# Patient Record
Sex: FEMALE | Race: WHITE | HISPANIC OR LATINO | Employment: FULL TIME | URBAN - METROPOLITAN AREA
[De-identification: names, ages, dates, MRNs, and addresses within clinical notes are randomized per-mention and may not be internally consistent; named-entity substitution may affect disease eponyms.]

---

## 2017-01-03 ENCOUNTER — TRANSFERRED RECORDS (OUTPATIENT)
Dept: HEALTH INFORMATION MANAGEMENT | Facility: CLINIC | Age: 22
End: 2017-01-03

## 2017-01-09 ENCOUNTER — TELEPHONE (OUTPATIENT)
Dept: PEDIATRICS | Facility: CLINIC | Age: 22
End: 2017-01-09

## 2017-01-09 ENCOUNTER — OFFICE VISIT (OUTPATIENT)
Dept: PEDIATRICS | Facility: CLINIC | Age: 22
End: 2017-01-09
Payer: COMMERCIAL

## 2017-01-09 VITALS
TEMPERATURE: 98.1 F | HEIGHT: 64 IN | WEIGHT: 139.4 LBS | SYSTOLIC BLOOD PRESSURE: 108 MMHG | HEART RATE: 72 BPM | BODY MASS INDEX: 23.8 KG/M2 | DIASTOLIC BLOOD PRESSURE: 68 MMHG

## 2017-01-09 DIAGNOSIS — F90.0 ADHD (ATTENTION DEFICIT HYPERACTIVITY DISORDER), INATTENTIVE TYPE: Primary | ICD-10-CM

## 2017-01-09 DIAGNOSIS — F90.0 ADHD (ATTENTION DEFICIT HYPERACTIVITY DISORDER), INATTENTIVE TYPE: ICD-10-CM

## 2017-01-09 DIAGNOSIS — F32.81 PMDD (PREMENSTRUAL DYSPHORIC DISORDER): Primary | ICD-10-CM

## 2017-01-09 PROCEDURE — 99213 OFFICE O/P EST LOW 20 MIN: CPT | Performed by: INTERNAL MEDICINE

## 2017-01-09 RX ORDER — DEXTROAMPHETAMINE SACCHARATE, AMPHETAMINE ASPARTATE MONOHYDRATE, DEXTROAMPHETAMINE SULFATE AND AMPHETAMINE SULFATE 1.25; 1.25; 1.25; 1.25 MG/1; MG/1; MG/1; MG/1
5-10 CAPSULE, EXTENDED RELEASE ORAL DAILY
Qty: 60 CAPSULE | Refills: 0 | Status: SHIPPED | OUTPATIENT
Start: 2017-01-09 | End: 2017-01-18

## 2017-01-09 RX ORDER — PANTOPRAZOLE SODIUM 40 MG/1
1 TABLET, DELAYED RELEASE ORAL DAILY
Refills: 4 | COMMUNITY
Start: 2016-11-25 | End: 2017-03-22

## 2017-01-09 RX ORDER — DROSPIRENONE AND ETHINYL ESTRADIOL 0.03MG-3MG
1 KIT ORAL DAILY
Qty: 84 TABLET | Refills: 3 | Status: SHIPPED | OUTPATIENT
Start: 2017-01-09 | End: 2017-11-22

## 2017-01-09 RX ORDER — DEXTROAMPHETAMINE SACCHARATE, AMPHETAMINE ASPARTATE MONOHYDRATE, DEXTROAMPHETAMINE SULFATE AND AMPHETAMINE SULFATE 2.5; 2.5; 2.5; 2.5 MG/1; MG/1; MG/1; MG/1
10 CAPSULE, EXTENDED RELEASE ORAL DAILY
Qty: 30 CAPSULE | Refills: 0 | Status: SHIPPED | OUTPATIENT
Start: 2017-01-09 | End: 2017-01-18

## 2017-01-09 RX ORDER — METHYLPHENIDATE HYDROCHLORIDE 18 MG/1
18 TABLET ORAL EVERY MORNING
Refills: 0 | Status: CANCELLED | OUTPATIENT
Start: 2017-01-09

## 2017-01-09 NOTE — TELEPHONE ENCOUNTER
Patient had an office appointment today and is beginning Adderall XR.  Was told to take 5 mg daily for one day, and then increase to 10 mg daily.  Patient's insurance won't cover more than one capsule daily.  Need to dose either 5 mg or 10 mg.  Please call patient with direction.  May need a new RX.  EBONIE Lu RN

## 2017-01-09 NOTE — MR AVS SNAPSHOT
After Visit Summary   1/9/2017    Mariposa Nunez    MRN: [id number]           Patient Information     Date Of Birth          1995        Visit Information        Provider Department      1/9/2017 10:50 AM Nadine Villarreal MD [facility]        Today's Diagnoses     PMDD (premenstrual dysphoric disorder)    -  1     ADHD (attention deficit hyperactivity disorder), inattentive type           Care Instructions    We should try to get a copy of the ADHD testing you had done in 11th grade.  Let's work on finding an ADHD medication that has very few side effects. trythe adderall XR 5 mg the first day, then 10 mg daily.        Follow-ups after your visit        Who to contact     If you have questions or need follow up information about today's clinic visit or your schedule please contact [facility] directly at [phone].  Normal or non-critical lab and imaging results will be communicated to you by MyChart, letter or phone within 4 business days after the clinic has received the results. If you do not hear from us within 7 days, please contact the clinic through [facility] or phone. If you have a critical or abnormal lab result, we will notify you by phone as soon as possible.  Submit refill requests through [employer] or call your pharmacy and they will forward the refill request to us. Please allow 3 business days for your refill to be completed.          Additional Information About Your Visit        MyChart Information     [facility] gives you secure access to your electronic health record. If you see a primary care provider, you can also send messages to your care team and make appointments. If you have questions, please call your primary care clinic.  If you do not have a primary care provider, please call [phone] and they will assist you.        Care EveryWhere ID     This is your Care EveryWhere ID. This could be used by other organizations to access your [place]  "medical records  [id number]        Your Vitals Were     Pulse Temperature Height BMI (Body Mass Index)          72 98.1  F (36.7  C) (Oral) 5' 4\" (1.626 m) 23.92 kg/m2         Blood Pressure from Last 3 Encounters:   01/09/17 108/68   07/26/16 110/76   01/04/16 116/78    Weight from Last 3 Encounters:   01/09/17 139 lb 6.4 oz (63.231 kg) (68.18 %*)   07/26/16 137 lb 1.6 oz (62.188 kg) (66.36 %*)   01/04/16 144 lb 3.2 oz (65.409 kg) (77.07 %*)     * Growth percentiles are based on [place] 2-20 Years data.              Today, you had the following     No orders found for display         Today's Medication Changes          These changes are accurate as of: 1/9/17 11:41 AM.  If you have any questions, ask your nurse or doctor.               Start taking these medicines.        Dose/Directions    amphetamine-dextroamphetamine 5 MG per 24 hr capsule   Commonly known as:  ADDERALL XR   Used for:  ADHD (attention deficit hyperactivity disorder), inattentive type   Started by:  Nadine Villarreal MD        Dose:  5-10 mg   Take 1-2 capsules (5-10 mg) by mouth daily   Quantity:  60 capsule   Refills:  0            Where to get your medicines      These medications were sent to [facility] [id number] [address] AT SEC of [facility] [address]  [address] [facility] [address], [facility] [address]     Phone:  [phone]    - drospirenone-ethinyl estradiol 3-0.03 MG per tablet      Some of these will need a paper prescription and others can be bought over the counter.  Ask your nurse if you have questions.     Bring a paper prescription for each of these medications    - amphetamine-dextroamphetamine 5 MG per 24 hr capsule             Primary Care Provider Office Phone # Fax #    Danielle Al Goodwin [phone]       [facility] [address] [facility] [address]        Thank you!     Thank you for choosing [facility]  for your care. Our goal is always to provide you " with excellent care. Hearing back from our patients is one way we can continue to improve our services. Please take a few minutes to complete the written survey that you may receive in the mail after your visit with us. Thank you!             Your Updated Medication List - Protect others around you: Learn how to safely use, store and throw away your medicines at www.disposemymeds.org.          This list is accurate as of: 1/9/17 11:41 AM.  Always use your most recent med list.                   Brand Name Dispense Instructions for use    amphetamine-dextroamphetamine 5 MG per 24 hr capsule    ADDERALL XR    60 capsule    Take 1-2 capsules (5-10 mg) by mouth daily       drospirenone-ethinyl estradiol 3-0.03 MG per tablet    HAKAN    84 tablet    Take 1 tablet by mouth daily       * EPIPEN IJ          * EPINEPHrine 0.3 MG/0.3ML injection     0.6 mL    Inject 0.3 mLs (0.3 mg) into the muscle as needed for anaphylaxis       METHYLPHENIDATE HCL      2.5 mg daily       pantoprazole 40 MG EC tablet    PROTONIX     Take 1 tablet by mouth daily       * Notice:  This list has 2 medication(s) that are the same as other medications prescribed for you. Read the directions carefully, and ask your doctor or other care provider to review them with you.

## 2017-01-09 NOTE — PATIENT INSTRUCTIONS
We should try to get a copy of the ADHD testing you had done in 11th grade.  Let's work on finding an ADHD medication that has very few side effects. trythe adderall XR 5 mg the first day, then 10 mg daily.

## 2017-01-09 NOTE — NURSING NOTE
"Chief Complaint   Patient presents with     Recheck Medication       Initial /68 mmHg  Pulse 72  Temp(Src) 98.1  F (36.7  C) (Oral)  Ht 5' 4\" (1.626 m)  Wt 139 lb 6.4 oz (63.231 kg)  BMI 23.92 kg/m2 Estimated body mass index is 23.92 kg/(m^2) as calculated from the following:    Height as of this encounter: 5' 4\" (1.626 m).    Weight as of this encounter: 139 lb 6.4 oz (63.231 kg).  BP completed using cuff size: talha Rodríguez LPN    "

## 2017-01-09 NOTE — PROGRESS NOTES
SUBJECTIVE:                                                    Mariposa Nunez is a 21 year old female who presents to clinic today for the following health issues:      Medication Followup    Taking Medication as prescribed: yes    Side Effects:  None    Medication Helping Symptoms:  yes     Doing well with hollis. Would like a refill.    ADHD Follow-Up    Date of last ADHD office visit: previous pediatrician prescribing concerta. She feels it is working. She does note that she feels like her concentration is too intense. She doesn't like to take it if she doesn't have class because it makes her uncomfortable. Feels it is difficult to relax with her friends. Has always felt this was normal on the medication.    Status since last visit: Stable  Taking controlled (daily) medications as prescribed: Yes                                                                           ADHD Medication     Amphetamines Disp Start End    amphetamine-dextroamphetamine (ADDERALL XR) 10 MG per 24 hr capsule 30 capsule 2/9/2017     Sig - Route: Take 1 capsule (10 mg) by mouth daily - Oral    Class: Local [employer]          Sleep: no problems  Home/Family Concerns: None  Peer Concerns: None    Co-Morbid Diagnosis: None    Currently in counseling: No        Medication Benefits:   Controlled symptoms: Distractability and Finishing tasks  Uncontrolled symptoms: None    Medication side effects:  Parent/Patient Concerns with Medications: see above  Denies: insomnia, palpitations, stomach ache, headache and emotional lability      Problem list and histories reviewed & adjusted, as indicated.  Additional history: as documented    Problem list, Medication list, Allergies, and Medical/Social/Surgical histories reviewed in [facility] and updated as appropriate.    ROS:  Constitutional, HEENT, cardiovascular, pulmonary, gi and gu systems are negative, except as otherwise noted.    OBJECTIVE:                                                    /68 mmHg  " Pulse 72  Temp(Src) 98.1  F (36.7  C) (Oral)  Ht 5' 4\" (1.626 m)  Wt 139 lb 6.4 oz (63.231 kg)  BMI 23.92 kg/m2  Body mass index is 23.92 kg/(m^2).  GENERAL: healthy, alert and no distress  NECK: no adenopathy, no asymmetry, masses, or scars and thyroid normal to palpation  RESP: lungs clear to auscultation - no rales, rhonchi or wheezes  CV: regular rate and rhythm, normal S1 S2, no S3 or S4, no murmur, click or rub, no peripheral edema     Diagnostic Test Results:  none      ASSESSMENT/PLAN:                                                      1. PMDD (premenstrual dysphoric disorder)    - drospirenone-ethinyl estradiol (HAKAN) 3-0.03 MG per tablet; Take 1 tablet by mouth daily  Dispense: 84 tablet; Refill: 3    2. ADHD (attention deficit hyperactivity disorder), inattentive type  Plan trial on adderall XR instead. Follow up in 2 weeks before she goes back to college.      Patient Instructions   We should try to get a copy of the ADHD testing you had done in 11th grade.  Let's work on finding an ADHD medication that has very few side effects. trythe adderall XR 5 mg the first day, then 10 mg daily.        Nadine Ayon MD  [facility]  "

## 2017-01-18 ENCOUNTER — OFFICE VISIT (OUTPATIENT)
Dept: PEDIATRICS | Facility: CLINIC | Age: 22
End: 2017-01-18
Payer: COMMERCIAL

## 2017-01-18 VITALS
WEIGHT: 138 LBS | DIASTOLIC BLOOD PRESSURE: 70 MMHG | TEMPERATURE: 98.4 F | BODY MASS INDEX: 23.56 KG/M2 | HEART RATE: 72 BPM | SYSTOLIC BLOOD PRESSURE: 108 MMHG | HEIGHT: 64 IN

## 2017-01-18 DIAGNOSIS — F90.0 ADHD (ATTENTION DEFICIT HYPERACTIVITY DISORDER), INATTENTIVE TYPE: Primary | ICD-10-CM

## 2017-01-18 PROCEDURE — 99213 OFFICE O/P EST LOW 20 MIN: CPT | Performed by: INTERNAL MEDICINE

## 2017-01-18 RX ORDER — DEXTROAMPHETAMINE SACCHARATE, AMPHETAMINE ASPARTATE MONOHYDRATE, DEXTROAMPHETAMINE SULFATE AND AMPHETAMINE SULFATE 2.5; 2.5; 2.5; 2.5 MG/1; MG/1; MG/1; MG/1
10 CAPSULE, EXTENDED RELEASE ORAL DAILY
Qty: 30 CAPSULE | Refills: 0 | Status: SHIPPED | OUTPATIENT
Start: 2017-02-09 | End: 2017-05-15

## 2017-01-18 NOTE — PROGRESS NOTES
"SUBJECTIVE:                                                    Mariposa Nunez is a 21 year old female who presents to clinic today with  because of:    Chief Complaint   Patient presents with     Recheck Medication        HPI:  ADHD Follow-Up    Date of last ADHD office visit: 1/9/17  Status since last visit: Improving  Taking controlled (daily) medications as prescribed: Yes                                                                           ADHD Medication     Amphetamines Disp Start End    amphetamine-dextroamphetamine (ADDERALL XR) 10 MG per 24 hr capsule 30 capsule 1/9/2017     Sig - Route: Take 1 capsule (10 mg) by mouth daily - Oral    Class: Local [employer]          School:  Name of SCHOOL: [employer]  Grade: sophomore in college     Sleep: no problems  Home/Family Concerns: None  Peer Concerns: None    Co-Morbid Diagnosis: None    Currently in counseling: No    Medication Benefits:   Controlled symptoms: Attention span, Distractability and Finishing tasks  Uncontrolled symptoms: None    Medication side effects:  Parent/Patient Concerns with Medications: None  Denies: appetite suppression, weight loss, insomnia, tics, palpitations, stomach ache, headache, emotional lability, rebound irritability, drowsiness, \"zombie\" effect, growth suppression and dry mouth    ROS:  Negative for constitutional, eye, ear, nose, throat, skin, respiratory, cardiac, and gastrointestinal other than those outlined in the HPI.    PROBLEM LIST:  Patient Active Problem List    Diagnosis Date Noted     ADHD (attention deficit hyperactivity disorder), inattentive type 01/09/2017     Priority: Medium     History of bee sting allergy 07/26/2016     Priority: Medium     Anxiety state 09/02/2006     Priority: Medium     Had short term counselling  2 years ago.  Resolved.  Problem list name updated by automated process. Provider to review        MEDICATIONS:  Current Outpatient Prescriptions   Medication Sig Dispense Refill     " "pantoprazole (PROTONIX) 40 MG EC tablet Take 1 tablet by mouth daily  4     drospirenone-ethinyl estradiol (HAKAN) 3-0.03 MG per tablet Take 1 tablet by mouth daily 84 tablet 3     amphetamine-dextroamphetamine (ADDERALL XR) 10 MG per 24 hr capsule Take 1 capsule (10 mg) by mouth daily 30 capsule 0     EPINEPHrine (EPIPEN) 0.3 MG/0.3ML injection Inject 0.3 mLs (0.3 mg) into the muscle as needed for anaphylaxis 0.6 mL 11     EPINEPHrine (EPIPEN IJ)         ALLERGIES:  Allergies   Allergen Reactions     Bees      Severe swelling at sting site     Hpv Vaccine Recombinant (Yeast Derived) [Quadrivalent Hpv, 6,11,16,18]      Sores in mouth and vagina       Problem list and histories reviewed & adjusted, as indicated.    OBJECTIVE:                                                      /70 mmHg  Pulse 72  Temp(Src) 98.4  F (36.9  C)  Ht 5' 4\" (1.626 m)  Wt 138 lb (62.596 kg)  BMI 23.68 kg/m2   Blood pressure percentiles are 49% systolic and 75% diastolic based on 2000 NHANES data. Blood pressure percentile targets: 90: 122/77, 95: 125/80, 99 + 5 mmH/93.    GENERAL: Active, alert, in no acute distress.  NECK: Supple, no masses.  LYMPH NODES: No adenopathy  LUNGS: Clear. No rales, rhonchi, wheezing or retractions  HEART: Regular rhythm. Normal S1/S2. No murmurs.  ABDOMEN: Soft, non-tender, not distended, no masses or hepatosplenomegaly. Bowel sounds normal.   NEURO: Normal strength and tone, sensory exam grossly normal, mentation intact and speech normal.       DIAGNOSTICS: None    ASSESSMENT/PLAN:                                                    1. ADHD (attention deficit hyperactivity disorder), inattentive type  Improved on Adderall XR. No longer feels her mood is too intense. Happy with this new medication. Will continue.  - amphetamine-dextroamphetamine (ADDERALL XR) 10 MG per 24 hr capsule; Take 1 capsule (10 mg) by mouth daily  Dispense: 30 capsule; Refill: 0      FOLLOW UP:   Patient Instructions "   Let's meet when you are home over spring break.  Have a wonderful winter/spring semester.        Nadine Ayon MD

## 2017-01-18 NOTE — NURSING NOTE
"Chief Complaint   Patient presents with     Recheck Medication       Initial /70 mmHg  Pulse 72  Temp(Src) 98.4  F (36.9  C)  Ht 5' 4\" (1.626 m)  Wt 138 lb (62.596 kg)  BMI 23.68 kg/m2 Estimated body mass index is 23.68 kg/(m^2) as calculated from the following:    Height as of this encounter: 5' 4\" (1.626 m).    Weight as of this encounter: 138 lb (62.596 kg).  BP completed using cuff size: talha Rodríguez LPN    "

## 2017-01-18 NOTE — MR AVS SNAPSHOT
"              After Visit Summary   1/18/2017    Mariposa Nunez    MRN: [id number]           Patient Information     Date Of Birth          1995        Visit Information        Provider Department      1/18/2017 10:30 AM Nadine Villarreal MD [facility]        Today's Diagnoses     ADHD (attention deficit hyperactivity disorder), inattentive type    -  1       Care Instructions    Let's meet when you are home over spring break.  Have a wonderful winter/spring semester.        Follow-ups after your visit        Who to contact     If you have questions or need follow up information about today's clinic visit or your schedule please contact [facility] directly at [phone].  Normal or non-critical lab and imaging results will be communicated to you by MyChart, letter or phone within 4 business days after the clinic has received the results. If you do not hear from us within 7 days, please contact the clinic through [employer] or phone. If you have a critical or abnormal lab result, we will notify you by phone as soon as possible.  Submit refill requests through [employer] or call your pharmacy and they will forward the refill request to us. Please allow 3 business days for your refill to be completed.          Additional Information About Your Visit        MyChart Information     [employer] gives you secure access to your electronic health record. If you see a primary care provider, you can also send messages to your care team and make appointments. If you have questions, please call your primary care clinic.  If you do not have a primary care provider, please call [phone] and they will assist you.        Care EveryWhere ID     This is your Care EveryWhere ID. This could be used by other organizations to access your [place] medical records  [id number]        Your Vitals Were     Pulse Temperature Height BMI (Body Mass Index)          72 98.4  F (36.9  C) 5' 4\" (1.626 m) 23.68 kg/m2      "    Blood Pressure from Last 3 Encounters:   01/18/17 108/70   01/09/17 108/68   07/26/16 110/76    Weight from Last 3 Encounters:   01/18/17 138 lb (62.596 kg) (66.18 %*)   01/09/17 139 lb 6.4 oz (63.231 kg) (68.18 %*)   07/26/16 137 lb 1.6 oz (62.188 kg) (66.36 %*)     * Growth percentiles are based on [facility] 2-20 Years data.              Today, you had the following     No orders found for display         Where to get your medicines      Some of these will need a paper prescription and others can be bought over the counter.  Ask your nurse if you have questions.     Bring a paper prescription for each of these medications    - amphetamine-dextroamphetamine 10 MG per 24 hr capsule       Primary Care Provider Office Phone # Fax #    Danielle Goodwin [phone]       [facility] [address] [facility] [address]        Thank you!     Thank you for choosing [facility]  for your care. Our goal is always to provide you with excellent care. Hearing back from our patients is one way we can continue to improve our services. Please take a few minutes to complete the written survey that you may receive in the mail after your visit with us. Thank you!             Your Updated Medication List - Protect others around you: Learn how to safely use, store and throw away your medicines at www.disposemymeds.org.          This list is accurate as of: 1/18/17 11:02 AM.  Always use your most recent med list.                   Brand Name Dispense Instructions for use    amphetamine-dextroamphetamine 10 MG per 24 hr capsule   Start taking on:  2/9/2017    ADDERALL XR    30 capsule    Take 1 capsule (10 mg) by mouth daily       drospirenone-ethinyl estradiol 3-0.03 MG per tablet    HAKAN    84 tablet    Take 1 tablet by mouth daily       * EPIPEN IJ          * EPINEPHrine 0.3 MG/0.3ML injection     0.6 mL    Inject 0.3 mLs (0.3 mg) into the muscle as needed for anaphylaxis       pantoprazole 40 MG  EC tablet    PROTONIX     Take 1 tablet by mouth daily       * Notice:  This list has 2 medication(s) that are the same as other medications prescribed for you. Read the directions carefully, and ask your doctor or other care provider to review them with you.

## 2017-03-22 ENCOUNTER — OFFICE VISIT (OUTPATIENT)
Dept: PEDIATRICS | Facility: CLINIC | Age: 22
End: 2017-03-22
Payer: COMMERCIAL

## 2017-03-22 VITALS
HEART RATE: 92 BPM | HEIGHT: 64 IN | WEIGHT: 139.4 LBS | SYSTOLIC BLOOD PRESSURE: 120 MMHG | DIASTOLIC BLOOD PRESSURE: 62 MMHG | OXYGEN SATURATION: 98 % | TEMPERATURE: 98.1 F | BODY MASS INDEX: 23.8 KG/M2

## 2017-03-22 DIAGNOSIS — F90.0 ADHD (ATTENTION DEFICIT HYPERACTIVITY DISORDER), INATTENTIVE TYPE: ICD-10-CM

## 2017-03-22 DIAGNOSIS — K21.9 GASTROESOPHAGEAL REFLUX DISEASE, ESOPHAGITIS PRESENCE NOT SPECIFIED: Primary | ICD-10-CM

## 2017-03-22 PROCEDURE — 99213 OFFICE O/P EST LOW 20 MIN: CPT | Mod: GE | Performed by: INTERNAL MEDICINE

## 2017-03-22 RX ORDER — PANTOPRAZOLE SODIUM 40 MG/1
40 TABLET, DELAYED RELEASE ORAL DAILY
Qty: 30 TABLET | Refills: 4 | Status: SHIPPED | OUTPATIENT
Start: 2017-03-22 | End: 2018-01-23

## 2017-03-22 RX ORDER — DEXTROAMPHETAMINE SACCHARATE, AMPHETAMINE ASPARTATE MONOHYDRATE, DEXTROAMPHETAMINE SULFATE AND AMPHETAMINE SULFATE 2.5; 2.5; 2.5; 2.5 MG/1; MG/1; MG/1; MG/1
10 CAPSULE, EXTENDED RELEASE ORAL DAILY
Qty: 30 CAPSULE | Refills: 0 | Status: SHIPPED | OUTPATIENT
Start: 2017-04-22 | End: 2017-05-15

## 2017-03-22 RX ORDER — DEXTROAMPHETAMINE SACCHARATE, AMPHETAMINE ASPARTATE MONOHYDRATE, DEXTROAMPHETAMINE SULFATE AND AMPHETAMINE SULFATE 2.5; 2.5; 2.5; 2.5 MG/1; MG/1; MG/1; MG/1
10 CAPSULE, EXTENDED RELEASE ORAL DAILY
Qty: 30 CAPSULE | Refills: 0 | Status: SHIPPED | OUTPATIENT
Start: 2017-03-22 | End: 2017-04-21

## 2017-03-22 RX ORDER — PANTOPRAZOLE SODIUM 40 MG/1
40 TABLET, DELAYED RELEASE ORAL DAILY
Qty: 30 TABLET | Refills: 4 | Status: CANCELLED | OUTPATIENT
Start: 2017-03-22

## 2017-03-22 RX ORDER — DEXTROAMPHETAMINE SACCHARATE, AMPHETAMINE ASPARTATE MONOHYDRATE, DEXTROAMPHETAMINE SULFATE AND AMPHETAMINE SULFATE 2.5; 2.5; 2.5; 2.5 MG/1; MG/1; MG/1; MG/1
10 CAPSULE, EXTENDED RELEASE ORAL DAILY
Qty: 30 CAPSULE | Refills: 0 | Status: SHIPPED | OUTPATIENT
Start: 2017-05-23 | End: 2017-05-15

## 2017-03-22 RX ORDER — DEXTROAMPHETAMINE SACCHARATE, AMPHETAMINE ASPARTATE MONOHYDRATE, DEXTROAMPHETAMINE SULFATE AND AMPHETAMINE SULFATE 2.5; 2.5; 2.5; 2.5 MG/1; MG/1; MG/1; MG/1
10 CAPSULE, EXTENDED RELEASE ORAL DAILY
Qty: 30 CAPSULE | Refills: 0 | Status: CANCELLED | OUTPATIENT
Start: 2017-03-22

## 2017-03-22 NOTE — MR AVS SNAPSHOT
After Visit Summary   3/22/2017    Mariposa Nunez    MRN: [id number]           Patient Information     Date Of Birth          1995        Visit Information        Provider Department      3/22/2017 2:15 PM Markus White MD [facility]        Today's Diagnoses     ADHD (attention deficit hyperactivity disorder), inattentive type          Care Instructions      - your medication was refilled for the next 3 months  - return to clinic in 3 months to discuss your symptoms and to receive a refill  - call the clinic if you have any concerns about your current medicine (effectiveness, side effects)  - call with questions  Attention Deficit/Hyperactivity Disorder (ADHD, ADD) in Adults  You ve always had trouble concentrating. Your mind wanders, and it s hard to finish tasks. As a result, you didn t do well in school. And now, you often struggle with your job. Sometimes this makes you mitchell or depressed. These may be symptoms of attention deficit  hyperactivity disorder (ADHD) or may still be referred to as attention deficit disorder (ADD). To find out more, talk to your healthcare provider. He or she can offer guidance and support.  Symptoms  of ADD in adults  For an adult to be diagnosed with ADHD, the symptoms must have been present since childhood. The symptoms may include:    Trouble thinking things through    Low self-esteem    Depression    Trouble holding a job    Memory problems    Problems with a marriage or relationship    Lack of discipline   What is ADHD?  Attention deficit hyperactivity disorder makes it hard to focus your mind. You may daydream a lot. And you may be restless much of the time. As a result, you may have trouble with detailed or boring work. And it may be hard to stick with one project for very long. You also may forget things. Or, you may miss key points during a lecture or meeting. You may even have trouble sitting through a movie or concert. At times, you may  feel frustrated or angry. This can affect your relationships with others.  Who does it affect?  ADHD begins in childhood. Sometimes, your symptoms may improve as you get older. But they also may persist into your adult years. ADHD is often thought of as a  kid s problem.  That s why it s often missed in adults. In fact, many parents learn they have ADHD when their children are diagnosed.  What causes it?  The exact cause of ADHD isn t known. The disorder does run in families. Having one parent with ADHD makes it more likely you ll have it too. And the part of your brain that controls attention may be involved. Certain brain chemicals that are out of balance may also play a role.  What can be done?  The first step is finding out if you really have ADHD. Your doctor will use special guidelines to diagnose the disorder. Most adults with ADHD are greatly helped by therapy and coaching. In some cases, your doctor may also prescribe medicine to ease your symptoms.  Resources    National Resource Center on AD/HDwww.egeo0zbem.org    Attention Deficit Disorder Associationwww.add.org     4609-0052 [employer]. [address] [place] [address]. All rights reserved. This information is not intended as a substitute for professional medical care. Always follow your healthcare professional's instructions.        Patient Education    Amphetamine Aspartate, Amphetamine Sulfate, Dextroamphetamine Saccharate, Dextroamphetamine Sulfate Oral capsule, extended-release    Amphetamine Aspartate, Amphetamine Sulfate, Dextroamphetamine Saccharate, Dextroamphetamine Sulfate Oral tablet  Amphetamine Aspartate, Amphetamine Sulfate, Dextroamphetamine Saccharate, Dextroamphetamine Sulfate Oral tablet  What is this medicine?  AMPHETAMINE; DEXTROAMPHETAMINE(am FET a meen; dex troe am FET a meen) is used to treat attention-deficit hyperactivity disorder (ADHD). It may also be used for narcolepsy. Federal law prohibits giving  this medicine to any person other than the person for whom it was prescribed. Do not share this medicine with anyone else.  This medicine may be used for other purposes; ask your health care provider or pharmacist if you have questions.  What should I tell my health care provider before I take this medicine?  They need to know if you have any of these conditions:    anxiety or panic attacks    circulation problems in fingers and toes    glaucoma    hardening or blockages of the arteries or heart blood vessels    heart disease or a heart defect    high blood pressure    history of a drug or alcohol abuse problem    history of stroke    kidney disease    liver disease    mental illness    seizures    suicidal thoughts, plans, or attempt; a previous suicide attempt by you or a family member    thyroid disease    Tourette's syndrome    an unusual or allergic reaction to dextroamphetamine, other amphetamines, other medicines, foods, dyes, or preservatives    pregnant or trying to get pregnant    breast-feeding  How should I use this medicine?  Take this medicine by mouth with a glass of water. Follow the directions on the prescription label. Take your doses at regular intervals. Do not take your medicine more often than directed. Do not suddenly stop your medicine. You must gradually reduce the dose or you may feel withdrawal effects. Ask your doctor or health care professional for advice.  Talk to your pediatrician regarding the use of this medicine in children. Special care may be needed. While this drug may be prescribed for children as young as 3 years for selected conditions, precautions do apply.  Overdosage: If you think you have taken too much of this medicine contact a poison control center or emergency room at once.  NOTE: This medicine is only for you. Do not share this medicine with others.  What if I miss a dose?  If you miss a dose, take it as soon as you can. If it is almost time for your next dose, take  only that dose. Do not take double or extra doses.  What may interact with this medicine?  Do not take this medicine with any of the following medications:    MAOIS like Carbex, Eldepryl, Marplan, Nardil, and Parnate    other stimulant medicines for attention disorders, weight loss, or to stay awake  This medicine may also interact with the following medications:    acetazolamide    ammonium chloride    antacids    ascorbic acid    atomoxetine    caffeine    certain medicines for blood pressure    certain medicines for depression, anxiety, or psychotic disturbances    certain medicines for seizures like carbamazepine, phenobarbital, phenytoin    certain medicines for stomach problems like cimetidine, famotidine, omeprazole, lansoprazole    cold or allergy medicines    glutamic acid    lithium    meperidine    methenamine; sodium acid phosphate    narcotic medicines for pain    norepinephrine    phenothiazines like chlorpromazine, mesoridazine, prochlorperazine, thioridazine    sodium acid phosphate    sodium bicarbonate  This list may not describe all possible interactions. Give your health care provider a list of all the medicines, herbs, non-prescription drugs, or dietary supplements you use. Also tell them if you smoke, drink alcohol, or use illegal drugs. Some items may interact with your medicine.  What should I watch for while using this medicine?  Visit your doctor or health care professional for regular checks on your progress. This prescription requires that you follow special procedures with your doctor and pharmacy. You will need to have a new written prescription from your doctor every time you need a refill.  This medicine may affect your concentration, or hide signs of tiredness. Until you know how this medicine affects you, do not drive, ride a bicycle, use machinery, or do anything that needs mental alertness.  Tell your doctor or health care professional if this medicine loses its effects, or if you  feel you need to take more than the prescribed amount. Do not change the dosage without talking to your doctor or health care professional.  Decreased appetite is a common side effect when starting this medicine. Eating small, frequent meals or snacks can help. Talk to your doctor if you continue to have poor eating habits. Height and weight growth of a child taking this medicine will be monitored closely.  Do not take this medicine close to bedtime. It may prevent you from sleeping.  If you are going to need surgery, a MRI, CT scan, or other procedure, tell your doctor that you are taking this medicine. You may need to stop taking this medicine before the procedure.  Tell your doctor or healthcare professional right away if you notice unexplained wounds on your fingers and toes while taking this medicine. You should also tell your healthcare provider if you experience numbness or pain, changes in the skin color, or sensitivity to temperature in your fingers or toes.  What side effects may I notice from receiving this medicine?  Side effects that you should report to your doctor or health care professional as soon as possible:    allergic reactions like skin rash, itching or hives, swelling of the face, lips, or tongue    changes in vision    chest pain or chest tightness    confusion, trouble speaking or understanding    fast, irregular heartbeat    fingers or toes feel numb, cool, painful    hallucination, loss of contact with reality    high blood pressure    males: prolonged or painful erection    seizures    severe headaches    shortness of breath    suicidal thoughts or other mood changes    trouble walking, dizziness, loss of balance or coordination    uncontrollable head, mouth, neck, arm, or leg movements  Side effects that usually do not require medical attention (report to your doctor or health care professional if they continue or are bothersome):    anxious    headache    loss of appetite    nausea,  vomiting    trouble sleeping    weight loss  This list may not describe all possible side effects. Call your doctor for medical advice about side effects. You may report side effects to FDA at 7-072-IRN-1787.  Where should I keep my medicine?  Keep out of the reach of children. This medicine can be abused. Keep your medicine in a safe place to protect it from theft. Do not share this medicine with anyone. Selling or giving away this medicine is dangerous and against the law.  Store at room temperature between 15 and 30 degrees C (59 and 86 degrees F). Keep container tightly closed. Throw away any unused medicine after the expiration date. Dispose of properly. This medicine may cause accidental overdose and death if it is taken by other adults, children, or pets. Mix any unused medicine with a substance like cat litter or coffee grounds. Then throw the medicine away in a sealed container like a sealed bag or a coffee can with a lid. Do not use the medicine after the expiration date.  NOTE: This sheet is a summary. It may not cover all possible information. If you have questions about this medicine, talk to your doctor, pharmacist, or health care provider.  NOTE:This sheet is a summary. It may not cover all possible information. If you have questions about this medicine, talk to your doctor, pharmacist, or health care provider. Copyright  2016 [employer]              Follow-ups after your visit        Who to contact     If you have questions or need follow up information about today's clinic visit or your schedule please contact [facility] directly at [phone].  Normal or non-critical lab and imaging results will be communicated to you by MyChart, letter or phone within 4 business days after the clinic has received the results. If you do not hear from us within 7 days, please contact the clinic through MyChart or phone. If you have a critical or abnormal lab result, we will notify you by phone as  "soon as possible.  Submit refill requests through [employer] or call your pharmacy and they will forward the refill request to us. Please allow 3 business days for your refill to be completed.          Additional Information About Your Visit        [facility] Information     [employer] gives you secure access to your electronic health record. If you see a primary care provider, you can also send messages to your care team and make appointments. If you have questions, please call your primary care clinic.  If you do not have a primary care provider, please call [phone] and they will assist you.        Care EveryWhere ID     This is your Care EveryWhere ID. This could be used by other organizations to access your [place] medical records  [id number]        Your Vitals Were     Pulse Temperature Height Pulse Oximetry BMI (Body Mass Index)       92 98.1  F (36.7  C) (Oral) 5' 4\" (1.626 m) 98% 23.93 kg/m2        Blood Pressure from Last 3 Encounters:   03/22/17 120/62   01/18/17 108/70   01/09/17 108/68    Weight from Last 3 Encounters:   03/22/17 139 lb 6.4 oz (63.2 kg)   01/18/17 138 lb (62.6 kg) (66 %)*   01/09/17 139 lb 6.4 oz (63.2 kg) (68 %)*     * Growth percentiles are based on [facility] 2-20 Years data.              Today, you had the following     No orders found for display         Today's Medication Changes          These changes are accurate as of: 3/22/17  2:50 PM.  If you have any questions, ask your nurse or doctor.               These medicines have changed or have updated prescriptions.        Dose/Directions    * amphetamine-dextroamphetamine 10 MG per 24 hr capsule   Commonly known as:  ADDERALL XR   This may have changed:  Another medication with the same name was added. Make sure you understand how and when to take each.   Used for:  ADHD (attention deficit hyperactivity disorder), inattentive type   Changed by:  Nadine Villarreal MD        Dose:  10 mg   Take 1 capsule (10 mg) by mouth daily   Quantity:  " 30 capsule   Refills:  0       * amphetamine-dextroamphetamine 10 MG per 24 hr capsule   Commonly known as:  ADDERALL XR   This may have changed:  You were already taking a medication with the same name, and this prescription was added. Make sure you understand how and when to take each.   Used for:  ADHD (attention deficit hyperactivity disorder), inattentive type   Changed by:  Markus White MD        Dose:  10 mg   Take 1 capsule (10 mg) by mouth daily   Quantity:  30 capsule   Refills:  0       * amphetamine-dextroamphetamine 10 MG per 24 hr capsule   Commonly known as:  ADDERALL XR   This may have changed:  You were already taking a medication with the same name, and this prescription was added. Make sure you understand how and when to take each.   Used for:  ADHD (attention deficit hyperactivity disorder), inattentive type   Changed by:  Markus White MD        Dose:  10 mg   Start taking on:  4/22/2017   Take 1 capsule (10 mg) by mouth daily   Quantity:  30 capsule   Refills:  0       * amphetamine-dextroamphetamine 10 MG per 24 hr capsule   Commonly known as:  ADDERALL XR   This may have changed:  You were already taking a medication with the same name, and this prescription was added. Make sure you understand how and when to take each.   Used for:  ADHD (attention deficit hyperactivity disorder), inattentive type   Changed by:  Markus White MD        Dose:  10 mg   Start taking on:  5/23/2017   Take 1 capsule (10 mg) by mouth daily   Quantity:  30 capsule   Refills:  0       * Notice:  This list has 4 medication(s) that are the same as other medications prescribed for you. Read the directions carefully, and ask your doctor or other care provider to review them with you.         Where to get your medicines      Some of these will need a paper prescription and others can be bought over the counter.  Ask your nurse if you have questions.     Bring a paper prescription for each of these  medications     amphetamine-dextroamphetamine 10 MG per 24 hr capsule    amphetamine-dextroamphetamine 10 MG per 24 hr capsule    amphetamine-dextroamphetamine 10 MG per 24 hr capsule                Primary Care Provider Office Phone # Fax #    Danielle Goodwin [phone]       [facility] [address] [address]        Thank you!     Thank you for choosing [facility]  for your care. Our goal is always to provide you with excellent care. Hearing back from our patients is one way we can continue to improve our services. Please take a few minutes to complete the written survey that you may receive in the mail after your visit with us. Thank you!             Your Updated Medication List - Protect others around you: Learn how to safely use, store and throw away your medicines at www.disposemymeds.org.          This list is accurate as of: 3/22/17  2:50 PM.  Always use your most recent med list.                   Brand Name Dispense Instructions for use    * amphetamine-dextroamphetamine 10 MG per 24 hr capsule    ADDERALL XR    30 capsule    Take 1 capsule (10 mg) by mouth daily       * amphetamine-dextroamphetamine 10 MG per 24 hr capsule    ADDERALL XR    30 capsule    Take 1 capsule (10 mg) by mouth daily       * amphetamine-dextroamphetamine 10 MG per 24 hr capsule   Start taking on:  4/22/2017    ADDERALL XR    30 capsule    Take 1 capsule (10 mg) by mouth daily       * amphetamine-dextroamphetamine 10 MG per 24 hr capsule   Start taking on:  5/23/2017    ADDERALL XR    30 capsule    Take 1 capsule (10 mg) by mouth daily       drospirenone-ethinyl estradiol 3-0.03 MG per tablet    HAKAN    84 tablet    Take 1 tablet by mouth daily       * EPIPEN IJ          * EPINEPHrine 0.3 MG/0.3ML injection     0.6 mL    Inject 0.3 mLs (0.3 mg) into the muscle as needed for anaphylaxis       pantoprazole 40 MG EC tablet    PROTONIX     Take 1 tablet by mouth daily       *  Notice:  This list has 6 medication(s) that are the same as other medications prescribed for you. Read the directions carefully, and ask your doctor or other care provider to review them with you.

## 2017-03-22 NOTE — NURSING NOTE
"Chief Complaint   Patient presents with     Recheck Medication     Adderall        Initial /62 (BP Location: Right arm, Cuff Size: Adult Regular)  Pulse 92  Temp 98.1  F (36.7  C) (Oral)  Ht 5' 4\" (1.626 m)  Wt 139 lb 6.4 oz (63.2 kg)  SpO2 98%  BMI 23.93 kg/m2 Estimated body mass index is 23.93 kg/(m^2) as calculated from the following:    Height as of this encounter: 5' 4\" (1.626 m).    Weight as of this encounter: 139 lb 6.4 oz (63.2 kg).  Medication Reconciliation: complete   Molly Lucero MA      "

## 2017-03-22 NOTE — PROGRESS NOTES
"  SUBJECTIVE:                                                    Mariposa Nunez is a 22 year old female who presents to clinic today for the following health issues:      Medication Followup of Adderall     Taking Medication as prescribed: yes    Side Effects:  She said when she crashes she feels tired, and grumpy, lack of appetite, only on the first day of taking this medication during the week. She doesn't take it every day mostly for school. Subsequent days are not an issue.    Medication Helping Symptoms:  Yes, she likes it compared to her last ADHD med, as it seems to \"work better\" and \"last longer.\"     Denies cardiac issues, palpitations, weight loss, consistent lack of appetite, GI symptoms. She only has emotional lability and either insomnia vs end of day \"crashing\" with first day of use each week. These symptoms are tolerable to the patient.      Patient Active Problem List   Diagnosis     Anxiety state     History of bee sting allergy     ADHD (attention deficit hyperactivity disorder), inattentive type     History reviewed. No pertinent surgical history.    Social History   Substance Use Topics     Smoking status: Never Smoker     Smokeless tobacco: Not on file     Alcohol use No     Family History   Problem Relation Age of Onset     Neurologic Disorder Other      grandmother, aunts and uncles     Asthma Mother      Hypertension Mother      Asthma Sister      Congenital Anomalies Sister      Hypertension Other      also grandparents and uncle     Lipids Other      grandparents     Family History Negative Father      Lupus Maternal Aunt          Current Outpatient Prescriptions   Medication Sig Dispense Refill     amphetamine-dextroamphetamine (ADDERALL XR) 10 MG per 24 hr capsule Take 1 capsule (10 mg) by mouth daily 30 capsule 0     pantoprazole (PROTONIX) 40 MG EC tablet Take 1 tablet by mouth daily  4     drospirenone-ethinyl estradiol (HAKAN) 3-0.03 MG per tablet Take 1 tablet by mouth daily 84 " "tablet 3     EPINEPHrine (EPIPEN) 0.3 MG/0.3ML injection Inject 0.3 mLs (0.3 mg) into the muscle as needed for anaphylaxis 0.6 mL 11     EPINEPHrine (EPIPEN IJ)        Allergies   Allergen Reactions     Bees      Severe swelling at sting site     Hpv Vaccine Recombinant (Yeast Derived) [Quadrivalent Hpv, 6,11,16,18]      Sores in mouth and vagina     BP Readings from Last 3 Encounters:   03/22/17 120/62   01/18/17 108/70   01/09/17 108/68    Wt Readings from Last 3 Encounters:   03/22/17 139 lb 6.4 oz (63.2 kg)   01/18/17 138 lb (62.6 kg) (66 %)*   01/09/17 139 lb 6.4 oz (63.2 kg) (68 %)*     * Growth percentiles are based on [facility] 2-20 Years data.         Reviewed and updated as needed this visit by clinical staff and provider  Tobacco  Allergies  Med Hx  Surg Hx  Fam Hx  Soc Hx      ROS:  Constitutional, cardiovascular, gu, neuro and psych systems are negative, except as otherwise noted.    OBJECTIVE:                                                    /62 (BP Location: Right arm, Cuff Size: Adult Regular)  Pulse 92  Temp 98.1  F (36.7  C) (Oral)  Ht 5' 4\" (1.626 m)  Wt 139 lb 6.4 oz (63.2 kg)  SpO2 98%  BMI 23.93 kg/m2  Body mass index is 23.93 kg/(m^2).  GENERAL: healthy, alert and no distress  EYES: Eyes grossly normal to inspection, PERRL and conjunctivae and sclerae normal  RESP: lungs clear to auscultation - no rales, rhonchi or wheezes  CV: regular rate and rhythm, normal S1 S2, no S3 or S4, no murmur, click or rub, no peripheral edema and peripheral pulses strong  MS: no gross musculoskeletal defects noted  NEURO: Normal strength and tone, mentation intact and speech normal  PSYCH: mentation appears normal, affect normal/bright     ASSESSMENT/PLAN:                                                      (F90.0) ADHD (attention deficit hyperactivity disorder), inattentive type  Comment: Medication started by PCP in January. Treating symptoms well per patient, with tolerable side effect profile. " Vitals stable. Since this is the second visit for follow up of this medication and dose is stable, will refill x 3 months and have patient follow up for refill during summer break from college.   Plan:   - refill amphetamine-dextroamphetamine (ADDERALL XR) 10 MG per 24 hr capsule  - follow up in 3 months for refill, sooner if medication not effective or pt having significant side effects    (K21.9) Gastroesophageal reflux disease, esophagitis presence not specified  (primary encounter diagnosis)  Comment: History of GERD. Seen previously by GI (see scanned note in EPIC). Needs PPI refill.  Plan:   - protonix refilled     See AVS for additional instructions    Pt discussed with Dr. Mcmahon who agrees with plan    Yefri White MD  [facility]2 [facility]    I have discussed this patient with Dr. White and agree with documentation as noted above.     Jatin Mcmahon MD

## 2017-03-22 NOTE — PATIENT INSTRUCTIONS
- your medication was refilled for the next 3 months  - return to clinic in 3 months to discuss your symptoms and to receive a refill  - call the clinic if you have any concerns about your current medicine (effectiveness, side effects)  - call with questions  Attention Deficit/Hyperactivity Disorder (ADHD, ADD) in Adults  You ve always had trouble concentrating. Your mind wanders, and it s hard to finish tasks. As a result, you didn t do well in school. And now, you often struggle with your job. Sometimes this makes you mitchell or depressed. These may be symptoms of attention deficit  hyperactivity disorder (ADHD) or may still be referred to as attention deficit disorder (ADD). To find out more, talk to your healthcare provider. He or she can offer guidance and support.  Symptoms  of ADD in adults  For an adult to be diagnosed with ADHD, the symptoms must have been present since childhood. The symptoms may include:    Trouble thinking things through    Low self-esteem    Depression    Trouble holding a job    Memory problems    Problems with a marriage or relationship    Lack of discipline   What is ADHD?  Attention deficit hyperactivity disorder makes it hard to focus your mind. You may daydream a lot. And you may be restless much of the time. As a result, you may have trouble with detailed or boring work. And it may be hard to stick with one project for very long. You also may forget things. Or, you may miss key points during a lecture or meeting. You may even have trouble sitting through a movie or concert. At times, you may feel frustrated or angry. This can affect your relationships with others.  Who does it affect?  ADHD begins in childhood. Sometimes, your symptoms may improve as you get older. But they also may persist into your adult years. ADHD is often thought of as a  kid s problem.  That s why it s often missed in adults. In fact, many parents learn they have ADHD when their children are  diagnosed.  What causes it?  The exact cause of ADHD isn t known. The disorder does run in families. Having one parent with ADHD makes it more likely you ll have it too. And the part of your brain that controls attention may be involved. Certain brain chemicals that are out of balance may also play a role.  What can be done?  The first step is finding out if you really have ADHD. Your doctor will use special guidelines to diagnose the disorder. Most adults with ADHD are greatly helped by therapy and coaching. In some cases, your doctor may also prescribe medicine to ease your symptoms.  Resources    National Resource Center on AD/HDwww.elks5mlak.org    Attention Deficit Disorder Associationwww.add.org     8154-6774 [employer]. [address] [place] [address], [place] [address]. All rights reserved. This information is not intended as a substitute for professional medical care. Always follow your healthcare professional's instructions.        Patient Education    Amphetamine Aspartate, Amphetamine Sulfate, Dextroamphetamine Saccharate, Dextroamphetamine Sulfate Oral capsule, extended-release    Amphetamine Aspartate, Amphetamine Sulfate, Dextroamphetamine Saccharate, Dextroamphetamine Sulfate Oral tablet  Amphetamine Aspartate, Amphetamine Sulfate, Dextroamphetamine Saccharate, Dextroamphetamine Sulfate Oral tablet  What is this medicine?  AMPHETAMINE; DEXTROAMPHETAMINE(am FET a meen; dex troe am FET a meen) is used to treat attention-deficit hyperactivity disorder (ADHD). It may also be used for narcolepsy. Federal law prohibits giving this medicine to any person other than the person for whom it was prescribed. Do not share this medicine with anyone else.  This medicine may be used for other purposes; ask your health care provider or pharmacist if you have questions.  What should I tell my health care provider before I take this medicine?  They need to know if you have any of these conditions:    anxiety or  panic attacks    circulation problems in fingers and toes    glaucoma    hardening or blockages of the arteries or heart blood vessels    heart disease or a heart defect    high blood pressure    history of a drug or alcohol abuse problem    history of stroke    kidney disease    liver disease    mental illness    seizures    suicidal thoughts, plans, or attempt; a previous suicide attempt by you or a family member    thyroid disease    Tourette's syndrome    an unusual or allergic reaction to dextroamphetamine, other amphetamines, other medicines, foods, dyes, or preservatives    pregnant or trying to get pregnant    breast-feeding  How should I use this medicine?  Take this medicine by mouth with a glass of water. Follow the directions on the prescription label. Take your doses at regular intervals. Do not take your medicine more often than directed. Do not suddenly stop your medicine. You must gradually reduce the dose or you may feel withdrawal effects. Ask your doctor or health care professional for advice.  Talk to your pediatrician regarding the use of this medicine in children. Special care may be needed. While this drug may be prescribed for children as young as 3 years for selected conditions, precautions do apply.  Overdosage: If you think you have taken too much of this medicine contact a poison control center or emergency room at once.  NOTE: This medicine is only for you. Do not share this medicine with others.  What if I miss a dose?  If you miss a dose, take it as soon as you can. If it is almost time for your next dose, take only that dose. Do not take double or extra doses.  What may interact with this medicine?  Do not take this medicine with any of the following medications:    MAOIS like Carbex, Eldepryl, Marplan, Nardil, and Parnate    other stimulant medicines for attention disorders, weight loss, or to stay awake  This medicine may also interact with the following  medications:    acetazolamide    ammonium chloride    antacids    ascorbic acid    atomoxetine    caffeine    certain medicines for blood pressure    certain medicines for depression, anxiety, or psychotic disturbances    certain medicines for seizures like carbamazepine, phenobarbital, phenytoin    certain medicines for stomach problems like cimetidine, famotidine, omeprazole, lansoprazole    cold or allergy medicines    glutamic acid    lithium    meperidine    methenamine; sodium acid phosphate    narcotic medicines for pain    norepinephrine    phenothiazines like chlorpromazine, mesoridazine, prochlorperazine, thioridazine    sodium acid phosphate    sodium bicarbonate  This list may not describe all possible interactions. Give your health care provider a list of all the medicines, herbs, non-prescription drugs, or dietary supplements you use. Also tell them if you smoke, drink alcohol, or use illegal drugs. Some items may interact with your medicine.  What should I watch for while using this medicine?  Visit your doctor or health care professional for regular checks on your progress. This prescription requires that you follow special procedures with your doctor and pharmacy. You will need to have a new written prescription from your doctor every time you need a refill.  This medicine may affect your concentration, or hide signs of tiredness. Until you know how this medicine affects you, do not drive, ride a bicycle, use machinery, or do anything that needs mental alertness.  Tell your doctor or health care professional if this medicine loses its effects, or if you feel you need to take more than the prescribed amount. Do not change the dosage without talking to your doctor or health care professional.  Decreased appetite is a common side effect when starting this medicine. Eating small, frequent meals or snacks can help. Talk to your doctor if you continue to have poor eating habits. Height and weight growth  of a child taking this medicine will be monitored closely.  Do not take this medicine close to bedtime. It may prevent you from sleeping.  If you are going to need surgery, a MRI, CT scan, or other procedure, tell your doctor that you are taking this medicine. You may need to stop taking this medicine before the procedure.  Tell your doctor or healthcare professional right away if you notice unexplained wounds on your fingers and toes while taking this medicine. You should also tell your healthcare provider if you experience numbness or pain, changes in the skin color, or sensitivity to temperature in your fingers or toes.  What side effects may I notice from receiving this medicine?  Side effects that you should report to your doctor or health care professional as soon as possible:    allergic reactions like skin rash, itching or hives, swelling of the face, lips, or tongue    changes in vision    chest pain or chest tightness    confusion, trouble speaking or understanding    fast, irregular heartbeat    fingers or toes feel numb, cool, painful    hallucination, loss of contact with reality    high blood pressure    males: prolonged or painful erection    seizures    severe headaches    shortness of breath    suicidal thoughts or other mood changes    trouble walking, dizziness, loss of balance or coordination    uncontrollable head, mouth, neck, arm, or leg movements  Side effects that usually do not require medical attention (report to your doctor or health care professional if they continue or are bothersome):    anxious    headache    loss of appetite    nausea, vomiting    trouble sleeping    weight loss  This list may not describe all possible side effects. Call your doctor for medical advice about side effects. You may report side effects to FDA at 8-990-FDA-2690.  Where should I keep my medicine?  Keep out of the reach of children. This medicine can be abused. Keep your medicine in a safe place to protect  it from theft. Do not share this medicine with anyone. Selling or giving away this medicine is dangerous and against the law.  Store at room temperature between 15 and 30 degrees C (59 and 86 degrees F). Keep container tightly closed. Throw away any unused medicine after the expiration date. Dispose of properly. This medicine may cause accidental overdose and death if it is taken by other adults, children, or pets. Mix any unused medicine with a substance like cat litter or coffee grounds. Then throw the medicine away in a sealed container like a sealed bag or a coffee can with a lid. Do not use the medicine after the expiration date.  NOTE: This sheet is a summary. It may not cover all possible information. If you have questions about this medicine, talk to your doctor, pharmacist, or health care provider.  NOTE:This sheet is a summary. It may not cover all possible information. If you have questions about this medicine, talk to your doctor, pharmacist, or health care provider. Copyright  2016 [employer]

## 2017-04-06 ENCOUNTER — MYC REFILL (OUTPATIENT)
Dept: PEDIATRICS | Facility: CLINIC | Age: 22
End: 2017-04-06

## 2017-04-06 DIAGNOSIS — F32.81 PMDD (PREMENSTRUAL DYSPHORIC DISORDER): ICD-10-CM

## 2017-04-06 RX ORDER — DROSPIRENONE AND ETHINYL ESTRADIOL 0.03MG-3MG
1 KIT ORAL DAILY
Qty: 84 TABLET | Refills: 3 | Status: CANCELLED | OUTPATIENT
Start: 2017-04-06

## 2017-04-06 NOTE — TELEPHONE ENCOUNTER
Message from [employer]:  Original authorizing provider: Nadine Ayon MD    Mariposa Nunez would like a refill of the following medications:  drospirenone-ethinyl estradiol (HAKAN) 3-0.03 MG per tablet [Nadine Ayon MD]    Preferred pharmacy: [facility] [address] [place] [address]    Comment:

## 2017-04-07 NOTE — TELEPHONE ENCOUNTER
Pt. Was sent in a years worth to local [facility] pharmacy on 1/9/2017.    Pt. Notified of this via [place]. Pt. Picked up prescription. Will cancel refill request.    Rose Marie Sen RN, BSN, PHN

## 2017-04-25 LAB
ALT SERPL-CCNC: 12 U/L (ref 10–60)
AST SERPL-CCNC: 18 IU/L (ref 14–37)
CREAT SERPL-MCNC: 0.91 MG/DL (ref 0.44–1.03)
GFR SERPL CREATININE-BSD FRML MDRD: >60 ML/MIN/1.73M2
GLUCOSE SERPL-MCNC: 72 MG/DL (ref 70–99)
HBA1C MFR BLD: 5 % (ref 4–5.6)
POTASSIUM SERPL-SCNC: 4 MEQ/L (ref 3.6–5.4)
TSH SERPL-ACNC: 4 MIU/L (ref 0.4–4.2)

## 2017-04-26 ENCOUNTER — TRANSFERRED RECORDS (OUTPATIENT)
Dept: HEALTH INFORMATION MANAGEMENT | Facility: CLINIC | Age: 22
End: 2017-04-26

## 2017-05-15 ENCOUNTER — OFFICE VISIT (OUTPATIENT)
Dept: PEDIATRICS | Facility: CLINIC | Age: 22
End: 2017-05-15
Payer: COMMERCIAL

## 2017-05-15 VITALS
HEART RATE: 88 BPM | SYSTOLIC BLOOD PRESSURE: 110 MMHG | HEIGHT: 64 IN | DIASTOLIC BLOOD PRESSURE: 68 MMHG | WEIGHT: 138.1 LBS | TEMPERATURE: 98.3 F | BODY MASS INDEX: 23.58 KG/M2

## 2017-05-15 DIAGNOSIS — R00.2 PALPITATIONS: ICD-10-CM

## 2017-05-15 DIAGNOSIS — R94.6 ABNORMAL FINDING ON THYROID FUNCTION TEST: ICD-10-CM

## 2017-05-15 DIAGNOSIS — D50.9 MICROCYTIC ANEMIA: ICD-10-CM

## 2017-05-15 DIAGNOSIS — R55 SYNCOPE, UNSPECIFIED SYNCOPE TYPE: Primary | ICD-10-CM

## 2017-05-15 DIAGNOSIS — F90.0 ADHD (ATTENTION DEFICIT HYPERACTIVITY DISORDER), INATTENTIVE TYPE: ICD-10-CM

## 2017-05-15 DIAGNOSIS — R12 HEARTBURN: ICD-10-CM

## 2017-05-15 LAB
ERYTHROCYTE [DISTWIDTH] IN BLOOD BY AUTOMATED COUNT: 13.6 % (ref 10–15)
HCT VFR BLD AUTO: 36 % (ref 35–47)
HGB BLD-MCNC: 11.5 G/DL (ref 11.7–15.7)
MCH RBC QN AUTO: 26.1 PG (ref 26.5–33)
MCHC RBC AUTO-ENTMCNC: 31.9 G/DL (ref 31.5–36.5)
MCV RBC AUTO: 82 FL (ref 78–100)
PLATELET # BLD AUTO: 361 10E9/L (ref 150–450)
RBC # BLD AUTO: 4.41 10E12/L (ref 3.8–5.2)
T3FREE SERPL-MCNC: 3.2 PG/ML (ref 2.3–4.2)
WBC # BLD AUTO: 8.6 10E9/L (ref 4–11)

## 2017-05-15 PROCEDURE — 99215 OFFICE O/P EST HI 40 MIN: CPT | Performed by: INTERNAL MEDICINE

## 2017-05-15 PROCEDURE — 36415 COLL VENOUS BLD VENIPUNCTURE: CPT | Performed by: INTERNAL MEDICINE

## 2017-05-15 PROCEDURE — 84443 ASSAY THYROID STIM HORMONE: CPT | Performed by: INTERNAL MEDICINE

## 2017-05-15 PROCEDURE — 82728 ASSAY OF FERRITIN: CPT | Performed by: INTERNAL MEDICINE

## 2017-05-15 PROCEDURE — 80053 COMPREHEN METABOLIC PANEL: CPT | Performed by: INTERNAL MEDICINE

## 2017-05-15 PROCEDURE — 84481 FREE ASSAY (FT-3): CPT | Performed by: INTERNAL MEDICINE

## 2017-05-15 PROCEDURE — 85027 COMPLETE CBC AUTOMATED: CPT | Performed by: INTERNAL MEDICINE

## 2017-05-15 PROCEDURE — 93000 ELECTROCARDIOGRAM COMPLETE: CPT | Performed by: INTERNAL MEDICINE

## 2017-05-15 PROCEDURE — 83550 IRON BINDING TEST: CPT | Performed by: INTERNAL MEDICINE

## 2017-05-15 PROCEDURE — 83540 ASSAY OF IRON: CPT | Performed by: INTERNAL MEDICINE

## 2017-05-15 PROCEDURE — 84439 ASSAY OF FREE THYROXINE: CPT | Performed by: INTERNAL MEDICINE

## 2017-05-15 NOTE — PATIENT INSTRUCTIONS
I'll let you know how your blood tests look.  Start the 1 iron tablet daily and increase to twice daily after a few days if you tolerate it.   If your recheck iron and hemoglobin are normal, you can stop this.    Take the iron 2-3 times a day for 12 weeks, then we will recheck your levels. If normal, you can stop the iron, but we still need to recheck 12 weeks after that to make sure your levels don't start dropping again.    [facility] will call you to set up your appointment with Dr. Villarreal. If you have not heard from them within 1-2 days, please call them at [phone].

## 2017-05-15 NOTE — PROGRESS NOTES
SUBJECTIVE:                                                    Mariposa Nunez is a 22 year old female who presents to clinic today for the following health issues:    States when she was at school on 4/22, her friend called her saying she felt dizzy, so she ran into the bathroom right after jumping out of bed. Friend didn't seem well, but then started telling her she thought she was going to throw up, which Mariposa is scared of. States she could feel her head was pounding, not vertigo but felt sweaty and nauseated.  Was walking over to her and ran into the shower doors and hit her forehead. Remembers sitting down because she didn't feel good. Friend heard a bang and then went to find her and told her she was lying on the floor. Then woke up laying on her back with her friend there. Drank some water and felt better. Sat with her friend in the bathroom for a couple of hours talking until they both felt better and then went back to bed. The next morning did have a little headache. Called her mom the next day and went to the doctor 2 days later. Had blood tests done a few days later after that.    Since she had been dizzy a few times that week on standing up fast. In addition, 2 summers she woke up in the middle of the night feeling like she was going to throw up. Got up out of bed and was in the kitchen leaning on her elbows over the sink. Her mom saw her, her knees buckled and her mom caught her before she hit the ground. Didn't fully lose consciousness that they are aware of    Has been refused to donate blood in the past because was mildly anemic. No blood in stool. Does have a family history of ulcerative colitis.    On hollis. States the first 2 days of her period, the discharge is dark brown, then turns to normal red. Changing a pad or tampon 3 times a day. Periods last 4-5 days max.    From last Monday until Thursday she had diarrhea. Stomach would cramp after eating. That is better now, but still has sore  "stomach. No blood in stool.     Was taking ADHD medications more consistently in the second half of the semester. Noticed that by the 3rd or 4th day of the week, she was grumpy, felt heart was a little \"racy\". Would like to switch medication to see if a different one she would tolerate better. Notices once in a while she feels like her heart is racy like it is bad acid reflux.     Problem list and histories reviewed & adjusted, as indicated.  Additional history: as documented    Patient Active Problem List   Diagnosis     Anxiety state     History of bee sting allergy     ADHD (attention deficit hyperactivity disorder), inattentive type     History reviewed. No pertinent surgical history.    Social History   Substance Use Topics     Smoking status: Never Smoker     Smokeless tobacco: Not on file     Alcohol use No     Family History   Problem Relation Age of Onset     Neurologic Disorder Other      grandmother, aunts and uncles     Asthma Mother      Hypertension Mother      Asthma Sister      Congenital Anomalies Sister      Hypertension Other      also grandparents and uncle     Lipids Other      grandparents     Family History Negative Father      Lupus Maternal Aunt          Current Outpatient Prescriptions   Medication Sig Dispense Refill     drospirenone-ethinyl estradiol (HAKAN) 3-0.03 MG per tablet Take 1 tablet by mouth daily 84 tablet 3     EPINEPHrine (EPIPEN) 0.3 MG/0.3ML injection Inject 0.3 mLs (0.3 mg) into the muscle as needed for anaphylaxis 0.6 mL 11     pantoprazole (PROTONIX) 40 MG EC tablet Take 1 tablet (40 mg) by mouth daily 30 tablet 4     EPINEPHrine (EPIPEN IJ)          Reviewed and updated as needed this visit by clinical staff  Tobacco  Allergies  Meds  Med Hx  Surg Hx  Fam Hx  Soc Hx      Reviewed and updated as needed this visit by Provider         ROS:  Constitutional, HEENT, cardiovascular, pulmonary, GI, , musculoskeletal, neuro, skin, endocrine and psych systems are " "negative, except as otherwise noted.    OBJECTIVE:                                                    /68  Pulse 88  Temp 98.3  F (36.8  C) (Oral)  Ht 5' 4.17\" (1.63 m)  Wt 138 lb 1.6 oz (62.6 kg)  BMI 23.58 kg/m2  Body mass index is 23.58 kg/(m^2).  GENERAL: healthy, alert and no distress  EYES: Eyes grossly normal to inspection, PERRL and conjunctivae and sclerae normal  HENT: ear canals and TM's normal, nose and mouth without ulcers or lesions  NECK: no adenopathy, no asymmetry, masses, or scars and thyroid normal to palpation  RESP: lungs clear to auscultation - no rales, rhonchi or wheezes  CV: regular rate and rhythm, normal S1 S2, no S3 or S4, no murmur, click or rub, no peripheral edema and peripheral pulses strong  ABDOMEN: soft, nontender, no hepatosplenomegaly, no masses and bowel sounds normal  MS: no gross musculoskeletal defects noted, no edema  NEURO: Normal strength and tone, mentation intact and speech normal  PSYCH: mentation appears normal, affect normal/bright    Diagnostic Test Results:  EKG - negative     ASSESSMENT/PLAN:                                                      1. Syncope, unspecified syncope type  Recent episode. Had vasovagal symptoms prior to event. Previous echo normal in 2011. EKG unremarkable here. Given syncope in her young age, would have her see cardiology.  - CBC with platelets  - Iron and iron binding capacity  - Ferritin  - TSH  - T4 FREE  - T3 Free  - EKG 12-lead complete w/read - Clinics  - Comprehensive metabolic panel  - CARDIOLOGY EVAL ADULT REFERRAL    2. Palpitations  Normal exam. EKG unremarkable. Notices when she is having heartburn or when taking her adderall. May need event monitor. Will defer to cardiology and recheck tsh.  - CBC with platelets  - Iron and iron binding capacity  - Ferritin  - TSH  - T4 FREE  - T3 Free  - EKG 12-lead complete w/read - Clinics  - Comprehensive metabolic panel  - CARDIOLOGY EVAL ADULT REFERRAL  - EKG 12-lead complete " w/read - Clinics    3. Microcytic anemia  Plan start iron and recheck. Previous 9 day periods for an extended time. Now on OCP. Given family history of colitis, would check stool for blood, as well.    4. ADHD (attention deficit hyperactivity disorder), inattentive type  Will hold off on trying a new medication until cardiology evaluation.      5. Heartburn  For now, will continue PPI. May need further evaluation if remains anemic.      6. Abnormal finding on thyroid function test  Recheck labs.      Patient Instructions   I'll let you know how your blood tests look.  Start the 1 iron tablet daily and increase to twice daily after a few days if you tolerate it.   If your recheck iron and hemoglobin are normal, you can stop this.    Take the iron 2-3 times a day for 12 weeks, then we will recheck your levels. If normal, you can stop the iron, but we still need to recheck 12 weeks after that to make sure your levels don't start dropping again.    [facility] will call you to set up your appointment with Dr. Villarreal. If you have not heard from them within 1-2 days, please call them at [phone].          Nadine Ayon MD  [facility]

## 2017-05-15 NOTE — NURSING NOTE
"Chief Complaint   Patient presents with     Anemia     at [facility] had labs done        Initial /68  Pulse 88  Temp 98.3  F (36.8  C) (Oral)  Ht 5' 4.17\" (1.63 m)  Wt 138 lb 1.6 oz (62.6 kg)  BMI 23.58 kg/m2 Estimated body mass index is 23.58 kg/(m^2) as calculated from the following:    Height as of this encounter: 5' 4.17\" (1.63 m).    Weight as of this encounter: 138 lb 1.6 oz (62.6 kg).  Medication Reconciliation: complete  "

## 2017-05-15 NOTE — MR AVS SNAPSHOT
After Visit Summary   5/15/2017    Mariposa Nunez    MRN: [id number]           Patient Information     Date Of Birth          1995        Visit Information        Provider Department      5/15/2017 10:50 AM Nadine Villarreal MD [facility]        Today's Diagnoses     Syncope, unspecified syncope type    -  1    Palpitations        Microcytic anemia        Heartburn        ADHD (attention deficit hyperactivity disorder), inattentive type        Abnormal finding on thyroid function test          Care Instructions    I'll let you know how your blood tests look.  Start the 1 iron tablet daily and increase to twice daily after a few days if you tolerate it.   If your recheck iron and hemoglobin are normal, you can stop this.    Take the iron 2-3 times a day for 12 weeks, then we will recheck your levels. If normal, you can stop the iron, but we still need to recheck 12 weeks after that to make sure your levels don't start dropping again.    [facility] will call you to set up your appointment with Dr. Villarreal. If you have not heard from them within 1-2 days, please call them at [phone].            Follow-ups after your visit        Additional Services     CARDIOLOGY EVAL ADULT REFERRAL       Your provider has referred you to:  Dr. Lexa Villarreal    Please be aware that coverage of these services is subject to the terms and limitations of your health insurance plan.  Call member services at your health plan with any benefit or coverage questions.      Type of Referral:  New Cardiology Consult    Timeframe requested:  Within 1 month    Please bring the following to your appointment:  >>   Any x-rays, CTs or MRIs which have been performed.  Contact the facility where they were done to arrange for  prior to your scheduled appointment.    >>   List of current medications  >>   This referral request   >>   Any documents/labs given to you for this referral    "               Who to contact     If you have questions or need follow up information about today's clinic visit or your schedule please contact [facility] directly at [phone].  Normal or non-critical lab and imaging results will be communicated to you by MyChart, letter or phone within 4 business days after the clinic has received the results. If you do not hear from us within 7 days, please contact the clinic through [employer] or phone. If you have a critical or abnormal lab result, we will notify you by phone as soon as possible.  Submit refill requests through [employer] or call your pharmacy and they will forward the refill request to us. Please allow 3 business days for your refill to be completed.          Additional Information About Your Visit        [employer] Information     [employer] gives you secure access to your electronic health record. If you see a primary care provider, you can also send messages to your care team and make appointments. If you have questions, please call your primary care clinic.  If you do not have a primary care provider, please call [phone] and they will assist you.        Care EveryWhere ID     This is your Care EveryWhere ID. This could be used by other organizations to access your [place] medical records  [id number]        Your Vitals Were     Pulse Temperature Height BMI (Body Mass Index)          88 98.3  F (36.8  C) (Oral) 5' 4.17\" (1.63 m) 23.58 kg/m2         Blood Pressure from Last 3 Encounters:   05/15/17 110/68   03/22/17 120/62   01/18/17 108/70    Weight from Last 3 Encounters:   05/15/17 138 lb 1.6 oz (62.6 kg)   03/22/17 139 lb 6.4 oz (63.2 kg)   01/18/17 138 lb (62.6 kg) (66 %)*     * Growth percentiles are based on CDC 2-20 Years data.              We Performed the Following     CARDIOLOGY EVAL ADULT REFERRAL     CBC with platelets     Comprehensive metabolic panel     EKG 12-lead complete w/read - Clinics     EKG 12-lead complete w/read - Clinics "     Ferritin     Iron and iron binding capacity     T3 Free     T4 FREE     TSH        Primary Care Provider Office Phone # Fax #    Danielle Goodwin [phone]       [facility] [address] [facility] [address]        Thank you!     Thank you for choosing [facility]  for your care. Our goal is always to provide you with excellent care. Hearing back from our patients is one way we can continue to improve our services. Please take a few minutes to complete the written survey that you may receive in the mail after your visit with us. Thank you!             Your Updated Medication List - Protect others around you: Learn how to safely use, store and throw away your medicines at www.disposemymeds.org.          This list is accurate as of: 5/15/17 12:16 PM.  Always use your most recent med list.                   Brand Name Dispense Instructions for use    drospirenone-ethinyl estradiol 3-0.03 MG per tablet    HAKAN    84 tablet    Take 1 tablet by mouth daily       * EPIPEN IJ          * EPINEPHrine 0.3 MG/0.3ML injection     0.6 mL    Inject 0.3 mLs (0.3 mg) into the muscle as needed for anaphylaxis       pantoprazole 40 MG EC tablet    PROTONIX    30 tablet    Take 1 tablet (40 mg) by mouth daily       * Notice:  This list has 2 medication(s) that are the same as other medications prescribed for you. Read the directions carefully, and ask your doctor or other care provider to review them with you.

## 2017-05-16 LAB
ALBUMIN SERPL-MCNC: 3.2 G/DL (ref 3.4–5)
ALP SERPL-CCNC: 79 U/L (ref 40–150)
ALT SERPL W P-5'-P-CCNC: 18 U/L (ref 0–50)
ANION GAP SERPL CALCULATED.3IONS-SCNC: 7 MMOL/L (ref 3–14)
AST SERPL W P-5'-P-CCNC: 16 U/L (ref 0–45)
BILIRUB SERPL-MCNC: 0.3 MG/DL (ref 0.2–1.3)
BUN SERPL-MCNC: 13 MG/DL (ref 7–30)
CALCIUM SERPL-MCNC: 9.1 MG/DL (ref 8.5–10.1)
CHLORIDE SERPL-SCNC: 104 MMOL/L (ref 94–109)
CO2 SERPL-SCNC: 27 MMOL/L (ref 20–32)
CREAT SERPL-MCNC: 0.67 MG/DL (ref 0.52–1.04)
FERRITIN SERPL-MCNC: 6 NG/ML (ref 12–150)
GFR SERPL CREATININE-BSD FRML MDRD: ABNORMAL ML/MIN/1.7M2
GLUCOSE SERPL-MCNC: 69 MG/DL (ref 70–99)
IRON SATN MFR SERPL: 10 % (ref 15–46)
IRON SERPL-MCNC: 49 UG/DL (ref 35–180)
POTASSIUM SERPL-SCNC: 4.4 MMOL/L (ref 3.4–5.3)
PROT SERPL-MCNC: 7.8 G/DL (ref 6.8–8.8)
SODIUM SERPL-SCNC: 138 MMOL/L (ref 133–144)
T4 FREE SERPL-MCNC: 1.13 NG/DL (ref 0.76–1.46)
TIBC SERPL-MCNC: 505 UG/DL (ref 240–430)
TSH SERPL DL<=0.05 MIU/L-ACNC: 1.51 MU/L (ref 0.4–4)

## 2017-05-17 ENCOUNTER — MYC MEDICAL ADVICE (OUTPATIENT)
Dept: PEDIATRICS | Facility: CLINIC | Age: 22
End: 2017-05-17

## 2017-05-17 NOTE — TELEPHONE ENCOUNTER
Please review blood work done on 5/15.  Should pt be taking one iron pill or two?  Ok to wait for cardiology visit on 6/23?  Juli Poon RN  Message handled by Nurse Triage.

## 2017-05-21 PROCEDURE — 82274 ASSAY TEST FOR BLOOD FECAL: CPT | Performed by: INTERNAL MEDICINE

## 2017-05-22 NOTE — TELEPHONE ENCOUNTER
Please call Dr. Villarreal's nurse/cardiology. He did let me know that he can usually add patients on to his ultrasound days in clinic. Would like to see if we can get her in sooner.  Nadine Villarreal M.D.

## 2017-05-23 ENCOUNTER — TELEPHONE (OUTPATIENT)
Dept: CARDIOLOGY | Facility: CLINIC | Age: 22
End: 2017-05-23

## 2017-05-23 DIAGNOSIS — D50.9 MICROCYTIC ANEMIA: ICD-10-CM

## 2017-05-23 LAB — HEMOCCULT STL QL IA: NEGATIVE

## 2017-05-23 NOTE — TELEPHONE ENCOUNTER
The nurse from Dr. Villarreal's calls back-there are no openings until July with Dr. Villarreal, but since patient is new to the clinic, she can schedule appointment with other cardiologists and be seen sooner.    Routing to Randa to call patient and discuss.  Patient/family can call [phone] to schedule with another cardiologist.  Juli Poon RN  Message handled by Nurse Triage.

## 2017-05-23 NOTE — TELEPHONE ENCOUNTER
Called Dr Villarreal's nurse at [phone] and lm on machine for Dr Villarreal's office to call us and discuss as stated below.  Randa Rodríguez LPN

## 2017-05-23 NOTE — TELEPHONE ENCOUNTER
Received a call from Flaquita at  Dr. Villarreal office stating that the pt has a follow up appt with Dr. Villarreal on 6/23/17 and they would like the pt to be seen sooner.     Writer called nurse line back at the [facility]. Writer spoke with ELIEZER Friend and informed her that Dr. Villarreal does not have any openings prior to 6/23/17. Writer informed Ronna that pt could be seen sooner with a different cardiologist if Dr. Villarreal would like. Writer gave Ronna scheduling direct number to call if pt needs to be scheduled with another cardiologist.     Chart reviewed: OV 5/15/17: Dr. Villarreal OV note:    1. Syncope, unspecified syncope type  Recent episode. Had vasovagal symptoms prior to event. Previous echo normal in 2011. EKG unremarkable here. Given syncope in her young age, would have her see cardiology.  - CBC with platelets  - Iron and iron binding capacity  - Ferritin  - TSH  - T4 FREE  - T3 Free  - EKG 12-lead complete w/read - Clinics  - Comprehensive metabolic panel  - CARDIOLOGY EVAL ADULT REFERRAL     2. Palpitations  Normal exam. EKG unremarkable. Notices when she is having heartburn or when taking her adderall. May need event monitor. Will defer to cardiology and recheck tsh.  - CBC with platelets  - Iron and iron binding capacity  - Ferritin  - TSH  - T4 FREE  - T3 Free  - EKG 12-lead complete w/read - Clinics  - Comprehensive metabolic panel  - CARDIOLOGY EVAL ADULT REFERRAL  - EKG 12-lead complete w/read - Clinics

## 2017-05-24 NOTE — TELEPHONE ENCOUNTER
Please let her know she should keep her appointment for June.  I will send a message to Dr. Villarreal.  Nadine Villarreal M.D.

## 2017-05-30 ENCOUNTER — TELEPHONE (OUTPATIENT)
Dept: CARDIOLOGY | Facility: CLINIC | Age: 22
End: 2017-05-30

## 2017-05-30 NOTE — TELEPHONE ENCOUNTER
Called patient per Dr. Villarreal to see if she would like to be sooner due to her palpations.  Patient stated she started a new job and she has already requested the 23 of June off.  She said she can't get off to come sooner.  Explained to patient that if the palpations worsen or she has new symptoms to call the clinic.  Team 4 direct number given. No further questions

## 2017-06-21 ENCOUNTER — PRE VISIT (OUTPATIENT)
Dept: CARDIOLOGY | Facility: CLINIC | Age: 22
End: 2017-06-21

## 2017-06-21 PROBLEM — R55 SYNCOPE, UNSPECIFIED SYNCOPE TYPE: Status: ACTIVE | Noted: 2017-06-21

## 2017-06-23 ENCOUNTER — OFFICE VISIT (OUTPATIENT)
Dept: CARDIOLOGY | Facility: CLINIC | Age: 22
End: 2017-06-23
Attending: INTERNAL MEDICINE
Payer: COMMERCIAL

## 2017-06-23 VITALS
HEIGHT: 64 IN | SYSTOLIC BLOOD PRESSURE: 102 MMHG | DIASTOLIC BLOOD PRESSURE: 70 MMHG | OXYGEN SATURATION: 99 % | HEART RATE: 105 BPM | WEIGHT: 138 LBS | BODY MASS INDEX: 23.56 KG/M2

## 2017-06-23 DIAGNOSIS — R55 NEUROCARDIOGENIC SYNCOPE: Primary | ICD-10-CM

## 2017-06-23 PROCEDURE — 99203 OFFICE O/P NEW LOW 30 MIN: CPT | Performed by: INTERNAL MEDICINE

## 2017-06-23 NOTE — LETTER
6/23/2017    [facility]ad[facility]real [facility]  [facility] [address] [place] [address]    RE: Mariposa Nunze       Dear Colleague,    I had the pleasure of seeing Mariposa Nunez in the [facility].    Cardiology Consultation      Mariposa Nunez MRN# [id number]   YOB: 1995 Age: 22 year old   Date of Visit 06/23/2017     Reason for consult:  syncope            Assessment and Plan:     1. Neurocardiogenic syncope exacerbated by orthostasis and iron deficiency anemia without ongoing symptoms    We discussed the pathophysiology of neurocardiogenic syncope and that she may be sensitive to vasodilators in the future such as alcohol.  We discussed warning signs and symptoms and abortive measures during the prodrome.  Currently no high risk features to suggest malignant etiology.  We did discuss warning signs and symptoms of alternate etiologies and that if she has any abrupt symptoms without positional change, she should notify us for further testing such as Holter/event monitor or repeat echocardiogram.  She is currently on iron supplementation and should keep an eye on this going forward.  Avoiding anemia may help reduce some of her propensity towards neurocardiogenic syncope.    All questions were answered for her and her parents today satisfaction.  It was my pleasure to see this most pleasant patient in consultation today      This note was transcribed using electronic voice recognition software, typographical errors may be present.                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  I have seen her sister in cardiology consultation.  She comes in accompanied by her mother and father today.  She is kindly referred by Dr. Villarreal.    The patient is in college and lives in a dorm.  In April, she got up out  "of bed abruptly in the middle the night to help her friend who was nauseous.  Her friend told her that she might vomit and this made the patient feel very lightheaded.  She felt she needed to sit down and she eventually passed out.  She did not injure herself or have any loss of bladder or bowel continence.   She was not drinking at that time.  She did not have any recent change in her oral contraceptive.  She does have heavy periods and was found to be iron deficient anemic down to 10-11.  She is currently on iron supplementation.    She has not had any recurrent episodes in the past month.  She gets a little lightheaded if she gets up very quickly.  She has not had any syncope without prodrome.  She has not had any presyncope without orthostatic provocation.    I reviewed her electrocardiogram and a past transthoracic echocardiogram from 2011 which was normal.    No exertional chest pain  No PND / orthopnea  No presyncope / syncope  No significant palpitations           Physical Exam:     Vitals: /70 (BP Location: Right arm, Patient Position: Sitting, Cuff Size: Adult Regular)  Pulse 105  Ht 1.626 m (5' 4\")  Wt 62.6 kg (138 lb)  SpO2 99%  BMI 23.69 kg/m2  Constitutional:  cooperative, alert and oriented, well developed, well nourished, in no acute distress        Skin:  warm and dry to the touch, no apparent skin lesions or masses noted        Head:  normocephalic, no masses or lesions        Eyes:  pupils equal and round, conjunctivae and lids unremarkable, sclera white, no xanthalasma, EOMS intact, no nystagmus        ENT:  no pallor or cyanosis, dentition good        Neck:  JVP normal        Chest:  normal breath sounds, clear to auscultation, normal A-P diameter, normal symmetry, normal respiratory excursion, no use of accessory muscles        Cardiac: regular rhythm;no murmurs, gallops or rubs detected             normal respiratory variation for age    Abdomen:      benign    Vascular: pulses full " and equal                                      Extremities and Back:  no deformities, clubbing, cyanosis, erythema observed;no edema        Neurological:  affect appropriate, oriented to time, person and place;no gross motor deficits                    Past Medical History:   I have reviewed this patient's past medical history  History reviewed. No pertinent past medical history.          Past Surgical History:   I have reviewed this patient's past surgical history  History reviewed. No pertinent surgical history.            Social History:   I have reviewed this patient's social history  Social History   Substance Use Topics     Smoking status: Never Smoker     Smokeless tobacco: Not on file     Alcohol use No             Family History:   I have reviewed this patient's family history  Family History   Problem Relation Age of Onset     Neurologic Disorder Other      grandmother, aunts and uncles     Asthma Mother      Hypertension Mother      Asthma Sister      Congenital Anomalies Sister      Hypertension Other      also grandparents and uncle     Lipids Other      grandparents     Family History Negative Father      Lupus Maternal Aunt              Allergies:     Allergies   Allergen Reactions     Bees      Severe swelling at sting site     Hpv Vaccine Recombinant (Yeast Derived) [Quadrivalent Hpv, 6,11,16,18]      Sores in mouth and vagina             Medications:   I have reviewed this patient's current medications  Current Outpatient Prescriptions   Medication Sig Dispense Refill     Ferrous Sulfate (IRON SUPPLEMENT PO)        pantoprazole (PROTONIX) 40 MG EC tablet Take 1 tablet (40 mg) by mouth daily 30 tablet 4     drospirenone-ethinyl estradiol (HAKAN) 3-0.03 MG per tablet Take 1 tablet by mouth daily 84 tablet 3     EPINEPHrine (EPIPEN) 0.3 MG/0.3ML injection Inject 0.3 mLs (0.3 mg) into the muscle as needed for anaphylaxis 0.6 mL 11               Review of Systems:     Review of Systems:  Skin:   Negative     Eyes:  Negative    ENT:  Negative    Respiratory:  Positive for shortness of breath  Cardiovascular:    lightheadedness;dizziness;Positive for;palpitations;chest pain  Gastroenterology: Positive for heartburn;reflux  Genitourinary:  Negative    Musculoskeletal:  Negative    Neurologic:  Positive for numbness or tingling of hands;numbness or tingling of feet  Psychiatric:  Negative    Heme/Lymph/Imm:  Positive for easy bruising  Endocrine:  Negative                       Data:   All laboratory data reviewed  No results found for: CHOL  No results found for: HDL  No results found for: LDL  No results found for: TRIG  No results found for: CHOLHDLRATIO  TSH   Date Value Ref Range Status   05/15/2017 1.51 0.40 - 4.00 mU/L Final     Last Basic Metabolic Panel:  Lab Results   Component Value Date     05/15/2017      Lab Results   Component Value Date    POTASSIUM 4.4 05/15/2017     Lab Results   Component Value Date    CHLORIDE 104 05/15/2017     Lab Results   Component Value Date    FAYE 9.1 05/15/2017     Lab Results   Component Value Date    CO2 27 05/15/2017     Lab Results   Component Value Date    BUN 13 05/15/2017     Lab Results   Component Value Date    CR 0.67 05/15/2017     Lab Results   Component Value Date    GLC 69 05/15/2017     Lab Results   Component Value Date    WBC 8.6 05/15/2017     Lab Results   Component Value Date    RBC 4.41 05/15/2017     Lab Results   Component Value Date    HGB 11.5 05/15/2017     Lab Results   Component Value Date    HCT 36.0 05/15/2017     Lab Results   Component Value Date    MCV 82 05/15/2017     Lab Results   Component Value Date    MCH 26.1 05/15/2017     Lab Results   Component Value Date    MCHC 31.9 05/15/2017     Lab Results   Component Value Date    RDW 13.6 05/15/2017     Lab Results   Component Value Date     05/15/2017     Thank you for allowing me to participate in the care of your patient.    Sincerely,     Lexa Villarreal MD      [facility]

## 2017-06-23 NOTE — PROGRESS NOTES
Cardiology Consultation      Mariposa Nunez MRN# [id number]   YOB: 1995 Age: 22 year old   Date of Visit 06/23/2017     Reason for consult:  syncope            Assessment and Plan:     1. Neurocardiogenic syncope exacerbated by orthostasis and iron deficiency anemia without ongoing symptoms    We discussed the pathophysiology of neurocardiogenic syncope and that she may be sensitive to vasodilators in the future such as alcohol.  We discussed warning signs and symptoms and abortive measures during the prodrome.  Currently no high risk features to suggest malignant etiology.  We did discuss warning signs and symptoms of alternate etiologies and that if she has any abrupt symptoms without positional change, she should notify us for further testing such as Holter/event monitor or repeat echocardiogram.  She is currently on iron supplementation and should keep an eye on this going forward.  Avoiding anemia may help reduce some of her propensity towards neurocardiogenic syncope.    All questions were answered for her and her parents today satisfaction.  It was my pleasure to see this most pleasant patient in consultation today      This note was transcribed using electronic voice recognition software, typographical errors may be present.                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  I have seen her sister in cardiology consultation.  She comes in accompanied by her mother and father today.  She is kindly referred by Dr. Villarreal.    The patient is in college and lives in a dorm.  In April, she got up out of bed abruptly in the middle the night to help her friend who was nauseous.  Her friend told her that she might vomit and this made the patient feel very lightheaded.  She felt she needed to sit down and she eventually passed out.  She did not injure  "herself or have any loss of bladder or bowel continence.   She was not drinking at that time.  She did not have any recent change in her oral contraceptive.  She does have heavy periods and was found to be iron deficient anemic down to 10-11.  She is currently on iron supplementation.    She has not had any recurrent episodes in the past month.  She gets a little lightheaded if she gets up very quickly.  She has not had any syncope without prodrome.  She has not had any presyncope without orthostatic provocation.    I reviewed her electrocardiogram and a past transthoracic echocardiogram from 2011 which was normal.    No exertional chest pain  No PND / orthopnea  No presyncope / syncope  No significant palpitations           Physical Exam:     Vitals: /70 (BP Location: Right arm, Patient Position: Sitting, Cuff Size: Adult Regular)  Pulse 105  Ht 1.626 m (5' 4\")  Wt 62.6 kg (138 lb)  SpO2 99%  BMI 23.69 kg/m2  Constitutional:  cooperative, alert and oriented, well developed, well nourished, in no acute distress        Skin:  warm and dry to the touch, no apparent skin lesions or masses noted        Head:  normocephalic, no masses or lesions        Eyes:  pupils equal and round, conjunctivae and lids unremarkable, sclera white, no xanthalasma, EOMS intact, no nystagmus        ENT:  no pallor or cyanosis, dentition good        Neck:  JVP normal        Chest:  normal breath sounds, clear to auscultation, normal A-P diameter, normal symmetry, normal respiratory excursion, no use of accessory muscles        Cardiac: regular rhythm;no murmurs, gallops or rubs detected             normal respiratory variation for age    Abdomen:      benign    Vascular: pulses full and equal                                      Extremities and Back:  no deformities, clubbing, cyanosis, erythema observed;no edema        Neurological:  affect appropriate, oriented to time, person and place;no gross motor deficits                   "  Past Medical History:   I have reviewed this patient's past medical history  History reviewed. No pertinent past medical history.          Past Surgical History:   I have reviewed this patient's past surgical history  History reviewed. No pertinent surgical history.            Social History:   I have reviewed this patient's social history  Social History   Substance Use Topics     Smoking status: Never Smoker     Smokeless tobacco: Not on file     Alcohol use No             Family History:   I have reviewed this patient's family history  Family History   Problem Relation Age of Onset     Neurologic Disorder Other      grandmother, aunts and uncles     Asthma Mother      Hypertension Mother      Asthma Sister      Congenital Anomalies Sister      Hypertension Other      also grandparents and uncle     Lipids Other      grandparents     Family History Negative Father      Lupus Maternal Aunt              Allergies:     Allergies   Allergen Reactions     Bees      Severe swelling at sting site     Hpv Vaccine Recombinant (Yeast Derived) [Quadrivalent Hpv, 6,11,16,18]      Sores in mouth and vagina             Medications:   I have reviewed this patient's current medications  Current Outpatient Prescriptions   Medication Sig Dispense Refill     Ferrous Sulfate (IRON SUPPLEMENT PO)        pantoprazole (PROTONIX) 40 MG EC tablet Take 1 tablet (40 mg) by mouth daily 30 tablet 4     drospirenone-ethinyl estradiol (HAKAN) 3-0.03 MG per tablet Take 1 tablet by mouth daily 84 tablet 3     EPINEPHrine (EPIPEN) 0.3 MG/0.3ML injection Inject 0.3 mLs (0.3 mg) into the muscle as needed for anaphylaxis 0.6 mL 11               Review of Systems:     Review of Systems:  Skin:  Negative     Eyes:  Negative    ENT:  Negative    Respiratory:  Positive for shortness of breath  Cardiovascular:    lightheadedness;dizziness;Positive for;palpitations;chest pain  Gastroenterology: Positive for heartburn;reflux  Genitourinary:  Negative     Musculoskeletal:  Negative    Neurologic:  Positive for numbness or tingling of hands;numbness or tingling of feet  Psychiatric:  Negative    Heme/Lymph/Imm:  Positive for easy bruising  Endocrine:  Negative                       Data:   All laboratory data reviewed  No results found for: CHOL  No results found for: HDL  No results found for: LDL  No results found for: TRIG  No results found for: CHOLHDLRATIO  TSH   Date Value Ref Range Status   05/15/2017 1.51 0.40 - 4.00 mU/L Final     Last Basic Metabolic Panel:  Lab Results   Component Value Date     05/15/2017      Lab Results   Component Value Date    POTASSIUM 4.4 05/15/2017     Lab Results   Component Value Date    CHLORIDE 104 05/15/2017     Lab Results   Component Value Date    FAYE 9.1 05/15/2017     Lab Results   Component Value Date    CO2 27 05/15/2017     Lab Results   Component Value Date    BUN 13 05/15/2017     Lab Results   Component Value Date    CR 0.67 05/15/2017     Lab Results   Component Value Date    GLC 69 05/15/2017     Lab Results   Component Value Date    WBC 8.6 05/15/2017     Lab Results   Component Value Date    RBC 4.41 05/15/2017     Lab Results   Component Value Date    HGB 11.5 05/15/2017     Lab Results   Component Value Date    HCT 36.0 05/15/2017     Lab Results   Component Value Date    MCV 82 05/15/2017     Lab Results   Component Value Date    MCH 26.1 05/15/2017     Lab Results   Component Value Date    MCHC 31.9 05/15/2017     Lab Results   Component Value Date    RDW 13.6 05/15/2017     Lab Results   Component Value Date     05/15/2017

## 2017-06-23 NOTE — MR AVS SNAPSHOT
"              After Visit Summary   6/23/2017    Mariposa Nunez    MRN: [id number]           Patient Information     Date Of Birth          1995        Visit Information        Provider Department      6/23/2017 9:15 AM Lexa Villarreal MD [facility] [place]        Today's Diagnoses     Neurocardiogenic syncope    -  1       Follow-ups after your visit        Who to contact     If you have questions or need follow up information about today's clinic visit or your schedule please contact [facility] directly at [phone].  Normal or non-critical lab and imaging results will be communicated to you by The Global Instructor Networkhart, letter or phone within 4 business days after the clinic has received the results. If you do not hear from us within 7 days, please contact the clinic through Gozentt or phone. If you have a critical or abnormal lab result, we will notify you by phone as soon as possible.  Submit refill requests through Singspiel or call your pharmacy and they will forward the refill request to us. Please allow 3 business days for your refill to be completed.          Additional Information About Your Visit        MyChart Information     Singspiel gives you secure access to your electronic health record. If you see a primary care provider, you can also send messages to your care team and make appointments. If you have questions, please call your primary care clinic.  If you do not have a primary care provider, please call [phone] and they will assist you.        Care EveryWhere ID     This is your Care EveryWhere ID. This could be used by other organizations to access your [place] medical records  [id number]        Your Vitals Were     Pulse Height Pulse Oximetry BMI (Body Mass Index)          105 1.626 m (5' 4\") 99% 23.69 kg/m2         Blood Pressure from Last 3 Encounters:   06/23/17 102/70   05/15/17 110/68   03/22/17 120/62    Weight " from Last 3 Encounters:   06/23/17 62.6 kg (138 lb)   05/15/17 62.6 kg (138 lb 1.6 oz)   03/22/17 63.2 kg (139 lb 6.4 oz)              Today, you had the following     No orders found for display       Primary Care Provider Office Phone # Fax #    Nadine Villarreal [phone]       [facility] [address] [place] [address] [address] [place] [address]        Equal Access to Services     REJI MCPHERSON : Hadii aad ku hadasho Soomaali, waaxda luqadaha, qaybta kaalmada adeegyada, waxay idiin hayaan adeeg khararosa lakyra . So [facility] [place] [phone].    ATENCIÓN: Si habla español, tiene a acosta disposición servicios gratuitos de asistencia lingüística. [facility] [phone].    We comply with applicable federal civil rights laws and [place] laws. We do not discriminate on the basis of race, color, national origin, age, disability sex, sexual orientation or gender identity.            Thank you!     Thank you for choosing [facility] [place]  for your care. Our goal is always to provide you with excellent care. Hearing back from our patients is one way we can continue to improve our services. Please take a few minutes to complete the written survey that you may receive in the mail after your visit with us. Thank you!             Your Updated Medication List - Protect others around you: Learn how to safely use, store and throw away your medicines at www.disposemymeds.org.          This list is accurate as of: 6/23/17  9:37 AM.  Always use your most recent med list.                   Brand Name Dispense Instructions for use Diagnosis    drospirenone-ethinyl estradiol 3-0.03 MG per tablet    HAKAN    84 tablet    Take 1 tablet by mouth daily    PMDD (premenstrual dysphoric disorder)       EPINEPHrine 0.3 MG/0.3ML injection     0.6 mL    Inject 0.3 mLs (0.3 mg) into the muscle as needed for anaphylaxis    History of bee sting allergy       IRON SUPPLEMENT PO           pantoprazole 40  MG EC tablet    PROTONIX    30 tablet    Take 1 tablet (40 mg) by mouth daily    ADHD (attention deficit hyperactivity disorder), inattentive type

## 2017-07-29 ENCOUNTER — HEALTH MAINTENANCE LETTER (OUTPATIENT)
Age: 22
End: 2017-07-29

## 2017-08-21 ENCOUNTER — OFFICE VISIT (OUTPATIENT)
Dept: PEDIATRICS | Facility: CLINIC | Age: 22
End: 2017-08-21
Payer: COMMERCIAL

## 2017-08-21 VITALS
TEMPERATURE: 98.2 F | HEIGHT: 64 IN | BODY MASS INDEX: 23.61 KG/M2 | HEART RATE: 86 BPM | DIASTOLIC BLOOD PRESSURE: 62 MMHG | SYSTOLIC BLOOD PRESSURE: 106 MMHG | WEIGHT: 138.3 LBS

## 2017-08-21 DIAGNOSIS — F90.0 ADHD (ATTENTION DEFICIT HYPERACTIVITY DISORDER), INATTENTIVE TYPE: ICD-10-CM

## 2017-08-21 DIAGNOSIS — D50.9 IRON DEFICIENCY ANEMIA, UNSPECIFIED IRON DEFICIENCY ANEMIA TYPE: Primary | ICD-10-CM

## 2017-08-21 LAB
ERYTHROCYTE [DISTWIDTH] IN BLOOD BY AUTOMATED COUNT: 13.5 % (ref 10–15)
HCT VFR BLD AUTO: 37.8 % (ref 35–47)
HGB BLD-MCNC: 12.3 G/DL (ref 11.7–15.7)
MCH RBC QN AUTO: 26.8 PG (ref 26.5–33)
MCHC RBC AUTO-ENTMCNC: 32.5 G/DL (ref 31.5–36.5)
MCV RBC AUTO: 82 FL (ref 78–100)
PLATELET # BLD AUTO: 386 10E9/L (ref 150–450)
RBC # BLD AUTO: 4.59 10E12/L (ref 3.8–5.2)
WBC # BLD AUTO: 8.3 10E9/L (ref 4–11)

## 2017-08-21 PROCEDURE — 99213 OFFICE O/P EST LOW 20 MIN: CPT | Performed by: INTERNAL MEDICINE

## 2017-08-21 PROCEDURE — 83550 IRON BINDING TEST: CPT | Performed by: INTERNAL MEDICINE

## 2017-08-21 PROCEDURE — 85027 COMPLETE CBC AUTOMATED: CPT | Performed by: INTERNAL MEDICINE

## 2017-08-21 PROCEDURE — 36415 COLL VENOUS BLD VENIPUNCTURE: CPT | Performed by: INTERNAL MEDICINE

## 2017-08-21 PROCEDURE — 83540 ASSAY OF IRON: CPT | Performed by: INTERNAL MEDICINE

## 2017-08-21 RX ORDER — METHYLPHENIDATE HYDROCHLORIDE 5 MG/1
5 TABLET ORAL 2 TIMES DAILY
Qty: 60 TABLET | Refills: 0 | Status: SHIPPED | OUTPATIENT
Start: 2017-08-21 | End: 2018-08-20 | Stop reason: DRUGHIGH

## 2017-08-21 NOTE — NURSING NOTE
"Chief Complaint   Patient presents with     Attention Deficit Disorder     discuss       Initial /62 (Cuff Size: Adult Regular)  Pulse 86  Temp 98.2  F (36.8  C) (Oral)  Ht 5' 3.78\" (1.62 m)  Wt 138 lb 4.8 oz (62.7 kg)  BMI 23.9 kg/m2 Estimated body mass index is 23.9 kg/(m^2) as calculated from the following:    Height as of this encounter: 5' 3.78\" (1.62 m).    Weight as of this encounter: 138 lb 4.8 oz (62.7 kg).  Medication Reconciliation: complete   Randa Rodríguez LPN    "

## 2017-08-21 NOTE — MR AVS SNAPSHOT
After Visit Summary   8/21/2017    Mariposa Nunez    MRN: [id number]           Patient Information     Date Of Birth          1995        Visit Information        Provider Department      8/21/2017 2:10 PM [facility]        Today's Diagnoses     Iron deficiency anemia, unspecified iron deficiency anemia type    -  1    ADHD (attention deficit hyperactivity disorder), inattentive type          Care Instructions    Come in for a nurse visit when you are home over fall break to check blood pressure and pulse. Then we can meet when you are home at [facility].  We can do a phone visit ir e-visit if we need to change medication while you are at school or if you are having any issues.    I'll let you know how your iron levels look. If things look good, we can recheck your blood tests when you are home in the fall.          Follow-ups after your visit        Who to contact     If you have questions or need follow up information about today's clinic visit or your schedule please contact [facility] directly at [phone].  Normal or non-critical lab and imaging results will be communicated to you by Platialhart, letter or phone within 4 business days after the clinic has received the results. If you do not hear from us within 7 days, please contact the clinic through Ternt or phone. If you have a critical or abnormal lab result, we will notify you by phone as soon as possible.  Submit refill requests through Six Month Smiles or call your pharmacy and they will forward the refill request to us. Please allow 3 business days for your refill to be completed.          Additional Information About Your Visit        PlatialharNevis Networks Information     Six Month Smiles gives you secure access to your electronic health record. If you see a primary care provider, you can also send messages to your care team and make appointments. If you have questions, please call your primary care clinic.  If you  "do not have a primary care provider, please call [phone] and they will assist you.        Care EveryWhere ID     This is your Care EveryWhere ID. This could be used by other organizations to access your [facility] medical records  [id number]        Your Vitals Were     Pulse Temperature Height BMI (Body Mass Index)          86 98.2  F (36.8  C) (Oral) 5' 3.78\" (1.62 m) 23.9 kg/m2         Blood Pressure from Last 3 Encounters:   08/21/17 106/62   06/23/17 102/70   05/15/17 110/68    Weight from Last 3 Encounters:   08/21/17 138 lb 4.8 oz (62.7 kg)   06/23/17 138 lb (62.6 kg)   05/15/17 138 lb 1.6 oz (62.6 kg)              We Performed the Following     CBC with platelets     Iron and iron binding capacity          Today's Medication Changes          These changes are accurate as of: 8/21/17  3:04 PM.  If you have any questions, ask your nurse or doctor.               Start taking these medicines.        Dose/Directions    methylphenidate 5 MG tablet   Commonly known as:  RITALIN   Used for:  ADHD (attention deficit hyperactivity disorder), inattentive type   Started by:  Nadine Villarreal MD        Dose:  5 mg   Take 1 tablet (5 mg) by mouth 2 times daily   Quantity:  60 tablet   Refills:  0            Where to get your medicines      Some of these will need a paper prescription and others can be bought over the counter.  Ask your nurse if you have questions.     Bring a paper prescription for each of these medications     methylphenidate 5 MG tablet                Primary Care Provider Office Phone # Fax #    Nadine Villarreal [phone] [address] [place] [address]        Equal Access to Services     [facility] AH: Hadii jeanna Carter, wahumada luqadaha, qaybta kaalmada abi bryant. So [facility] [phone].    ATENCIÓN: Si habla español, tiene a acosta disposición servicios gratuitos de asistencia lingüística. Llame al " [phone].    We comply with applicable federal civil rights laws and [place] laws. We do not discriminate on the basis of race, color, national origin, age, disability sex, sexual orientation or gender identity.            Thank you!     Thank you for choosing [facility]  for your care. Our goal is always to provide you with excellent care. Hearing back from our patients is one way we can continue to improve our services. Please take a few minutes to complete the written survey that you may receive in the mail after your visit with us. Thank you!             Your Updated Medication List - Protect others around you: Learn how to safely use, store and throw away your medicines at www.disposemymeds.org.          This list is accurate as of: 8/21/17  3:04 PM.  Always use your most recent med list.                   Brand Name Dispense Instructions for use Diagnosis    drospirenone-ethinyl estradiol 3-0.03 MG per tablet    HAKAN    84 tablet    Take 1 tablet by mouth daily    PMDD (premenstrual dysphoric disorder)       EPINEPHrine 0.3 MG/0.3ML injection 2-pack    EPIPEN/ADRENACLICK/or ANY BX GENERIC EQUIV    0.6 mL    Inject 0.3 mLs (0.3 mg) into the muscle as needed for anaphylaxis    History of bee sting allergy       methylphenidate 5 MG tablet    RITALIN    60 tablet    Take 1 tablet (5 mg) by mouth 2 times daily    ADHD (attention deficit hyperactivity disorder), inattentive type       pantoprazole 40 MG EC tablet    PROTONIX    30 tablet    Take 1 tablet (40 mg) by mouth daily    ADHD (attention deficit hyperactivity disorder), inattentive type

## 2017-08-21 NOTE — PATIENT INSTRUCTIONS
Come in for a nurse visit when you are home over fall break to check blood pressure and pulse. Then we can meet when you are home at [facility].  We can do a phone visit ir e-visit if we need to change medication while you are at school or if you are having any issues.    I'll let you know how your iron levels look. If things look good, we can recheck your blood tests when you are home in the fall.

## 2017-08-22 LAB
IRON SATN MFR SERPL: 7 % (ref 15–46)
IRON SERPL-MCNC: 32 UG/DL (ref 35–180)
TIBC SERPL-MCNC: 472 UG/DL (ref 240–430)

## 2017-11-22 ENCOUNTER — OFFICE VISIT (OUTPATIENT)
Dept: PEDIATRICS | Facility: CLINIC | Age: 22
End: 2017-11-22
Payer: COMMERCIAL

## 2017-11-22 VITALS
TEMPERATURE: 98.1 F | HEART RATE: 82 BPM | SYSTOLIC BLOOD PRESSURE: 106 MMHG | OXYGEN SATURATION: 98 % | DIASTOLIC BLOOD PRESSURE: 62 MMHG

## 2017-11-22 DIAGNOSIS — F32.81 PMDD (PREMENSTRUAL DYSPHORIC DISORDER): ICD-10-CM

## 2017-11-22 DIAGNOSIS — F90.0 ADHD (ATTENTION DEFICIT HYPERACTIVITY DISORDER), INATTENTIVE TYPE: Primary | ICD-10-CM

## 2017-11-22 DIAGNOSIS — Z11.3 SCREENING EXAMINATION FOR VENEREAL DISEASE: ICD-10-CM

## 2017-11-22 DIAGNOSIS — E61.1 IRON DEFICIENCY: ICD-10-CM

## 2017-11-22 DIAGNOSIS — R55 SYNCOPE, UNSPECIFIED SYNCOPE TYPE: ICD-10-CM

## 2017-11-22 DIAGNOSIS — Z91.030 HISTORY OF BEE STING ALLERGY: ICD-10-CM

## 2017-11-22 LAB
ALBUMIN UR-MCNC: NEGATIVE MG/DL
APPEARANCE UR: CLEAR
BILIRUB UR QL STRIP: NEGATIVE
C TRACH DNA SPEC QL NAA+PROBE: ABNORMAL
COLOR UR AUTO: YELLOW
GLUCOSE UR STRIP-MCNC: NEGATIVE MG/DL
HGB BLD-MCNC: 11 G/DL (ref 11.7–15.7)
HGB UR QL STRIP: NEGATIVE
KETONES UR STRIP-MCNC: NEGATIVE MG/DL
LEUKOCYTE ESTERASE UR QL STRIP: NEGATIVE
N GONORRHOEA DNA SPEC QL NAA+PROBE: ABNORMAL
NITRATE UR QL: NEGATIVE
PH UR STRIP: 7 PH (ref 5–7)
SOURCE: NORMAL
SP GR UR STRIP: 1.02 (ref 1–1.03)
SPECIMEN SOURCE: ABNORMAL
SPECIMEN SOURCE: ABNORMAL
UROBILINOGEN UR STRIP-ACNC: 0.2 EU/DL (ref 0.2–1)

## 2017-11-22 PROCEDURE — 99214 OFFICE O/P EST MOD 30 MIN: CPT | Performed by: INTERNAL MEDICINE

## 2017-11-22 PROCEDURE — 36415 COLL VENOUS BLD VENIPUNCTURE: CPT | Performed by: INTERNAL MEDICINE

## 2017-11-22 PROCEDURE — 83540 ASSAY OF IRON: CPT | Performed by: INTERNAL MEDICINE

## 2017-11-22 PROCEDURE — 87491 CHLMYD TRACH DNA AMP PROBE: CPT | Performed by: INTERNAL MEDICINE

## 2017-11-22 PROCEDURE — 85018 HEMOGLOBIN: CPT | Performed by: INTERNAL MEDICINE

## 2017-11-22 PROCEDURE — 83550 IRON BINDING TEST: CPT | Performed by: INTERNAL MEDICINE

## 2017-11-22 PROCEDURE — 87591 N.GONORRHOEAE DNA AMP PROB: CPT | Performed by: INTERNAL MEDICINE

## 2017-11-22 PROCEDURE — 81003 URINALYSIS AUTO W/O SCOPE: CPT | Performed by: INTERNAL MEDICINE

## 2017-11-22 RX ORDER — DROSPIRENONE AND ETHINYL ESTRADIOL 0.03MG-3MG
1 KIT ORAL DAILY
Qty: 84 TABLET | Refills: 3 | Status: SHIPPED | OUTPATIENT
Start: 2017-11-22 | End: 2018-10-20

## 2017-11-22 RX ORDER — EPINEPHRINE 0.3 MG/.3ML
0.3 INJECTION SUBCUTANEOUS PRN
Qty: 0.6 ML | Refills: 11 | Status: SHIPPED | OUTPATIENT
Start: 2017-11-22 | End: 2018-08-20

## 2017-11-22 NOTE — MR AVS SNAPSHOT
After Visit Summary   11/22/2017    Mariposa Nunez    MRN: [id number]           Patient Information     Date Of Birth          1995        Visit Information        Provider Department      11/22/2017 9:30 AM [facility]        Today's Diagnoses     Screening examination for venereal disease    -  1    PMDD (premenstrual dysphoric disorder)        History of bee sting allergy        Iron deficiency          Care Instructions    Let's keep the ritalin prescription the same for now.  If you need a second dose, try waiting at least 6 hours between doses to minimize side effects, jitteriness, or consider taking just 1/2 a tablet at 4 hours.  We could consider trying a different ritalin form.  Another option is doing GeneSight testing to see which medications are more likely to work for you.     Let's meet in the summer for a physical.    I'll let you know how your blood tests look. If iron levels OK, we can stop checking.          Follow-ups after your visit        Who to contact     If you have questions or need follow up information about today's clinic visit or your schedule please contact [facility] directly at [phone].  Normal or non-critical lab and imaging results will be communicated to you by iPolicy Networkshart, letter or phone within 4 business days after the clinic has received the results. If you do not hear from us within 7 days, please contact the clinic through EZ-Appst or phone. If you have a critical or abnormal lab result, we will notify you by phone as soon as possible.  Submit refill requests through Son[employer] or call your pharmacy and they will forward the refill request to us. Please allow 3 business days for your refill to be completed.          Additional Information About Your Visit        MyChart Information     [employer] gives you secure access to your electronic health record. If you see a primary care provider, you can also send messages  to your care team and make appointments. If you have questions, please call your primary care clinic.  If you do not have a primary care provider, please call [phone] and they will assist you.        Care EveryWhere ID     This is your Care EveryWhere ID. This could be used by other organizations to access your [facility] medical records  [id number]        Your Vitals Were     Pulse Temperature Pulse Oximetry             82 98.1  F (36.7  C) (Oral) 98%          Blood Pressure from Last 3 Encounters:   11/22/17 106/62   08/21/17 106/62   06/23/17 102/70    Weight from Last 3 Encounters:   08/21/17 138 lb 4.8 oz (62.7 kg)   06/23/17 138 lb (62.6 kg)   05/15/17 138 lb 1.6 oz (62.6 kg)              We Performed the Following     CHLAMYDIA TRACHOMATIS PCR     Hemoglobin     Iron and iron binding capacity     NEISSERIA GONORRHOEA PCR     UA reflex to Microscopic and Culture          Where to get your medicines      These medications were sent to [email or web address] [address] [address] AT [address]  [address] [facility] [address] [address]     Phone:  [phone]     drospirenone-ethinyl estradiol 3-0.03 MG per tablet         Some of these will need a paper prescription and others can be bought over the counter.  Ask your nurse if you have questions.     Bring a paper prescription for each of these medications     EPINEPHrine 0.3 MG/0.3ML injection 2-pack          Primary Care Provider Office Phone # Fax #    Nadine Villarreal [phone]       [facility] [address] [place] [address]        Equal Access to Services     [facility] AH: Hadii jeanna Carter, wahumada luqadaha, qaybta kaalmaabi santana. So [facility] [phone].    ATENCIÓN: Si habla español, tiene a acosta disposición servicios gratuitos de asistencia lingüística. Kelsey urrutia [phone].    We comply with applicable federal civil rights laws and  [place] laws. We do not discriminate on the basis of race, color, national origin, age, disability, sex, sexual orientation, or gender identity.            Thank you!     Thank you for choosing [facility]  for your care. Our goal is always to provide you with excellent care. Hearing back from our patients is one way we can continue to improve our services. Please take a few minutes to complete the written survey that you may receive in the mail after your visit with us. Thank you!             Your Updated Medication List - Protect others around you: Learn how to safely use, store and throw away your medicines at www.disposemymeds.org.          This list is accurate as of: 11/22/17 10:41 AM.  Always use your most recent med list.                   Brand Name Dispense Instructions for use Diagnosis    drospirenone-ethinyl estradiol 3-0.03 MG per tablet    HAKAN    84 tablet    Take 1 tablet by mouth daily    PMDD (premenstrual dysphoric disorder)       EPINEPHrine 0.3 MG/0.3ML injection 2-pack    EPIPEN/ADRENACLICK/or ANY BX GENERIC EQUIV    0.6 mL    Inject 0.3 mLs (0.3 mg) into the muscle as needed for anaphylaxis    History of bee sting allergy       methylphenidate 5 MG tablet    RITALIN    60 tablet    Take 1 tablet (5 mg) by mouth 2 times daily    ADHD (attention deficit hyperactivity disorder), inattentive type       pantoprazole 40 MG EC tablet    PROTONIX    30 tablet    Take 1 tablet (40 mg) by mouth daily    ADHD (attention deficit hyperactivity disorder), inattentive type

## 2017-11-22 NOTE — PROGRESS NOTES
SUBJECTIVE:   Mariposa Nunez is a 22 year old female who presents to clinic today for the following health issues:      Medication Followup     Taking Medication as prescribed: yes    Side Effects:  None    Medication Helping Symptoms:  Only takes when she needs it     Only takes ritalin as needed. One day when she took it twice, the second dose felt more strong. Reports she took her second dose on an empty stomach, and it was only 4 hours after her first dose. Reports she felt the jittery symptoms before she ate 30 minutes later. Didn't do well on adderall.    Would like to review her iron deficiency. Not taking iron replacement at this time.    No further episodes of syncope. Reviewed her visit notes with cardiology. Dr. Villarreal recommended staying hydrated and working on her iron deficiency anemia.    Problem list and histories reviewed & adjusted, as indicated.  Additional history: as documented    Patient Active Problem List   Diagnosis     Anxiety state     History of bee sting allergy     ADHD (attention deficit hyperactivity disorder), inattentive type     Syncope, unspecified syncope type     History reviewed. No pertinent surgical history.    Social History   Substance Use Topics     Smoking status: Never Smoker     Smokeless tobacco: Never Used     Alcohol use No     Family History   Problem Relation Age of Onset     Neurologic Disorder Other      grandmother, aunts and uncles     Asthma Mother      Hypertension Mother      Asthma Sister      Congenital Anomalies Sister      Hypertension Other      also grandparents and uncle     Lipids Other      grandparents     Family History Negative Father      Lupus Maternal Aunt          Current Outpatient Prescriptions   Medication Sig Dispense Refill     drospirenone-ethinyl estradiol (HAKAN) 3-0.03 MG per tablet Take 1 tablet by mouth daily 84 tablet 3     EPINEPHrine (EPIPEN/ADRENACLICK/OR ANY BX GENERIC EQUIV) 0.3 MG/0.3ML injection 2-pack Inject 0.3 mLs (0.3  mg) into the muscle as needed for anaphylaxis 0.6 mL 11     methylphenidate (RITALIN) 5 MG tablet Take 1 tablet (5 mg) by mouth 2 times daily 60 tablet 0     pantoprazole (PROTONIX) 40 MG EC tablet Take 1 tablet (40 mg) by mouth daily 30 tablet 4         Reviewed and updated as needed this visit by clinical staff     Reviewed and updated as needed this visit by Provider         ROS:  Constitutional, HEENT, cardiovascular, pulmonary, GI, , musculoskeletal, neuro, skin, endocrine and psych systems are negative, except as otherwise noted.      OBJECTIVE:   /62 (Cuff Size: Adult Regular)  Pulse 82  Temp 98.1  F (36.7  C) (Oral)  SpO2 98%  There is no height or weight on file to calculate BMI.  GENERAL: healthy, alert and no distress  NECK: no adenopathy, no asymmetry, masses, or scars and thyroid normal to palpation  RESP: lungs clear to auscultation - no rales, rhonchi or wheezes  CV: regular rate and rhythm, normal S1 S2, no S3 or S4, no murmur, click or rub, no peripheral edema and peripheral pulses strong  ABDOMEN: soft, nontender, no hepatosplenomegaly, no masses and bowel sounds normal  MS: no gross musculoskeletal defects noted, no edema    Diagnostic Test Results:  Results for orders placed or performed in visit on 11/22/17   UA reflex to Microscopic and Culture   Result Value Ref Range    Color Urine Yellow     Appearance Urine Clear     Glucose Urine Negative NEG^Negative mg/dL    Bilirubin Urine Negative NEG^Negative    Ketones Urine Negative NEG^Negative mg/dL    Specific Gravity Urine 1.020 1.003 - 1.035    Blood Urine Negative NEG^Negative    pH Urine 7.0 5.0 - 7.0 pH    Protein Albumin Urine Negative NEG^Negative mg/dL    Urobilinogen Urine 0.2 0.2 - 1.0 EU/dL    Nitrite Urine Negative NEG^Negative    Leukocyte Esterase Urine Negative NEG^Negative    Source Midstream Urine    Hemoglobin   Result Value Ref Range    Hemoglobin 11.0 (L) 11.7 - 15.7 g/dL   Iron and iron binding capacity   Result  Value Ref Range    Iron 27 (L) 35 - 180 ug/dL    Iron Binding Cap 444 (H) 240 - 430 ug/dL    Iron Saturation Index 6 (L) 15 - 46 %   NEISSERIA GONORRHOEA PCR   Result Value Ref Range    Specimen Descrip Canceled, Test credited     N Gonorrhea PCR Canceled, Test credited (A) NEG^Negative   CHLAMYDIA TRACHOMATIS PCR   Result Value Ref Range    Specimen Description Canceled, Test credited     Chlamydia Trachomatis PCR Canceled, Test credited (A) NEG^Negative       ASSESSMENT/PLAN:     1. ADHD (attention deficit hyperactivity disorder), inattentive type  Stable on ritalin. Discussed option to try longer acting medication, but she reports she wants to be able to take the medication only when she needs it. Discussed option to wait longer between doses. Also discussed option to do GeneSight testing since she didn't do well on adderall.    2. Iron deficiency  Repeat labs today.   - UA reflex to Microscopic and Culture  - Hemoglobin  - Iron and iron binding capacity    3. Syncope, unspecified syncope type  No further symptoms. No need for further evaluation at this time.    4. History of bee sting allergy  refilled  - EPINEPHrine (EPIPEN/ADRENACLICK/OR ANY BX GENERIC EQUIV) 0.3 MG/0.3ML injection 2-pack; Inject 0.3 mLs (0.3 mg) into the muscle as needed for anaphylaxis  Dispense: 0.6 mL; Refill: 11    5. PMDD (premenstrual dysphoric disorder)  refilled  - drospirenone-ethinyl estradiol (HAKAN) 3-0.03 MG per tablet; Take 1 tablet by mouth daily  Dispense: 84 tablet; Refill: 3    6. Screening examination for venereal disease    - NEISSERIA GONORRHOEA PCR  - CHLAMYDIA TRACHOMATIS PCR    Patient Instructions   Let's keep the ritalin prescription the same for now.  If you need a second dose, try waiting at least 6 hours between doses to minimize side effects, jitteriness, or consider taking just 1/2 a tablet at 4 hours.  We could consider trying a different ritalin form.  Another option is doing GeneSight testing to see which  medications are more likely to work for you.     Let's meet in the summer for a physical.    I'll let you know how your blood tests look. If iron levels OK, we can stop checking.      Nadine Ayon MD  [facility]

## 2017-11-22 NOTE — PATIENT INSTRUCTIONS
Let's keep the ritalin prescription the same for now.  If you need a second dose, try waiting at least 6 hours between doses to minimize side effects, jitteriness, or consider taking just 1/2 a tablet at 4 hours.  We could consider trying a different ritalin form.  Another option is doing GeneSight testing to see which medications are more likely to work for you.     Let's meet in the summer for a physical.    I'll let you know how your blood tests look. If iron levels OK, we can stop checking.

## 2017-11-22 NOTE — NURSING NOTE
"Chief Complaint   Patient presents with     Recheck Medication       Initial /62 (Cuff Size: Adult Regular)  Pulse 82  Temp 98.1  F (36.7  C) (Oral)  SpO2 98% Estimated body mass index is 23.9 kg/(m^2) as calculated from the following:    Height as of 8/21/17: 5' 3.78\" (1.62 m).    Weight as of 8/21/17: 138 lb 4.8 oz (62.7 kg).  Medication Reconciliation: complete   Randa Rodríguez LPN    "

## 2017-11-23 LAB
IRON SATN MFR SERPL: 6 % (ref 15–46)
IRON SERPL-MCNC: 27 UG/DL (ref 35–180)
TIBC SERPL-MCNC: 444 UG/DL (ref 240–430)

## 2017-12-20 ENCOUNTER — OFFICE VISIT (OUTPATIENT)
Dept: PEDIATRICS | Facility: CLINIC | Age: 22
End: 2017-12-20
Payer: COMMERCIAL

## 2017-12-20 VITALS
HEIGHT: 64 IN | OXYGEN SATURATION: 99 % | HEART RATE: 94 BPM | TEMPERATURE: 98.4 F | WEIGHT: 139.1 LBS | BODY MASS INDEX: 23.75 KG/M2 | DIASTOLIC BLOOD PRESSURE: 70 MMHG | SYSTOLIC BLOOD PRESSURE: 118 MMHG

## 2017-12-20 DIAGNOSIS — N89.8 VAGINAL ITCHING: Primary | ICD-10-CM

## 2017-12-20 PROCEDURE — 99213 OFFICE O/P EST LOW 20 MIN: CPT | Mod: GE | Performed by: INTERNAL MEDICINE

## 2017-12-20 RX ORDER — FLUCONAZOLE 150 MG/1
150 TABLET ORAL ONCE
Qty: 1 TABLET | Refills: 0 | Status: SHIPPED | OUTPATIENT
Start: 2017-12-20 | End: 2017-12-20

## 2017-12-20 NOTE — MR AVS SNAPSHOT
After Visit Summary   12/20/2017    Mariposa Nunez    MRN: [id number]           Patient Information     Date Of Birth          1995        Visit Information        Provider Department      12/20/2017 11:00 AM Magaly Torrez MD [facility]        Today's Diagnoses     Vaginal itching    -  1      Care Instructions    The swab was negative for yeast but we could try treating for yeast just in case. In the meantime, please avoid soaps/lotions in the area. Only wash with water.     It was a pleasure to see you today!    Dr. Magaly Torrez            Follow-ups after your visit        Who to contact     If you have questions or need follow up information about today's clinic visit or your schedule please contact [facility] directly at [phone].  Normal or non-critical lab and imaging results will be communicated to you by MyChart, letter or phone within 4 business days after the clinic has received the results. If you do not hear from us within 7 days, please contact the clinic through MyChart or phone. If you have a critical or abnormal lab result, we will notify you by phone as soon as possible.  Submit refill requests through [employer] or call your pharmacy and they will forward the refill request to us. Please allow 3 business days for your refill to be completed.          Additional Information About Your Visit        MyChart Information     [employer] gives you secure access to your electronic health record. If you see a primary care provider, you can also send messages to your care team and make appointments. If you have questions, please call your primary care clinic.  If you do not have a primary care provider, please call [phone] and they will assist you.        Care EveryWhere ID     This is your Care EveryWhere ID. This could be used by other organizations to access your [place] medical records  [id number]        Your Vitals Were     Pulse Temperature  "Height Pulse Oximetry BMI (Body Mass Index)       94 98.4  F (36.9  C) (Tympanic) 5' 3.75\" (1.619 m) 99% 24.06 kg/m2        Blood Pressure from Last 3 Encounters:   12/20/17 118/70   11/22/17 106/62   08/21/17 106/62    Weight from Last 3 Encounters:   12/20/17 139 lb 1.6 oz (63.1 kg)   08/21/17 138 lb 4.8 oz (62.7 kg)   06/23/17 138 lb (62.6 kg)              We Performed the Following     Wet prep        Primary Care Provider Office Phone # Fax #    Nadine SRINATH MD [place] [phone]       [facility] [place] [address]        Equal Access to Services     [facility] [place] : Lilibeth sweeney Soshruti, waaxda luqadaha, qaybta kaalmada adeegyada, abi brantley . So [facility] [phone].    ATENCIÓN: Si habla español, tiene a acosta disposición servicios gratuitos de asistencia lingüística. [facility] [phone].    We comply with applicable federal civil rights laws and [place] laws. We do not discriminate on the basis of race, color, national origin, age, disability, sex, sexual orientation, or gender identity.            Thank you!     Thank you for choosing [facility]  for your care. Our goal is always to provide you with excellent care. Hearing back from our patients is one way we can continue to improve our services. Please take a few minutes to complete the written survey that you may receive in the mail after your visit with us. Thank you!             Your Updated Medication List - Protect others around you: Learn how to safely use, store and throw away your medicines at www.disposemymeds.org.          This list is accurate as of: 12/20/17 12:27 PM.  Always use your most recent med list.                   Brand Name Dispense Instructions for use Diagnosis    drospirenone-ethinyl estradiol 3-0.03 MG per tablet    HAKAN    84 tablet    Take 1 tablet by mouth daily    PMDD (premenstrual dysphoric disorder)       EPINEPHrine 0.3 MG/0.3ML injection 2-pack "    EPIPEN/ADRENACLICK/or ANY BX GENERIC EQUIV    0.6 mL    Inject 0.3 mLs (0.3 mg) into the muscle as needed for anaphylaxis    History of bee sting allergy       methylphenidate 5 MG tablet    RITALIN    60 tablet    Take 1 tablet (5 mg) by mouth 2 times daily    ADHD (attention deficit hyperactivity disorder), inattentive type       pantoprazole 40 MG EC tablet    PROTONIX    30 tablet    Take 1 tablet (40 mg) by mouth daily    ADHD (attention deficit hyperactivity disorder), inattentive type

## 2017-12-20 NOTE — PROGRESS NOTES
SUBJECTIVE:   Mariposa Nunez is a 22 year old female who presents to clinic today for the following health issues:    Vaginal Symptoms      Duration: 4 weeks    Description  States she thinks she noticed a bump on the left labial area of the vagina with a lot of itching and burning. Sometimes burning with urination    Intensity:  moderate    Accompanying signs and symptoms (fever/dysuria/abdominal or back pain): No fevers/abdominal pain    History  Sexually active: Never intercourse but has had oral sex before. Last was about 1 month ago.   Possibility of pregnancy: on birth control  Recent antibiotic use: no     Precipitating or alleviating factors: None- not tried any creams or lotions. Tried cotton underwear, tried not shaving the pubic hair for 1 month    Therapies tried and outcome: none   Outcome: NA    No vaginal discharge. LMP- 3 weeks ago.   States she only washes the area with water- no fragrant soaps or lotions or lubricants.   No prior similar problem.     Did state that 2 years ago had lots of vaginal sores- thought it was a reaction to HPV vaccine. [facility] thought this was herpes but they swabbed for it and it was negative so they thought it was a reaction to the HPV vaccine.     She does get cold sores in her mouth sometimes. No sores on her hands.    Problem list and histories reviewed & adjusted, as indicated.    Patient Active Problem List   Diagnosis     Anxiety state     History of bee sting allergy     ADHD (attention deficit hyperactivity disorder), inattentive type     Syncope, unspecified syncope type     History reviewed. No pertinent surgical history.    Social History   Substance Use Topics     Smoking status: Never Smoker     Smokeless tobacco: Never Used     Alcohol use No     Family History   Problem Relation Age of Onset     Neurologic Disorder Other      grandmother, aunts and uncles     Asthma Mother      Hypertension Mother      Asthma  "Sister      Congenital Anomalies Sister      Hypertension Other      also grandparents and uncle     Lipids Other      grandparents     Family History Negative Father      Lupus Maternal Aunt          Current Outpatient Prescriptions   Medication Sig Dispense Refill     drospirenone-ethinyl estradiol (HAKAN) 3-0.03 MG per tablet Take 1 tablet by mouth daily 84 tablet 3     EPINEPHrine (EPIPEN/ADRENACLICK/OR ANY BX GENERIC EQUIV) 0.3 MG/0.3ML injection 2-pack Inject 0.3 mLs (0.3 mg) into the muscle as needed for anaphylaxis 0.6 mL 11     pantoprazole (PROTONIX) 40 MG EC tablet Take 1 tablet (40 mg) by mouth daily 30 tablet 4     methylphenidate (RITALIN) 5 MG tablet Take 1 tablet (5 mg) by mouth 2 times daily (Patient not taking: Reported on 12/20/2017) 60 tablet 0     Allergies   Allergen Reactions     Bees      Severe swelling at sting site     Hpv Vaccine Recombinant (Yeast Derived) [Quadrivalent Hpv, 6,11,16,18]      Sores in mouth and vagina     No Clinical Screening - See Comments Nausea and Itching     Bell peppers     BP Readings from Last 3 Encounters:   12/20/17 118/70   11/22/17 106/62   08/21/17 106/62    Wt Readings from Last 3 Encounters:   12/20/17 139 lb 1.6 oz (63.1 kg)   08/21/17 138 lb 4.8 oz (62.7 kg)   06/23/17 138 lb (62.6 kg)         Reviewed and updated as needed this visit by clinical staff  Tobacco  Allergies  Meds  Problems  Med Hx  Surg Hx  Fam Hx  Soc Hx        Reviewed and updated as needed this visit by Provider  Allergies  Meds  Problems         ROS:  Constitutional, HEENT, cardiovascular, pulmonary, gi and gu systems are negative, except as otherwise noted.    OBJECTIVE:   /70 (BP Location: Right arm, Patient Position: Chair, Cuff Size: Adult Regular)  Pulse 94  Temp 98.4  F (36.9  C) (Tympanic)  Ht 5' 3.75\" (1.619 m)  Wt 139 lb 1.6 oz (63.1 kg)  SpO2 99%  BMI 24.06 kg/m2  Body mass index is 24.06 kg/(m^2).  GENERAL: healthy, alert and no distress  EYES: Eyes " grossly normal to inspection, conjunctivae and sclerae normal  HENT: Normocephalic, atraumatic, normal external ears. Nose and mouth normal with moist mucous membranes.  RESP: lungs clear to auscultation - no rales, rhonchi or wheezes  CV: regular rate and rhythm, normal S1 S2, no S3 or S4, no murmur, click or rub  ABDOMEN: soft, nontender, no hepatosplenomegaly, no masses and bowel sounds normal   (female): normal female external genitalia. Normal labia without erythema or lesions. Normal vaginal mucosa.  MS: no gross musculoskeletal defects noted, no edema    ASSESSMENT/PLAN:   (L29.8) Vaginal itching  (primary encounter diagnosis)  Comment: Differential included yeast, sexually transmitted infection (gonorrhea, chlamydia, HSV) or contact/irritant dermatitis. Her exam was quite normal without lesions or areas of concern for HSV. Gonorrhea and chlamydia was unlikely due to lack of sexual intercourse but usually doesn't cause pruritis. Given the pruritis, swabbed for yeast with wet prep but unfortunately the swab was mislabeled and needed to be canceled.   Given the suspicion for yeast, recommended treating empirically rather than re-swabbing. Discussed that she should return if worsening/not improving.   Recommended that she avoid soaps/lotions/shampoos that are scented so avoid irritant dermatitis.   Plan:  -Given fluconazole (DIFLUCAN) 150 MG tablet once    Follow-up if worsening/not improving or for annual physical (could time this after she turns 23 so that she could get Pap and STDs checked then)    Magaly Torrez MD  [facility] [place]      I discussed this case in depth with Dr. Torerz and agree with the key components of the history, assessment and plan.      Clara Mayo MD  Internal Medicine/Pediatrics

## 2017-12-20 NOTE — PATIENT INSTRUCTIONS
The swab was negative for yeast but we could try treating for yeast just in case. In the meantime, please avoid soaps/lotions in the area. Only wash with water.     It was a pleasure to see you today!    Dr. Magaly Torrez

## 2017-12-20 NOTE — NURSING NOTE
"Chief Complaint   Patient presents with     Vaginal Problem       Initial /70 (BP Location: Right arm, Patient Position: Chair, Cuff Size: Adult Regular)  Pulse 94  Temp 98.4  F (36.9  C) (Tympanic)  Ht 5' 3.75\" (1.619 m)  Wt 139 lb 1.6 oz (63.1 kg)  SpO2 99%  BMI 24.06 kg/m2 Estimated body mass index is 24.06 kg/(m^2) as calculated from the following:    Height as of this encounter: 5' 3.75\" (1.619 m).    Weight as of this encounter: 139 lb 1.6 oz (63.1 kg).  Medication Reconciliation: complete   Filomena Sapp LPN      "

## 2017-12-22 ENCOUNTER — OFFICE VISIT (OUTPATIENT)
Dept: OBGYN | Facility: CLINIC | Age: 22
End: 2017-12-22
Payer: COMMERCIAL

## 2017-12-22 ENCOUNTER — MYC MEDICAL ADVICE (OUTPATIENT)
Dept: OBGYN | Facility: CLINIC | Age: 22
End: 2017-12-22

## 2017-12-22 VITALS
SYSTOLIC BLOOD PRESSURE: 112 MMHG | DIASTOLIC BLOOD PRESSURE: 76 MMHG | BODY MASS INDEX: 23.85 KG/M2 | HEIGHT: 64 IN | HEART RATE: 86 BPM | WEIGHT: 139.7 LBS

## 2017-12-22 DIAGNOSIS — N89.8 VAGINAL ITCHING: Primary | ICD-10-CM

## 2017-12-22 LAB
SPECIMEN SOURCE: NORMAL
WET PREP SPEC: NORMAL

## 2017-12-22 PROCEDURE — 99213 OFFICE O/P EST LOW 20 MIN: CPT | Performed by: ADVANCED PRACTICE MIDWIFE

## 2017-12-22 PROCEDURE — 87210 SMEAR WET MOUNT SALINE/INK: CPT | Performed by: ADVANCED PRACTICE MIDWIFE

## 2017-12-22 NOTE — MR AVS SNAPSHOT
"              After Visit Summary   12/22/2017    Mariposa Nunez    MRN: [id number]           Patient Information     Date Of Birth          1995        Visit Information        Provider Department      12/22/2017 1:30 PM Caro Aranda APRN CNM [facility]        Today's Diagnoses     Vaginal itching    -  1       Follow-ups after your visit        Follow-up notes from your care team     Return if symptoms worsen or fail to improve.      Who to contact     If you have questions or need follow up information about today's clinic visit or your schedule please contact [facility] directly at [phone].  Normal or non-critical lab and imaging results will be communicated to you by [employer], letter or phone within 4 business days after the clinic has received the results. If you do not hear from us within 7 days, please contact the clinic through [employer] or phone. If you have a critical or abnormal lab result, we will notify you by phone as soon as possible.  Submit refill requests through [employer] or call your pharmacy and they will forward the refill request to us. Please allow 3 business days for your refill to be completed.          Additional Information About Your Visit        MyChart Information     [employer] gives you secure access to your electronic health record. If you see a primary care provider, you can also send messages to your care team and make appointments. If you have questions, please call your primary care clinic.  If you do not have a primary care provider, please call [phone] and they will assist you.        Care EveryWhere ID     This is your Care EveryWhere ID. This could be used by other organizations to access your [place] medical records  [id number]        Your Vitals Were     Pulse Height Last Period Breastfeeding? BMI (Body Mass Index)       86 5' 3.75\" (1.619 m) 11/27/2017 (Approximate) No 24.17 kg/m2        Blood Pressure from Last " 3 Encounters:   12/22/17 112/76   12/20/17 118/70   11/22/17 106/62    Weight from Last 3 Encounters:   12/22/17 139 lb 11.2 oz (63.4 kg)   12/20/17 139 lb 1.6 oz (63.1 kg)   08/21/17 138 lb 4.8 oz (62.7 kg)              We Performed the Following     Wet prep        Primary Care Provider Office Phone # Fax #    Nadine Villarreal [phone] [address] [place] [address]        Equal Access to Services     [facility]: Hadii aad ku hadasho Soomaali, waaxda luqadaha, qaybta kaalmada adeegyada, abi brantley . So [facility] [phone].    ATENCIÓN: Si habla español, tiene a acosta disposición servicios gratuitos de asistencia lingüística. [facility] [phone].    We comply with applicable federal civil rights laws and [place] laws. We do not discriminate on the basis of race, color, national origin, age, disability, sex, sexual orientation, or gender identity.            Thank you!     Thank you for choosing [facility]  for your care. Our goal is always to provide you with excellent care. Hearing back from our patients is one way we can continue to improve our services. Please take a few minutes to complete the written survey that you may receive in the mail after your visit with us. Thank you!             Your Updated Medication List - Protect others around you: Learn how to safely use, store and throw away your medicines at www.disposemymeds.org.          This list is accurate as of: 12/22/17  1:50 PM.  Always use your most recent med list.                   Brand Name Dispense Instructions for use Diagnosis    drospirenone-ethinyl estradiol 3-0.03 MG per tablet    HAKAN    84 tablet    Take 1 tablet by mouth daily    PMDD (premenstrual dysphoric disorder)       EPINEPHrine 0.3 MG/0.3ML injection 2-pack    EPIPEN/ADRENACLICK/or ANY BX GENERIC EQUIV    0.6 mL    Inject 0.3 mLs (0.3 mg) into the muscle as needed for anaphylaxis    History  of bee sting allergy       methylphenidate 5 MG tablet    RITALIN    60 tablet    Take 1 tablet (5 mg) by mouth 2 times daily    ADHD (attention deficit hyperactivity disorder), inattentive type       pantoprazole 40 MG EC tablet    PROTONIX    30 tablet    Take 1 tablet (40 mg) by mouth daily    ADHD (attention deficit hyperactivity disorder), inattentive type

## 2017-12-22 NOTE — TELEPHONE ENCOUNTER
"Per routing comment: \"She can if she would like. I recommend only the vagisil pH balance only. Its in a purple bottle. Thanks, Caro  (Routing comment)\" Relay to pt in [employer] message.  "

## 2017-12-22 NOTE — PROGRESS NOTES
SUBJECTIVE:                                                   Mariposa Nunez is a 22 year old who presents to clinic today for the following health issue(s):  Patient presents with:  Vaginal Problem: itching, burning      HPI:  Mariposa presents reporting vaginal itching and burning for the last month. She saw internal med two days ago and was treated for yeast with Diflucan. Patient reports no improvement of symptoms. She has never been sexually active. No risk of STI. She is using OCP's. She reports using Always brand pads and tampons during her cycle.     Patient's last menstrual period was 11/27/2017 (approximate).  Menstrual History: frequency: every 28 days  Patient is not sexually active  No obstetric history on file..  Using oral contraceptives for contraception.   Health maintenance updated:  yes  STI infx testing offered:  Declined    Last PHQ-9 score on record = No flowsheet data found.  Last GAD7 score on record = No flowsheet data found.  Alcohol Score = none    Problem list and histories reviewed & adjusted, as indicated.  Additional history: as documented.    Patient Active Problem List   Diagnosis     Anxiety state     History of bee sting allergy     ADHD (attention deficit hyperactivity disorder), inattentive type     Syncope, unspecified syncope type     No past surgical history on file.   Social History   Substance Use Topics     Smoking status: Never Smoker     Smokeless tobacco: Never Used     Alcohol use No      Problem (# of Occurrences) Relation (Name,Age of Onset)    Asthma (2) Mother, Sister    Congenital Anomalies (1) Sister    Family History Negative (1) Father    Hypertension (2) Mother, Other: also grandparents and uncle    Lipids (1) Other: grandparents    Lupus (1) Maternal Aunt    Neurologic Disorder (1) Other: grandmother, aunts and uncles            Current Outpatient Prescriptions   Medication Sig     drospirenone-ethinyl estradiol (HAKAN) 3-0.03 MG per tablet Take 1 tablet by  "mouth daily     EPINEPHrine (EPIPEN/ADRENACLICK/OR ANY BX GENERIC EQUIV) 0.3 MG/0.3ML injection 2-pack Inject 0.3 mLs (0.3 mg) into the muscle as needed for anaphylaxis     pantoprazole (PROTONIX) 40 MG EC tablet Take 1 tablet (40 mg) by mouth daily     methylphenidate (RITALIN) 5 MG tablet Take 1 tablet (5 mg) by mouth 2 times daily (Patient not taking: Reported on 12/20/2017)     No current facility-administered medications for this visit.      Allergies   Allergen Reactions     Bees      Severe swelling at sting site     Hpv Vaccine Recombinant (Yeast Derived) [Quadrivalent Hpv, 6,11,16,18]      Sores in mouth and vagina     No Clinical Screening - See Comments Nausea and Itching     Bell peppers       ROS:  C: NEGATIVE for fever, chills, change in weight  I: NEGATIVE for worrisome rashes, moles or lesions  E: NEGATIVE for vision changes or irritation  ENT: NEGATIVE for ear, mouth and throat problems  R: NEGATIVE for significant cough or SOB  B: NEGATIVE for masses, tenderness or discharge  CV: NEGATIVE for chest pain, palpitations or peripheral edema  GI: NEGATIVE for nausea, abdominal pain, heartburn, or change in bowel habits  : Positive for vaginal itching and burning.   M: NEGATIVE for significant arthralgias or myalgia  N: NEGATIVE for weakness, dizziness or paresthesias  P: NEGATIVE for changes in mood or affect    OBJECTIVE:     /76  Pulse 86  Ht 5' 3.75\" (1.619 m)  Wt 139 lb 11.2 oz (63.4 kg)  LMP 11/27/2017 (Approximate)  Breastfeeding? No  BMI 24.17 kg/m2  Body mass index is 24.17 kg/(m^2).    PHYSICAL EXAM:  Constitutional:  Appearance: Well nourished, well developed alert, in no acute distress  Chest:  Respiratory Effort:  Breathing unlabored  Gastrointestinal:  Abdominal Examination:  Abdomen nontender to palpation, tone normal without rigidity or guarding, no masses present, umbilicus without lesions; Liver/Spleen:  No hepatomegaly present, liver nontender to palpation; Hernias:  No " hernias present  Skin:General Inspection:  No rashes present, no lesions present, no areas of discoloration; Genitalia and Groin:  No rashes present, no lesions present, no areas of discoloration, no masses present.  Neurologic/Psychiatric:  Mental Status:  Oriented X3   Pelvic Exam:  External Genitalia:     Normal appearance for age, no discharge present, no tenderness present, no inflammatory lesions present, color normal  Vagina:     Moderate white discharge. Patient reports burning pain with insertion of speculum.   Bladder:     Nontender to palpation  Urethra:   Urethral Body:  Urethra palpation normal, urethra structural support normal   Urethral Meatus:  No erythema or lesions present  Cervix:     Appearance healthy, no lesions present, nontender to palpation, no bleeding present  Uterus:     Uterus: firm, normal sized and nontender, retroverted in position.   Adnexa:     No adnexal tenderness present, no adnexal masses present  Perineum:     Perineum within normal limits, no evidence of trauma, no rashes or skin lesions present  Anus:     Anus within normal limits, no hemorrhoids present  Inguinal Lymph Nodes:     No lymphadenopathy present  Pubic Hair:     Normal pubic hair distribution for age  Genitalia and Groin:     No rashes present, no lesions present, no areas of discoloration, no masses present       In-Clinic Test Results:  No results found for this or any previous visit (from the past 24 hour(s)).    ASSESSMENT/PLAN:                                                        ICD-10-CM    1. Vaginal itching L29.8 Wet prep       PLAN:    Wet prep collected. Advised patient to change brands of pads and tampons. Discussed proper vaginal hygiene including proper pubic hair removal. Will call patient with wet prep results to form a plan of care. Patient verbalizes understanding and agrees with plan.      Caro Aranda CNM

## 2017-12-22 NOTE — NURSING NOTE
"Chief Complaint   Patient presents with     Vaginal Problem     itching, burning       Initial /76  Pulse 86  Ht 5' 3.75\" (1.619 m)  Wt 139 lb 11.2 oz (63.4 kg)  LMP 11/27/2017 (Approximate)  Breastfeeding? No  BMI 24.17 kg/m2 Estimated body mass index is 24.17 kg/(m^2) as calculated from the following:    Height as of this encounter: 5' 3.75\" (1.619 m).    Weight as of this encounter: 139 lb 11.2 oz (63.4 kg).  Medication Reconciliation: complete     Danielle Naylor, [facility]      "

## 2017-12-31 ENCOUNTER — TRANSFERRED RECORDS (OUTPATIENT)
Dept: HEALTH INFORMATION MANAGEMENT | Facility: CLINIC | Age: 22
End: 2017-12-31

## 2018-01-01 ENCOUNTER — HOSPITAL ENCOUNTER (OUTPATIENT)
Facility: CLINIC | Age: 23
Setting detail: OBSERVATION
Discharge: HOME | End: 2018-01-02
Attending: PEDIATRICS | Admitting: SURGERY
Payer: COMMERCIAL

## 2018-01-01 ENCOUNTER — APPOINTMENT (OUTPATIENT)
Dept: ULTRASOUND IMAGING | Facility: CLINIC | Age: 23
End: 2018-01-01
Attending: PEDIATRICS
Payer: COMMERCIAL

## 2018-01-01 DIAGNOSIS — K37 APPENDICITIS, UNSPECIFIED APPENDICITIS TYPE: ICD-10-CM

## 2018-01-01 DIAGNOSIS — Z90.49 S/P LAPAROSCOPIC APPENDECTOMY: ICD-10-CM

## 2018-01-01 DIAGNOSIS — K37 APPENDICITIS: ICD-10-CM

## 2018-01-01 DIAGNOSIS — G89.18 ACUTE POST-OPERATIVE PAIN: Primary | ICD-10-CM

## 2018-01-01 LAB
ALBUMIN SERPL-MCNC: 3.5 G/DL (ref 3.4–5)
ALP SERPL-CCNC: 67 U/L (ref 40–150)
ALT SERPL W P-5'-P-CCNC: 19 U/L (ref 0–50)
ANION GAP SERPL CALCULATED.3IONS-SCNC: 6 MMOL/L (ref 3–14)
AST SERPL W P-5'-P-CCNC: 16 U/L (ref 0–45)
BASOPHILS # BLD AUTO: 0 10E9/L (ref 0–0.2)
BASOPHILS NFR BLD AUTO: 0.5 %
BILIRUB SERPL-MCNC: 0.3 MG/DL (ref 0.2–1.3)
BUN SERPL-MCNC: 11 MG/DL (ref 7–30)
CALCIUM SERPL-MCNC: 9 MG/DL (ref 8.5–10.1)
CHLORIDE SERPL-SCNC: 104 MMOL/L (ref 94–109)
CO2 SERPL-SCNC: 26 MMOL/L (ref 20–32)
CREAT SERPL-MCNC: 0.73 MG/DL (ref 0.52–1.04)
CRP SERPL-MCNC: 8 MG/L (ref 0–8)
DIFFERENTIAL METHOD BLD: NORMAL
EOSINOPHIL # BLD AUTO: 0 10E9/L (ref 0–0.7)
EOSINOPHIL NFR BLD AUTO: 0.2 %
ERYTHROCYTE [DISTWIDTH] IN BLOOD BY AUTOMATED COUNT: 13.6 % (ref 10–15)
GFR SERPL CREATININE-BSD FRML MDRD: >90 ML/MIN/1.7M2
GLUCOSE SERPL-MCNC: 87 MG/DL (ref 70–99)
HCG UR QL: NEGATIVE
HCT VFR BLD AUTO: 39.1 % (ref 35–47)
HGB BLD-MCNC: 12.7 G/DL (ref 11.7–15.7)
IMM GRANULOCYTES # BLD: 0 10E9/L (ref 0–0.4)
IMM GRANULOCYTES NFR BLD: 0.1 %
INTERNAL QC OK POCT: YES
LYMPHOCYTES # BLD AUTO: 2.1 10E9/L (ref 0.8–5.3)
LYMPHOCYTES NFR BLD AUTO: 23.5 %
MCH RBC QN AUTO: 26.5 PG (ref 26.5–33)
MCHC RBC AUTO-ENTMCNC: 32.5 G/DL (ref 31.5–36.5)
MCV RBC AUTO: 82 FL (ref 78–100)
MONOCYTES # BLD AUTO: 0.7 10E9/L (ref 0–1.3)
MONOCYTES NFR BLD AUTO: 8.3 %
NEUTROPHILS # BLD AUTO: 5.9 10E9/L (ref 1.6–8.3)
NEUTROPHILS NFR BLD AUTO: 67.4 %
NRBC # BLD AUTO: 0 10*3/UL
NRBC BLD AUTO-RTO: 0 /100
PLATELET # BLD AUTO: 380 10E9/L (ref 150–450)
POTASSIUM SERPL-SCNC: 3.7 MMOL/L (ref 3.4–5.3)
PROT SERPL-MCNC: 8.6 G/DL (ref 6.8–8.8)
RBC # BLD AUTO: 4.79 10E12/L (ref 3.8–5.2)
SODIUM SERPL-SCNC: 136 MMOL/L (ref 133–144)
WBC # BLD AUTO: 8.7 10E9/L (ref 4–11)

## 2018-01-01 PROCEDURE — 12000014 ZZH R&B PEDS UMMC

## 2018-01-01 PROCEDURE — 76705 ECHO EXAM OF ABDOMEN: CPT

## 2018-01-01 PROCEDURE — 99285 EMERGENCY DEPT VISIT HI MDM: CPT | Mod: 25 | Performed by: PEDIATRICS

## 2018-01-01 PROCEDURE — 87491 CHLMYD TRACH DNA AMP PROBE: CPT | Performed by: PEDIATRICS

## 2018-01-01 PROCEDURE — 81025 URINE PREGNANCY TEST: CPT | Performed by: PEDIATRICS

## 2018-01-01 PROCEDURE — 80053 COMPREHEN METABOLIC PANEL: CPT | Performed by: PEDIATRICS

## 2018-01-01 PROCEDURE — 96360 HYDRATION IV INFUSION INIT: CPT | Performed by: PEDIATRICS

## 2018-01-01 PROCEDURE — 96361 HYDRATE IV INFUSION ADD-ON: CPT | Performed by: PEDIATRICS

## 2018-01-01 PROCEDURE — 25800025 ZZH RX 258: Performed by: STUDENT IN AN ORGANIZED HEALTH CARE EDUCATION/TRAINING PROGRAM

## 2018-01-01 PROCEDURE — 25000128 H RX IP 250 OP 636: Performed by: STUDENT IN AN ORGANIZED HEALTH CARE EDUCATION/TRAINING PROGRAM

## 2018-01-01 PROCEDURE — 87591 N.GONORRHOEAE DNA AMP PROB: CPT | Performed by: PEDIATRICS

## 2018-01-01 PROCEDURE — 86140 C-REACTIVE PROTEIN: CPT | Performed by: PEDIATRICS

## 2018-01-01 PROCEDURE — 85025 COMPLETE CBC W/AUTO DIFF WBC: CPT | Performed by: PEDIATRICS

## 2018-01-01 PROCEDURE — 25000128 H RX IP 250 OP 636

## 2018-01-01 PROCEDURE — 99285 EMERGENCY DEPT VISIT HI MDM: CPT | Mod: Z6 | Performed by: PEDIATRICS

## 2018-01-01 RX ORDER — MORPHINE SULFATE 2 MG/ML
1-2 INJECTION, SOLUTION INTRAMUSCULAR; INTRAVENOUS EVERY 4 HOURS PRN
Status: DISCONTINUED | OUTPATIENT
Start: 2018-01-01 | End: 2018-01-02

## 2018-01-01 RX ORDER — PANTOPRAZOLE SODIUM 40 MG/1
40 TABLET, DELAYED RELEASE ORAL DAILY
Status: DISCONTINUED | OUTPATIENT
Start: 2018-01-02 | End: 2018-01-02 | Stop reason: HOSPADM

## 2018-01-01 RX ORDER — ACETAMINOPHEN 325 MG/1
325-650 TABLET ORAL EVERY 6 HOURS PRN
Status: DISCONTINUED | OUTPATIENT
Start: 2018-01-01 | End: 2018-01-02

## 2018-01-01 RX ORDER — SODIUM CHLORIDE 9 MG/ML
INJECTION, SOLUTION INTRAVENOUS
Status: COMPLETED
Start: 2018-01-01 | End: 2018-01-01

## 2018-01-01 RX ORDER — NALOXONE HYDROCHLORIDE 0.4 MG/ML
.1-.4 INJECTION, SOLUTION INTRAMUSCULAR; INTRAVENOUS; SUBCUTANEOUS
Status: DISCONTINUED | OUTPATIENT
Start: 2018-01-01 | End: 2018-01-02 | Stop reason: HOSPADM

## 2018-01-01 RX ORDER — DEXTROSE MONOHYDRATE, SODIUM CHLORIDE, AND POTASSIUM CHLORIDE 50; 1.49; 4.5 G/1000ML; G/1000ML; G/1000ML
INJECTION, SOLUTION INTRAVENOUS CONTINUOUS
Status: DISCONTINUED | OUTPATIENT
Start: 2018-01-01 | End: 2018-01-02 | Stop reason: HOSPADM

## 2018-01-01 RX ORDER — ACETAMINOPHEN 325 MG/1
10.8 TABLET ORAL EVERY 6 HOURS
Status: CANCELLED | OUTPATIENT
Start: 2018-01-01

## 2018-01-01 RX ORDER — IBUPROFEN 200 MG
10 TABLET ORAL EVERY 6 HOURS PRN
Status: DISCONTINUED | OUTPATIENT
Start: 2018-01-01 | End: 2018-01-02 | Stop reason: HOSPADM

## 2018-01-01 RX ORDER — OXYCODONE HYDROCHLORIDE 5 MG/1
5 TABLET ORAL EVERY 4 HOURS PRN
Status: DISCONTINUED | OUTPATIENT
Start: 2018-01-01 | End: 2018-01-02

## 2018-01-01 RX ORDER — ONDANSETRON 2 MG/ML
0.1 INJECTION INTRAMUSCULAR; INTRAVENOUS EVERY 4 HOURS PRN
Status: DISCONTINUED | OUTPATIENT
Start: 2018-01-01 | End: 2018-01-02 | Stop reason: HOSPADM

## 2018-01-01 RX ORDER — MORPHINE SULFATE 2 MG/ML
1-2 INJECTION, SOLUTION INTRAMUSCULAR; INTRAVENOUS
Status: DISCONTINUED | OUTPATIENT
Start: 2018-01-01 | End: 2018-01-01

## 2018-01-01 RX ADMIN — POTASSIUM CHLORIDE, DEXTROSE MONOHYDRATE AND SODIUM CHLORIDE: 150; 5; 450 INJECTION, SOLUTION INTRAVENOUS at 23:34

## 2018-01-01 RX ADMIN — SODIUM CHLORIDE 1000 ML: 0.9 INJECTION, SOLUTION INTRAVENOUS at 20:13

## 2018-01-01 RX ADMIN — Medication 1000 ML: at 20:13

## 2018-01-01 RX ADMIN — PIPERACILLIN SODIUM AND TAZOBACTAM SODIUM 3.38 G: 36; 4.5 INJECTION, POWDER, FOR SOLUTION INTRAVENOUS at 23:35

## 2018-01-01 NOTE — IP AVS SNAPSHOT
[facility]    [address]    [facility] [place] [address]    Phone:  [phone]                                       After Visit Summary   1/1/2018    Mariposa Nunez    MRN: [id number]           After Visit Summary Signature Page     I have received my discharge instructions, and my questions have been answered. I have discussed any challenges I see with this plan with the nurse or doctor.    ..........................................................................................................................................  Patient/Patient Representative Signature      ..........................................................................................................................................  Patient Representative Print Name and Relationship to Patient    ..................................................               ................................................  Date                                            Time    ..........................................................................................................................................  Reviewed by Signature/Title    ...................................................              ..............................................  Date                                                            Time

## 2018-01-01 NOTE — LETTER
Transition Communication Hand-off for Care Transitions to Next Level of Care Provider    Name: Mariposa Nunez  MRN #: [id number]  Primary Care Provider: Nadine Villarreal     Primary Clinic: [address] [place] [address] [address] SERGIO LOUIE [address]     Reason for Hospitalization:  Appendicitis [K37]  Admit Date/Time: 1/1/2018  7:00 PM  Discharge Date: 01/02/18    Payor Source: Payor: MEDICA / Plan: MEDICA CHOICE / Product Type: Indemnity /     Readmission Assessment Measure (MICHELET) Risk Score/category: Average        Reason for Communication Hand-off Referral: Fragility    Discharge Plan:  Home     Follow-up plan:  Future Appointments  Date Time Provider Department Center   1/15/2018 2:10 PM Nadine Villarreal MD [facility]   1/18/2018 2:45 PM Nura Hyman MD [facility]         Jinny Benavides    AVS/Discharge Summary is the source of truth; this is a helpful guide for improved communication of patient story

## 2018-01-01 NOTE — IP AVS SNAPSHOT
MRN:[id number]                      After Visit Summary   1/1/2018    Mariposa Nunez    MRN: [id number]           Thank you!     Thank you for choosing [facility] for your care. Our goal is always to provide you with excellent care. Hearing back from our patients is one way we can continue to improve our services. Please take a few minutes to complete the written survey that you may receive in the mail after you visit with us. Thank you!        Patient Information     Date Of Birth          1995        Designated Caregiver       Most Recent Value    Caregiver    Will someone help with your care after discharge? no      About your hospital stay     You were admitted on:  January 1, 2018 You last received care in the:  [facility]    You were discharged on:  January 2, 2018        Reason for your hospital stay       Patient was hospitalized for acute appendicitis                  Who to Call     For medical emergencies, please call 911.  For non-urgent questions about your medical care, please call your primary care provider or clinic, [phone]  For questions related to your surgery, please call your surgery clinic        Attending Provider     Provider Specialty    Jonas Arce MD Pediatrics - Pediatric Emergency Medicine    Wylie, Nura Demarco MD Pediatric Surgery       Primary Care Provider Office Phone # Fax #    Nadine Villarreal [phone]      After Care Instructions     Activity       Your activity upon discharge: activity as tolerated            Activity       Your activity upon discharge: return to activity gradually, no contact sports, heavy lifting, or strenuous exercise for 2-4 weeks. Mariposa may be excused from gym class and organized sports for 4 weeks or as directed at clinic follow up.            Activity       Your activity upon discharge: activity as tolerated. No lifting  restrictions.            Diet       Follow this diet upon discharge: Regular            Wound care and dressings       Instructions to care for your wound at home: as directed  1. May start to shower on the 2nd day after surgery.  2. No scrubbing at incisions until completely healed. No soaking in the incision for prolonged period of time for 1 week.   3. Steri-strips will fall off on their own in 1-2 weeks.                  Follow-up Appointments     Follow Up and recommended labs and tests       Follow up with Dr. Hyman , at [facility], within 2-3 weeks  to evaluate after surgery. No follow up labs or test are needed.                  Your next 10 appointments already scheduled     Frank 15, 2018  2:10 PM CST   [facility]    [address]  [address]  [facility] [id number] [phone]            Jan 18, 2018  2:45 PM CST   Return Visit with Nura Hyman MD   Peds Surgery ([facility])    [facility]  [address]  [address] [place] [id number] [phone]              Pending Results     Date and Time Order Name Status Description    1/2/2018 1039 Surgical pathology exam In process     1/1/2018 1924 POC US Abdomen Limited In process             Statement of Approval     Ordered          01/02/18 1427  I have reviewed and agree with all the recommendations and orders detailed in this document.  EFFECTIVE NOW     Approved and electronically signed by:  Geovanna Mason MD             Admission Information     Date & Time Provider Department Dept. Phone    1/1/2018 Nura Hyman MD [facility] [phone]      Your Vitals Were     Blood Pressure Pulse Temperature Respirations Weight Last Period    116/73 115 98.1  F (36.7  C) (Oral) 16 60.2 kg (132 lb 11.5 oz) 11/27/2017 (Approximate)    Pulse Oximetry BMI (Body Mass  "Index)                99% 22.96 kg/m2          [facility] [employer] Information     [employer] lets you send messages to your doctor, view your test results, renew your prescriptions, schedule appointments and more. To sign up, go to [email or web address] . Click on \"Log in\" on the left side of the screen, which will take you to the Welcome page. Then click on \"Sign up Now\" on the right side of the page.     You will be asked to enter the access code listed below, as well as some personal information. Please follow the directions to create your username and password.     Your access code is: [email or web address]  Expires: 2018  6:57 PM     Your access code will  in 90 days. If you need help or a new code, please call your [facility] clinic or [phone].        Care EveryWhere ID     This is your Care EveryWhere ID. This could be used by other organizations to access your [facility] medical records  [id number]        Equal Access to Services     REJI MCPHERSON : Hadii jeanna hillo Soshruti, waaxda luqadaha, qaybta kaalmada adeshilo, abi brantley . So [facility] [phone].    ATENCIÓN: Si habla español, tiene a acosta disposición servicios gratuitos de asistencia lingüística. Llame al [phone].    We comply with applicable federal civil rights laws and [place] laws. We do not discriminate on the basis of race, color, national origin, age, disability, sex, sexual orientation, or gender identity.               Review of your medicines      START taking        Dose / Directions    acetaminophen 325 MG tablet   Commonly known as:  TYLENOL   Used for:  Acute post-operative pain        Dose:  650 mg   Take 2 tablets (650 mg) by mouth every 4 hours as needed for mild pain or fever   Quantity:  1 Bottle   Refills:  1       ibuprofen 200 MG tablet   Commonly known as:  ADVIL/MOTRIN   Used for:  Acute post-operative pain        Dose:  400-600 mg   Take 2-3 tablets (400-600 mg) by mouth every 6 hours as needed " for fever ((temp greater than 38.0C, 100.4F) or mild pain)   Quantity:  100 tablet   Refills:  1       oxyCODONE IR 5 MG tablet   Commonly known as:  ROXICODONE   Used for:  Acute post-operative pain, S/P laparoscopic appendectomy        Dose:  5 mg   Take 1 tablet (5 mg) by mouth every 4 hours as needed for moderate to severe pain   Quantity:  12 tablet   Refills:  0       polyethylene glycol Packet   Commonly known as:  MIRALAX/GLYCOLAX   Used for:  S/P laparoscopic appendectomy        Dose:  17 g   Take 17 g by mouth daily Use while taking oxycodone to avoid constipation   Quantity:  7 packet   Refills:  1       senna 8.6 MG tablet   Commonly known as:  SENOKOT        Dose:  1 tablet   Take 1 tablet by mouth daily as needed for constipation   Quantity:  30 tablet   Refills:  0         CONTINUE these medicines which have NOT CHANGED        Dose / Directions    drospirenone-ethinyl estradiol 3-0.03 MG per tablet   Commonly known as:  HAKAN   Used for:  PMDD (premenstrual dysphoric disorder)        Dose:  1 tablet   Take 1 tablet by mouth daily   Quantity:  84 tablet   Refills:  3       EPINEPHrine 0.3 MG/0.3ML injection 2-pack   Commonly known as:  EPIPEN/ADRENACLICK/or ANY BX GENERIC EQUIV   Used for:  History of bee sting allergy        Dose:  0.3 mg   Inject 0.3 mLs (0.3 mg) into the muscle as needed for anaphylaxis   Quantity:  0.6 mL   Refills:  11       methylphenidate 5 MG tablet   Commonly known as:  RITALIN   Used for:  ADHD (attention deficit hyperactivity disorder), inattentive type        Dose:  5 mg   Take 1 tablet (5 mg) by mouth 2 times daily   Quantity:  60 tablet   Refills:  0       pantoprazole 40 MG EC tablet   Commonly known as:  PROTONIX   Used for:  ADHD (attention deficit hyperactivity disorder), inattentive type        Dose:  40 mg   Take 1 tablet (40 mg) by mouth daily   Quantity:  30 tablet   Refills:  4            Where to get your medicines      These medications were sent to [facility] [place], [place] - [address]  [address] [facility] [address], [facility] [address]     Phone:  [phone]     acetaminophen 325 MG tablet    ibuprofen 200 MG tablet    polyethylene glycol Packet    senna 8.6 MG tablet         Some of these will need a paper prescription and others can be bought over the counter. Ask your nurse if you have questions.     Bring a paper prescription for each of these medications     oxyCODONE IR 5 MG tablet                Protect others around you: Learn how to safely use, store and throw away your medicines at www.disposemymeds.org.             Medication List: This is a list of all your medications and when to take them. Check marks below indicate your daily home schedule. Keep this list as a reference.      Medications           Morning Afternoon Evening Bedtime As Needed    acetaminophen 325 MG tablet   Commonly known as:  TYLENOL   Take 2 tablets (650 mg) by mouth every 4 hours as needed for mild pain or fever   Last time this was given:  650 mg on 1/2/2018  3:22 PM                                   drospirenone-ethinyl estradiol 3-0.03 MG per tablet   Commonly known as:  HAAKN   Take 1 tablet by mouth daily                                   EPINEPHrine 0.3 MG/0.3ML injection 2-pack   Commonly known as:  EPIPEN/ADRENACLICK/or ANY BX GENERIC EQUIV   Inject 0.3 mLs (0.3 mg) into the muscle as needed for anaphylaxis                                   ibuprofen 200 MG tablet   Commonly known as:  ADVIL/MOTRIN   Take 2-3 tablets (400-600 mg) by mouth every 6 hours as needed for fever ((temp greater than 38.0C, 100.4F) or mild pain)   Last time this was given:  600 mg on 1/2/2018 12:10 AM                                   methylphenidate 5 MG tablet   Commonly known as:  RITALIN   Take 1 tablet (5 mg) by mouth 2 times daily                                      oxyCODONE IR 5 MG tablet   Commonly known as:  ROXICODONE   Take 1 tablet (5 mg) by mouth every 4 hours as  needed for moderate to severe pain   Last time this was given:  5 mg on 1/2/2018  1:12 PM                                   pantoprazole 40 MG EC tablet   Commonly known as:  PROTONIX   Take 1 tablet (40 mg) by mouth daily   Last time this was given:  40 mg on 1/2/2018  8:08 AM                                   polyethylene glycol Packet   Commonly known as:  MIRALAX/GLYCOLAX   Take 17 g by mouth daily Use while taking oxycodone to avoid constipation                                   senna 8.6 MG tablet   Commonly known as:  SENOKOT   Take 1 tablet by mouth daily as needed for constipation

## 2018-01-02 ENCOUNTER — ANESTHESIA (OUTPATIENT)
Dept: SURGERY | Facility: CLINIC | Age: 23
End: 2018-01-02
Payer: COMMERCIAL

## 2018-01-02 ENCOUNTER — SURGERY (OUTPATIENT)
Age: 23
End: 2018-01-02
Payer: COMMERCIAL

## 2018-01-02 ENCOUNTER — ANESTHESIA EVENT (OUTPATIENT)
Dept: SURGERY | Facility: CLINIC | Age: 23
End: 2018-01-02
Payer: COMMERCIAL

## 2018-01-02 VITALS
DIASTOLIC BLOOD PRESSURE: 72 MMHG | SYSTOLIC BLOOD PRESSURE: 116 MMHG | RESPIRATION RATE: 17 BRPM | WEIGHT: 132.72 LBS | BODY MASS INDEX: 22.96 KG/M2 | TEMPERATURE: 98.1 F | HEART RATE: 115 BPM | OXYGEN SATURATION: 97 %

## 2018-01-02 LAB
ALBUMIN UR-MCNC: NEGATIVE MG/DL
APPEARANCE UR: CLEAR
BACTERIA #/AREA URNS HPF: ABNORMAL /HPF
BILIRUB UR QL STRIP: NEGATIVE
C TRACH DNA SPEC QL NAA+PROBE: NEGATIVE
COLOR UR AUTO: ABNORMAL
ERYTHROCYTE [DISTWIDTH] IN BLOOD BY AUTOMATED COUNT: 13.6 % (ref 10–15)
GLUCOSE UR STRIP-MCNC: NEGATIVE MG/DL
HCG UR QL: NEGATIVE
HCT VFR BLD AUTO: 33.5 % (ref 35–47)
HGB BLD-MCNC: 11 G/DL (ref 11.7–15.7)
HGB UR QL STRIP: NEGATIVE
KETONES UR STRIP-MCNC: 10 MG/DL
LEUKOCYTE ESTERASE UR QL STRIP: NEGATIVE
MCH RBC QN AUTO: 26.4 PG (ref 26.5–33)
MCHC RBC AUTO-ENTMCNC: 32.8 G/DL (ref 31.5–36.5)
MCV RBC AUTO: 80 FL (ref 78–100)
N GONORRHOEA DNA SPEC QL NAA+PROBE: NEGATIVE
NITRATE UR QL: NEGATIVE
PH UR STRIP: 5.5 PH (ref 5–7)
PLATELET # BLD AUTO: 297 10E9/L (ref 150–450)
RBC # BLD AUTO: 4.17 10E12/L (ref 3.8–5.2)
RBC #/AREA URNS AUTO: 0 /HPF (ref 0–2)
SOURCE: ABNORMAL
SP GR UR STRIP: 1 (ref 1–1.03)
SPECIMEN SOURCE: NORMAL
SPECIMEN SOURCE: NORMAL
SQUAMOUS #/AREA URNS AUTO: <1 /HPF (ref 0–1)
UROBILINOGEN UR STRIP-MCNC: NORMAL MG/DL (ref 0–2)
WBC # BLD AUTO: 8 10E9/L (ref 4–11)
WBC #/AREA URNS AUTO: <1 /HPF (ref 0–2)

## 2018-01-02 PROCEDURE — 25000128 H RX IP 250 OP 636: Performed by: STUDENT IN AN ORGANIZED HEALTH CARE EDUCATION/TRAINING PROGRAM

## 2018-01-02 PROCEDURE — 71000015 ZZH RECOVERY PHASE 1 LEVEL 2 EA ADDTL HR: Performed by: SURGERY

## 2018-01-02 PROCEDURE — 36000059 ZZH SURGERY LEVEL 3 EA 15 ADDTL MIN UMMC: Performed by: SURGERY

## 2018-01-02 PROCEDURE — 44970 LAPAROSCOPY APPENDECTOMY: CPT | Mod: GC | Performed by: SURGERY

## 2018-01-02 PROCEDURE — 25000125 ZZHC RX 250: Performed by: NURSE ANESTHETIST, CERTIFIED REGISTERED

## 2018-01-02 PROCEDURE — 25800025 ZZH RX 258: Performed by: SURGERY

## 2018-01-02 PROCEDURE — 25000125 ZZHC RX 250: Performed by: SURGERY

## 2018-01-02 PROCEDURE — 36415 COLL VENOUS BLD VENIPUNCTURE: CPT | Performed by: STUDENT IN AN ORGANIZED HEALTH CARE EDUCATION/TRAINING PROGRAM

## 2018-01-02 PROCEDURE — 85027 COMPLETE CBC AUTOMATED: CPT | Performed by: STUDENT IN AN ORGANIZED HEALTH CARE EDUCATION/TRAINING PROGRAM

## 2018-01-02 PROCEDURE — G0378 HOSPITAL OBSERVATION PER HR: HCPCS

## 2018-01-02 PROCEDURE — 37000008 ZZH ANESTHESIA TECHNICAL FEE, 1ST 30 MIN: Performed by: SURGERY

## 2018-01-02 PROCEDURE — 81025 URINE PREGNANCY TEST: CPT | Performed by: STUDENT IN AN ORGANIZED HEALTH CARE EDUCATION/TRAINING PROGRAM

## 2018-01-02 PROCEDURE — 36000057 ZZH SURGERY LEVEL 3 1ST 30 MIN - UMMC: Performed by: SURGERY

## 2018-01-02 PROCEDURE — 25000132 ZZH RX MED GY IP 250 OP 250 PS 637: Performed by: STUDENT IN AN ORGANIZED HEALTH CARE EDUCATION/TRAINING PROGRAM

## 2018-01-02 PROCEDURE — 25800025 ZZH RX 258: Performed by: STUDENT IN AN ORGANIZED HEALTH CARE EDUCATION/TRAINING PROGRAM

## 2018-01-02 PROCEDURE — 88304 TISSUE EXAM BY PATHOLOGIST: CPT | Performed by: SURGERY

## 2018-01-02 PROCEDURE — 25000566 ZZH SEVOFLURANE, EA 15 MIN: Performed by: SURGERY

## 2018-01-02 PROCEDURE — 71000014 ZZH RECOVERY PHASE 1 LEVEL 2 FIRST HR: Performed by: SURGERY

## 2018-01-02 PROCEDURE — 88304 TISSUE EXAM BY PATHOLOGIST: CPT | Mod: 26 | Performed by: SURGERY

## 2018-01-02 PROCEDURE — 25000132 ZZH RX MED GY IP 250 OP 250 PS 637: Performed by: NURSE PRACTITIONER

## 2018-01-02 PROCEDURE — 40000170 ZZH STATISTIC PRE-PROCEDURE ASSESSMENT II: Performed by: SURGERY

## 2018-01-02 PROCEDURE — 25000128 H RX IP 250 OP 636: Performed by: ANESTHESIOLOGY

## 2018-01-02 PROCEDURE — 37000009 ZZH ANESTHESIA TECHNICAL FEE, EACH ADDTL 15 MIN: Performed by: SURGERY

## 2018-01-02 PROCEDURE — 81001 URINALYSIS AUTO W/SCOPE: CPT | Performed by: PEDIATRICS

## 2018-01-02 PROCEDURE — 27210794 ZZH OR GENERAL SUPPLY STERILE: Performed by: SURGERY

## 2018-01-02 PROCEDURE — 25000128 H RX IP 250 OP 636: Performed by: NURSE ANESTHETIST, CERTIFIED REGISTERED

## 2018-01-02 RX ORDER — BUPIVACAINE HYDROCHLORIDE 2.5 MG/ML
INJECTION, SOLUTION INFILTRATION; PERINEURAL PRN
Status: DISCONTINUED | OUTPATIENT
Start: 2018-01-02 | End: 2018-01-02 | Stop reason: HOSPADM

## 2018-01-02 RX ORDER — IBUPROFEN 200 MG
400-600 TABLET ORAL EVERY 6 HOURS PRN
Qty: 100 TABLET | Refills: 1 | Status: SHIPPED | OUTPATIENT
Start: 2018-01-02 | End: 2018-08-20

## 2018-01-02 RX ORDER — ACETAMINOPHEN 325 MG/1
650 TABLET ORAL EVERY 4 HOURS PRN
Qty: 1 BOTTLE | Refills: 1 | Status: SHIPPED | OUTPATIENT
Start: 2018-01-02 | End: 2018-08-20

## 2018-01-02 RX ORDER — ONDANSETRON 2 MG/ML
4 INJECTION INTRAMUSCULAR; INTRAVENOUS EVERY 30 MIN PRN
Status: DISCONTINUED | OUTPATIENT
Start: 2018-01-02 | End: 2018-01-02 | Stop reason: HOSPADM

## 2018-01-02 RX ORDER — SENNOSIDES A AND B 8.6 MG/1
1 TABLET, FILM COATED ORAL DAILY PRN
Qty: 30 TABLET | Refills: 0 | Status: SHIPPED | OUTPATIENT
Start: 2018-01-02 | End: 2018-01-15

## 2018-01-02 RX ORDER — KETOROLAC TROMETHAMINE 30 MG/ML
INJECTION, SOLUTION INTRAMUSCULAR; INTRAVENOUS PRN
Status: DISCONTINUED | OUTPATIENT
Start: 2018-01-02 | End: 2018-01-02

## 2018-01-02 RX ORDER — ACETAMINOPHEN 325 MG/1
325-650 TABLET ORAL EVERY 6 HOURS PRN
Qty: 100 TABLET | Refills: 0 | Status: SHIPPED | OUTPATIENT
Start: 2018-01-02 | End: 2018-01-02

## 2018-01-02 RX ORDER — OXYCODONE HYDROCHLORIDE 5 MG/1
5 TABLET ORAL EVERY 4 HOURS PRN
Status: DISCONTINUED | OUTPATIENT
Start: 2018-01-02 | End: 2018-01-02 | Stop reason: HOSPADM

## 2018-01-02 RX ORDER — PROPOFOL 10 MG/ML
INJECTION, EMULSION INTRAVENOUS PRN
Status: DISCONTINUED | OUTPATIENT
Start: 2018-01-02 | End: 2018-01-02

## 2018-01-02 RX ORDER — ONDANSETRON 2 MG/ML
INJECTION INTRAMUSCULAR; INTRAVENOUS PRN
Status: DISCONTINUED | OUTPATIENT
Start: 2018-01-02 | End: 2018-01-02

## 2018-01-02 RX ORDER — DIMENHYDRINATE 50 MG/ML
6.25 INJECTION, SOLUTION INTRAMUSCULAR; INTRAVENOUS
Status: DISCONTINUED | OUTPATIENT
Start: 2018-01-02 | End: 2018-01-02 | Stop reason: HOSPADM

## 2018-01-02 RX ORDER — LIDOCAINE 40 MG/G
CREAM TOPICAL
Status: DISCONTINUED | OUTPATIENT
Start: 2018-01-02 | End: 2018-01-02 | Stop reason: HOSPADM

## 2018-01-02 RX ORDER — MORPHINE SULFATE 2 MG/ML
1-2 INJECTION, SOLUTION INTRAMUSCULAR; INTRAVENOUS
Status: DISCONTINUED | OUTPATIENT
Start: 2018-01-02 | End: 2018-01-02 | Stop reason: HOSPADM

## 2018-01-02 RX ORDER — FENTANYL CITRATE 50 UG/ML
INJECTION, SOLUTION INTRAMUSCULAR; INTRAVENOUS PRN
Status: DISCONTINUED | OUTPATIENT
Start: 2018-01-02 | End: 2018-01-02

## 2018-01-02 RX ORDER — SODIUM CHLORIDE, SODIUM LACTATE, POTASSIUM CHLORIDE, CALCIUM CHLORIDE 600; 310; 30; 20 MG/100ML; MG/100ML; MG/100ML; MG/100ML
INJECTION, SOLUTION INTRAVENOUS CONTINUOUS PRN
Status: DISCONTINUED | OUTPATIENT
Start: 2018-01-02 | End: 2018-01-02

## 2018-01-02 RX ORDER — DEXAMETHASONE SODIUM PHOSPHATE 4 MG/ML
INJECTION, SOLUTION INTRA-ARTICULAR; INTRALESIONAL; INTRAMUSCULAR; INTRAVENOUS; SOFT TISSUE PRN
Status: DISCONTINUED | OUTPATIENT
Start: 2018-01-02 | End: 2018-01-02

## 2018-01-02 RX ORDER — NALOXONE HYDROCHLORIDE 0.4 MG/ML
.1-.4 INJECTION, SOLUTION INTRAMUSCULAR; INTRAVENOUS; SUBCUTANEOUS
Status: DISCONTINUED | OUTPATIENT
Start: 2018-01-02 | End: 2018-01-02 | Stop reason: HOSPADM

## 2018-01-02 RX ORDER — SODIUM CHLORIDE, SODIUM LACTATE, POTASSIUM CHLORIDE, CALCIUM CHLORIDE 600; 310; 30; 20 MG/100ML; MG/100ML; MG/100ML; MG/100ML
INJECTION, SOLUTION INTRAVENOUS CONTINUOUS
Status: DISCONTINUED | OUTPATIENT
Start: 2018-01-02 | End: 2018-01-02 | Stop reason: HOSPADM

## 2018-01-02 RX ORDER — ACETAMINOPHEN 325 MG/1
10.8 TABLET ORAL EVERY 6 HOURS
Status: DISCONTINUED | OUTPATIENT
Start: 2018-01-02 | End: 2018-01-02 | Stop reason: HOSPADM

## 2018-01-02 RX ORDER — OXYCODONE HYDROCHLORIDE 5 MG/1
5 TABLET ORAL EVERY 4 HOURS PRN
Qty: 12 TABLET | Refills: 0 | Status: SHIPPED | OUTPATIENT
Start: 2018-01-02 | End: 2018-01-15

## 2018-01-02 RX ORDER — IBUPROFEN 200 MG
200 TABLET ORAL EVERY 6 HOURS PRN
Qty: 100 TABLET | Refills: 0 | Status: SHIPPED | OUTPATIENT
Start: 2018-01-02 | End: 2018-01-02

## 2018-01-02 RX ORDER — POLYETHYLENE GLYCOL 3350 17 G/17G
17 POWDER, FOR SOLUTION ORAL DAILY
Status: DISCONTINUED | OUTPATIENT
Start: 2018-01-02 | End: 2018-01-02 | Stop reason: HOSPADM

## 2018-01-02 RX ORDER — ACETAMINOPHEN 325 MG/1
10.8 TABLET ORAL EVERY 8 HOURS
Status: DISCONTINUED | OUTPATIENT
Start: 2018-01-02 | End: 2018-01-02

## 2018-01-02 RX ORDER — POLYETHYLENE GLYCOL 3350 17 G/17G
17 POWDER, FOR SOLUTION ORAL DAILY
Qty: 7 PACKET | Refills: 1 | Status: SHIPPED | OUTPATIENT
Start: 2018-01-02 | End: 2018-01-15

## 2018-01-02 RX ORDER — ONDANSETRON 4 MG/1
4 TABLET, ORALLY DISINTEGRATING ORAL EVERY 30 MIN PRN
Status: DISCONTINUED | OUTPATIENT
Start: 2018-01-02 | End: 2018-01-02 | Stop reason: HOSPADM

## 2018-01-02 RX ORDER — LIDOCAINE HYDROCHLORIDE 20 MG/ML
INJECTION, SOLUTION INFILTRATION; PERINEURAL PRN
Status: DISCONTINUED | OUTPATIENT
Start: 2018-01-02 | End: 2018-01-02

## 2018-01-02 RX ORDER — FENTANYL CITRATE 50 UG/ML
25-50 INJECTION, SOLUTION INTRAMUSCULAR; INTRAVENOUS
Status: DISCONTINUED | OUTPATIENT
Start: 2018-01-02 | End: 2018-01-02 | Stop reason: HOSPADM

## 2018-01-02 RX ADMIN — KETOROLAC TROMETHAMINE 30 MG: 30 INJECTION, SOLUTION INTRAMUSCULAR at 10:45

## 2018-01-02 RX ADMIN — PROPOFOL 200 MG: 10 INJECTION, EMULSION INTRAVENOUS at 10:06

## 2018-01-02 RX ADMIN — POTASSIUM CHLORIDE, DEXTROSE MONOHYDRATE AND SODIUM CHLORIDE: 150; 5; 450 INJECTION, SOLUTION INTRAVENOUS at 13:13

## 2018-01-02 RX ADMIN — ONDANSETRON 4 MG: 2 INJECTION INTRAMUSCULAR; INTRAVENOUS at 10:06

## 2018-01-02 RX ADMIN — SODIUM CHLORIDE, POTASSIUM CHLORIDE, SODIUM LACTATE AND CALCIUM CHLORIDE: 600; 310; 30; 20 INJECTION, SOLUTION INTRAVENOUS at 10:00

## 2018-01-02 RX ADMIN — FENTANYL CITRATE 25 MCG: 50 INJECTION, SOLUTION INTRAMUSCULAR; INTRAVENOUS at 11:34

## 2018-01-02 RX ADMIN — LIDOCAINE HYDROCHLORIDE 60 MG: 20 INJECTION, SOLUTION INFILTRATION; PERINEURAL at 10:06

## 2018-01-02 RX ADMIN — OXYCODONE HYDROCHLORIDE 5 MG: 5 TABLET ORAL at 13:12

## 2018-01-02 RX ADMIN — Medication 5 MG: at 10:06

## 2018-01-02 RX ADMIN — POTASSIUM CHLORIDE, DEXTROSE MONOHYDRATE AND SODIUM CHLORIDE: 150; 5; 450 INJECTION, SOLUTION INTRAVENOUS at 09:28

## 2018-01-02 RX ADMIN — Medication 100 MG: at 10:06

## 2018-01-02 RX ADMIN — SODIUM CHLORIDE 400 ML: 900 IRRIGANT IRRIGATION at 10:41

## 2018-01-02 RX ADMIN — MIDAZOLAM 2 MG: 1 INJECTION INTRAMUSCULAR; INTRAVENOUS at 10:00

## 2018-01-02 RX ADMIN — HYDROMORPHONE HYDROCHLORIDE 0.5 MG: 1 INJECTION, SOLUTION INTRAMUSCULAR; INTRAVENOUS; SUBCUTANEOUS at 11:10

## 2018-01-02 RX ADMIN — FENTANYL CITRATE 100 MCG: 50 INJECTION, SOLUTION INTRAMUSCULAR; INTRAVENOUS at 10:06

## 2018-01-02 RX ADMIN — PIPERACILLIN SODIUM AND TAZOBACTAM SODIUM 3.38 G: 36; 4.5 INJECTION, POWDER, FOR SOLUTION INTRAVENOUS at 10:20

## 2018-01-02 RX ADMIN — FENTANYL CITRATE 25 MCG: 50 INJECTION, SOLUTION INTRAMUSCULAR; INTRAVENOUS at 11:52

## 2018-01-02 RX ADMIN — DEXAMETHASONE SODIUM PHOSPHATE 8 MG: 4 INJECTION, SOLUTION INTRAMUSCULAR; INTRAVENOUS at 10:06

## 2018-01-02 RX ADMIN — PANTOPRAZOLE SODIUM 40 MG: 40 TABLET, DELAYED RELEASE ORAL at 08:08

## 2018-01-02 RX ADMIN — ACETAMINOPHEN 650 MG: 325 TABLET, FILM COATED ORAL at 15:22

## 2018-01-02 RX ADMIN — IBUPROFEN 600 MG: 200 TABLET, FILM COATED ORAL at 00:10

## 2018-01-02 RX ADMIN — HYDROMORPHONE HYDROCHLORIDE 0.5 MG: 1 INJECTION, SOLUTION INTRAMUSCULAR; INTRAVENOUS; SUBCUTANEOUS at 10:26

## 2018-01-02 RX ADMIN — PIPERACILLIN SODIUM AND TAZOBACTAM SODIUM 3.38 G: 36; 4.5 INJECTION, POWDER, FOR SOLUTION INTRAVENOUS at 05:16

## 2018-01-02 RX ADMIN — BUPIVACAINE HYDROCHLORIDE 27 ML: 2.5 INJECTION, SOLUTION INFILTRATION; PERINEURAL at 10:47

## 2018-01-02 ASSESSMENT — ACTIVITIES OF DAILY LIVING (ADL)
SWALLOWING: 0-->SWALLOWS FOODS/LIQUIDS WITHOUT DIFFICULTY
TOILETING: 0-->INDEPENDENT
COGNITION: 0 - NO COGNITION ISSUES REPORTED
RETIRED_COMMUNICATION: 0-->UNDERSTANDS/COMMUNICATES WITHOUT DIFFICULTY
FALL_HISTORY_WITHIN_LAST_SIX_MONTHS: NO
BATHING: 0-->INDEPENDENT
TRANSFERRING: 0-->INDEPENDENT
RETIRED_EATING: 0-->INDEPENDENT
DRESS: 0-->INDEPENDENT
AMBULATION: 0-->INDEPENDENT

## 2018-01-02 ASSESSMENT — ENCOUNTER SYMPTOMS: ROS GI COMMENTS: ACUTE APPENDICITIS

## 2018-01-02 NOTE — BRIEF OP NOTE
[facility], [place]    Brief Operative Note    Pre-operative diagnosis: appendicitis   Post-operative diagnosis same  Procedure: Laparoscopic appendectomy, wound class II  Surgeon: Surgeon(s) and Role:     * Nura Hyman MD - Primary  Anesthesia: General   Estimated blood loss: None  Drains: None  Specimens:   ID Type Source Tests Collected by Time Destination   A :  Tissue Appendix SURGICAL PATHOLOGY EXAM Nura Hyman MD 1/2/2018 10:38 AM      Findings:   See op note for details  Complications: None.  Implants: None.

## 2018-01-02 NOTE — CONSULTS
Pediatric Surgery H&P Note    Patient name: Mariposa Nunez  Date of Service: January 1, 2018  Reason for admission: RLQ pain    HPI: Patient is a 23 yo otherwise healthy female who presents with 2 days of worsening RLQ abdominal pain. She initially had generalized periumbilical pain 4 days ago. She had a Ua yesterday that was negative for nitrites and leuk esterases. Nonetheless, she has been been treated for UTI and has been on Bactrim due to some symptoms of dysuria. She reports worsening RLQ pain starting perhaps 2 days ago and has been having non-bloody diarrhea today. The pain has been sharp, and she has not been able to eat or drink much a home. Denied subjective fevers and chills. No history of similar pain episodes.     PMH: none  PSH: none  Meds: birth control   Allergies: NDKA  FamHx: mother has UC  SocHx: traveled to [facility] recently. Denied smoking and EtOh use.     ROS:   A 10 point review of systems was performed and was found to be negative except for those noted in the above HPI.     Objective:   /88  Pulse 120  Temp 98.4  F (36.9  C) (Tympanic)  Resp 16  Wt 60.2 kg (132 lb 11.5 oz)  LMP 11/27/2017 (Approximate)  SpO2 98%  BMI 22.96 kg/m2  General - no acute distress  HEENT - no scleral icterus  Cardio - RRR, no rubs/murmurs/gallops  Lungs - NLB, CTAB, no rales  abd - nondistended, soft, RLQ tenderness with guarding. Negative  Rovsing's sign.   Neuro- CN grossly intact  Extremities - no LE edema  Skin - no jaundice, rashes  Psych - behavior appropriate    Labs: Reviewed and notable for:  WBC 8.7  CRP 8    Imaging: CT from urgency from yesterday reviewed. Mild stranding around a nondilated appendix. No inflammation or dilatation of the small bowel.   Ultrasound today: 7.4 mm appendix, mildly enlarged. No free fluid or surrounding  inflamed mesenteric fat. Early appendicitis is not excluded.     Assessment:  22 year old otherwise healthy female with possible early appendicitis.                Plan:  - admit to surgery  - Pain control with PRN pain meds  - IV zosyn   - repeat labs in the AM   - serial abdominal exams.   - If abdominal pain does not improve with fluids and antibiotics, then would re-evaluate for exploratory laparoscopy for appendicitis    Case discussed with team and staff, Dr. Hyman.     Geovanna Mason MD  Surgery, PGY-4  [phone].  ___________________________________________________________________________  Addendum: Repeat abdominal exam performed at 12am. Patient sitting up and actively searching for Netflix. Reports that abdominal pain started to subside with fluid, but has now returned in the RLQ, at the area below or at the level of the inguinal ligament. Denied nausea, vomiting.   abd exam stable with moderate tenderness in the RLQ with guarding. Mildly distended.     A/p:   - continue antibiotics.   - PRN pain meds     Geovanna Mason MD          Patient seen on early rounds on 1/2/2018. Overnight her pain has improved and she is now a little hungry. Her vitals have been normal. On my exam she is mildly tender in RLQ with no guarding., trace referred pain. I discussed with her and her parents, will hold on operation for now, try a diet and see if she cont to improve. WBC is pending.

## 2018-01-02 NOTE — ANESTHESIA CARE TRANSFER NOTE
Patient: Mariposa Nunez    Procedure(s):  Laparoscopic appendectomy  - Wound Class: III-Contaminated    Diagnosis: appendicitis   Diagnosis Additional Information: No value filed.    Anesthesia Type:   General, ETT, RSI     Note:  Airway :Face Mask  Patient transferred to:PACU  Comments: Maintains airway and sats, complaining of pain at umbilicus site, ice pack placed and Dilaudid given for patient comfort, report to PACU, RN.Handoff Report: Identifed the Patient, Identified the Reponsible Provider, Reviewed the pertinent medical history, Discussed the surgical course, Reviewed Intra-OP anesthesia mangement and issues during anesthesia, Set expectations for post-procedure period and Allowed opportunity for questions and acknowledgement of understanding      Vitals: (Last set prior to Anesthesia Care Transfer)    CRNA VITALS  1/2/2018 1030 - 1/2/2018 1118      1/2/2018             Resp Rate (observed): (!)  1                Electronically Signed By: MARIAM Crain CRNA  January 2, 2018  11:18 AM

## 2018-01-02 NOTE — ANESTHESIA PREPROCEDURE EVALUATION
Anesthesia Evaluation    ROS/Med Hx    No history of anesthetic complications  (-) malignant hyperthermia and tuberculosis    Cardiovascular Findings - negative ROS    Neuro Findings - negative ROS    Pulmonary Findings - negative ROS    HENT Findings - negative HENT ROS    Skin Findings - negative skin ROS      GI/Hepatic/Renal Findings   Comments: Acute appendicitis    Endocrine/Metabolic Findings - negative ROS      Genetic/Syndrome Findings - negative genetics/syndromes ROS    Hematology/Oncology Findings - negative hematology/oncology ROS    Additional Notes  ADHD  Remote hx/o syncope, negative echo      Physical Exam  Normal systems: cardiovascular, pulmonary and dental    Airway   Mallampati: II  TM distance: >3 FB  Neck ROM: full    Dental     Cardiovascular   Rhythm and rate: regular and normal      Pulmonary    breath sounds clear to auscultation          Anesthesia Plan      History & Physical Review  History and physical reviewed and following examination; no interval change.    ASA Status:  2 emergent.    NPO Status:  > 6 hours    Plan for General, ETT and RSI with Intravenous induction. Maintenance will be Balanced.    PONV prophylaxis:  Ondansetron (or other 5HT-3) and Dexamethasone or Solumedrol  GETA, Standard ASA monitoring  All available and pertinent medical records and test results reviewed.  Risks, including but not limited to airway injury, bronchospasm,  hypoxemia, PONV d/w patient, parents      Postoperative Care  Postoperative pain management:  Multi-modal analgesia.      Consents  Anesthetic plan, risks, benefits and alternatives discussed with:  Patient and Parent (Mother and/or Father).  Use of blood products discussed: No .   .

## 2018-01-02 NOTE — PLAN OF CARE
Problem: Patient Care Overview  Goal: Plan of Care/Patient Progress Review  Outcome: No Change  Pt to OR from 6964-4954 for appendectomy. AVSS before and after surgery. Oxycodone given x1 post-op for abdominal pain with good relief. Pt dressed and up walking, tolerating bites of a regular diet. Plan to discharge when meds arrive from pharmacy. Parents at bedside and updated on plan of care.

## 2018-01-02 NOTE — PROGRESS NOTES
Pediatric Surgery Progress note    S:  No acute overnight events.  Pt seen at bedside resting comfortably. Pain much improved. No nausea. Afebrile.      O:  /80  Pulse 115  Temp 98.4  F (36.9  C) (Oral)  Resp 18  Wt 60.2 kg (132 lb 11.5 oz)  LMP 11/27/2017 (Approximate)  SpO2 98%  BMI 22.96 kg/m2  A&Ox3, NAD  Breathing non-labored  RRR  Soft, NDNT  Distal extremities warm.         Intake/Output Summary (Last 24 hours) at 01/02/18 0657  Last data filed at 01/02/18 0500   Gross per 24 hour   Intake                0 ml   Output              900 ml   Net             -900 ml         BMP  Recent Labs  Lab 01/01/18  1941      POTASSIUM 3.7   CHLORIDE 104   FAYE 9.0   CO2 26   BUN 11   CR 0.73   GLC 87     CBC  Recent Labs  Lab 01/02/18  0531 01/01/18  1941   WBC 8.0 8.7   RBC 4.17 4.79   HGB 11.0* 12.7   HCT 33.5* 39.1   MCV 80 82   MCH 26.4* 26.5   MCHC 32.8 32.5   RDW 13.6 13.6    380     A/P: Mariposa Nunez is a 22 year old female with 3 day hx of abdominal pain, Work up concerning for early acute appendicitis. Currently improving on abx.   - Trial of PO this AM  - Follow AM labs  - Continue IV abx  - Plan to hold off on surgery given clinically improvement.   - If pain recurs or patient worsens will proceed with surgery this afternoon.     Seen and discussed with staff     Lily Wesley MD  PGY-2, General Surgery  [phone]    ADDENDUM    Patient had recurrent RLQ pain. Discussed benefits and risks of surgery vs observation. Currently, it's reasonable to proceed with surgery given failed conservative trial.     Exam:  Abd ttp over RLQ     Plan to proceed to OR for laparoscopic appendectomy this morning    Discussed with staff     Patient seen early in the morning, her pain did improve well, then recurred a few hours later. I discussed with her and her parents and we will proceed with a laparoscopic appendectomy. We discussed the risk and benefits.

## 2018-01-02 NOTE — PLAN OF CARE
Problem: Pain, Acute (Pediatric)  Goal: Identify Related Risk Factors and Signs and Symptoms  Related risk factors and signs and symptoms are identified upon initiation of Human Response Clinical Practice Guideline (CPG).  Outcome: Improving  VSS. Abdominal pain 7/10 at start of shift and decreased to 4/10 by end of shift. Ibuprofen admin x1. Good UO, IVF at 100 ml/hr. Pt NPO at 0000 for possible appendectomy today. Surgery in early this morning. Plan to wait and continue antibiotics to see if pain improves. Pt switched to clear liquids. Mom at bedside overnight, pt slept well. Hourly rounding complete. Will continue to monitor.

## 2018-01-02 NOTE — ED NOTES
3 day history of abdominal pain. Initially treated for UTI. Pain became worse while [place]. CT yesterday may indicate appendicitis.

## 2018-01-02 NOTE — OR NURSING
Pt doing well. Pain is managed with IV narcotics. Report given to tthe floor RN and plan to discharge from PACU at 1210. Mom and Dad are present. Pt has tolerated 1/4 popsicle.

## 2018-01-02 NOTE — ANESTHESIA POSTPROCEDURE EVALUATION
Patient: Mariposa Nunez    Procedure(s):  Laparoscopic appendectomy  - Wound Class: III-Contaminated    Diagnosis:appendicitis   Diagnosis Additional Information: No value filed.    Anesthesia Type:  General, ETT, RSI    Note:  Anesthesia Post Evaluation    Patient location during evaluation: Phase 2  Patient participation: Able to fully participate in evaluation  Level of consciousness: awake and alert  Pain management: adequate  Airway patency: patent  Cardiovascular status: hemodynamically stable  Respiratory status: spontaneous ventilation and room air  Hydration status: euvolemic  PONV: none             Last vitals:  Vitals:    01/02/18 1215 01/02/18 1229 01/02/18 1312   BP:  121/85 116/73   Pulse:      Resp:  16 16   Temp:  36.9  C (98.5  F) 36.7  C (98.1  F)   SpO2: 98%  99%         Electronically Signed By: Telma Painting MD  January 2, 2018  2:37 PM

## 2018-01-02 NOTE — DISCHARGE SUMMARY
Pediatric Surgery Discharge Summary    Mariposa Nunez MRN# [id number]   YOB: 1995 Age: 22 year old     Date of Admission:  1/1/2018  Date of Discharge::  1/2/2018  Admitting Physician:  Nura Hyman MD  Discharge Physician:  Nura Hyman  Primary Care Physician:        Nadine Villarreal          Admission Diagnoses:   Appendicitis [K37]          Discharge Diagnosis:   Same         Procedures:   Laparoscopic appendectomy         Non-operative procedures:   None performed          Consultations:   None         Medications Prior to Admission:     Prescriptions Prior to Admission   Medication Sig Dispense Refill Last Dose     drospirenone-ethinyl estradiol (HAKAN) 3-0.03 MG per tablet Take 1 tablet by mouth daily 84 tablet 3 1/1/2018 at 0900     methylphenidate (RITALIN) 5 MG tablet Take 1 tablet (5 mg) by mouth 2 times daily 60 tablet 0 Past Month at Unknown time     pantoprazole (PROTONIX) 40 MG EC tablet Take 1 tablet (40 mg) by mouth daily 30 tablet 4 1/1/2018 at 0900     EPINEPHrine (EPIPEN/ADRENACLICK/OR ANY BX GENERIC EQUIV) 0.3 MG/0.3ML injection 2-pack Inject 0.3 mLs (0.3 mg) into the muscle as needed for anaphylaxis 0.6 mL 11 Unknown at Unknown time            Discharge Medications:      Mariposa Nunez   Home Medication Instructions JANEY:[id number]    Printed on:01/02/18 2349   Medication Information                      acetaminophen (TYLENOL) 325 MG tablet  Take 2 tablets (650 mg) by mouth every 4 hours as needed for mild pain or fever             drospirenone-ethinyl estradiol (HAKAN) 3-0.03 MG per tablet  Take 1 tablet by mouth daily             EPINEPHrine (EPIPEN/ADRENACLICK/OR ANY BX GENERIC EQUIV) 0.3 MG/0.3ML injection 2-pack  Inject 0.3 mLs (0.3 mg) into the muscle as needed for anaphylaxis             ibuprofen (ADVIL/MOTRIN) 200 MG tablet  Take 2-3 tablets (400-600 mg) by mouth every 6 hours as needed for fever ((temp greater than 38.0C, 100.4F) or mild pain)              methylphenidate (RITALIN) 5 MG tablet  Take 1 tablet (5 mg) by mouth 2 times daily             oxyCODONE IR (ROXICODONE) 5 MG tablet  Take 1 tablet (5 mg) by mouth every 4 hours as needed for moderate to severe pain             pantoprazole (PROTONIX) 40 MG EC tablet  Take 1 tablet (40 mg) by mouth daily             polyethylene glycol (MIRALAX/GLYCOLAX) Packet  Take 17 g by mouth daily Use while taking oxycodone to avoid constipation             senna (SENOKOT) 8.6 MG tablet  Take 1 tablet by mouth daily as needed for constipation                       Day of Discharge Exam   /73  Pulse 115  Temp 98.1  F (36.7  C) (Oral)  Resp 16  Wt 60.2 kg (132 lb 11.5 oz)  LMP 11/27/2017 (Approximate)  SpO2 99%  BMI 22.96 kg/m2  General: Awake, alert, NAD  Cardio: RRR  Chest: NLB on RA  Abd: Soft, non-distended, appropriately TTP, incision c/d/i  Ext: WWP          Brief History of Illness:   21 yo F who presented with abdominal pain concerning for acute appendicitis            Hospital Course:   Patient was admitted and placed on IV antibiotics. Her pain initially improved; however, her pain recurred and after discussion with patient and family decision was made to proceed to OR. Post operatively patient was transferred to the general floor for post op cares.   On 1/2/18, they were felt to meet discharge criteria and were discharged to home with appropriate instructions and follow up.  They were tolerating diet, had full return of bowel and bladder function, and were ambulating independently.  The patient acknowledged understanding and were in agreement with the plan.         Antibiotics Prescribed at Discharge:   None prescribed           Imaging Studies:     Results for orders placed or performed during the hospital encounter of 01/01/18   US Abdomen Limited    Narrative    EXAMINATION: Appendix ultrasound 1/1/2018    CLINICAL HISTORY: Right lower quadrant abdominal pain.    COMPARISON: Outside CT  today.    FINDINGS:  The appendix is visualized.   Appendiceal diameter: Up to 7.4 mm, mildly enlarged.    Bowel loops in the right lower quadrant peristalse and are  compressible. No appendicolith, inflammatory change, or other findings  of appendicitis are visualized.  There are no abnormal fluid  collections.      Impression    IMPRESSION:   7.4 mm appendix, mildly enlarged. No free fluid or surrounding  inflamed mesenteric fat. Early appendicitis is not excluded.    Results discussed with Dr. Arce at 9:20 PM.    GEOFFREY ROSS MD            Final Pathology Result:   Pending at time of discharge         Discharge Instructions and Follow-Up:     Reason for your hospital stay   Patient was hospitalized for acute appendicitis     Follow Up and recommended labs and tests   Follow up with Dr. Hyman , at [facility], within 2-3 weeks  to evaluate after surgery. No follow up labs or test are needed.     Activity   Your activity upon discharge: activity as tolerated     Activity   Your activity upon discharge: return to activity gradually, no contact sports, heavy lifting, or strenuous exercise for 2-4 weeks. Mariposa may be excused from gym class and organized sports for 4 weeks or as directed at clinic follow up.     Wound care and dressings   Instructions to care for your wound at home: as directed  1. May start to shower on the 2nd day after surgery.  2. No scrubbing at incisions until completely healed. No soaking in the incision for prolonged period of time for 1 week.   3. Steri-strips will fall off on their own in 1-2 weeks.     Activity   Your activity upon discharge: activity as tolerated. No lifting restrictions.     Diet   Follow this diet upon discharge: Regular             Home Health Care:   Not needed           Discharge Disposition:   Discharged to home      Condition at discharge: Good    Lily Wesley MD  General Surgery, PGY-2  [phone].

## 2018-01-02 NOTE — ED PROVIDER NOTES
History     Chief Complaint   Patient presents with     Abdominal Pain     HPI    History obtained from mother, father and patient    Mariposa is a 22 year old female who presents at  7:00 PM with abdominal pain for 5 days.  Her symptoms began as dysuria and she was diagnosed with a urinary tract infection at an outside hospital.  Her pain persisted and then shifted to right lower quadrant pain so she was seen at another outside hospital where an abdominal pelvic CT was performed.  There was some concern for appendicitis clinically but her CT read as a 5 mm appendix so she was discharged home with supportive care.  Her pain is continued to worsen and upon review of her abdominal CT by 1 of our pediatric radiologist her appendix was 7 mm in size.  She complains of nausea but no vomiting.  She has right lower quadrant pain which worsens with walking.  She has ache in her back but no radiation of her abdominal pain and her pain initially migrated from the gregorio-umbilical area to the right lower quadrant.  She develops diarrhea but this was only after her IV and oral contrast from her abdominal CT exam.  She is currently taking birth control pills and is due for her next bleeding cycle however has not started it.  She denies sexual intercourse but admits to oral sex and does note some increased vaginal discharge which is not yellow and not malodorous.    PMHx:  History reviewed. No pertinent past medical history.  History reviewed. No pertinent surgical history.  These were reviewed with the patient/family.    MEDICATIONS were reviewed and are as follows:   Current Facility-Administered Medications   Medication     lidocaine 1 %     morphine (PF) injection 1-2 mg     Current Outpatient Prescriptions   Medication     drospirenone-ethinyl estradiol (HAKAN) 3-0.03 MG per tablet     EPINEPHrine (EPIPEN/ADRENACLICK/OR ANY BX GENERIC EQUIV) 0.3 MG/0.3ML injection 2-pack     methylphenidate (RITALIN) 5 MG tablet      pantoprazole (PROTONIX) 40 MG EC tablet       ALLERGIES:  Bees; Hpv vaccine recombinant (yeast derived) [quadrivalent hpv, 6,11,16,18]; and No clinical screening - see comments    IMMUNIZATIONS: Up-to-date by report.    SOCIAL HISTORY: Mariposa lives with her parents.  She does  attend college and recently returned from a trip to [facility].      I have reviewed the Medications, Allergies, Past Medical and Surgical History, and Social History in the Epic system.    Review of Systems  Please see HPI for pertinent positives and negatives.  All other systems reviewed and found to be negative.        Physical Exam   BP: 130/88  Pulse: 120  Temp: 98.4  F (36.9  C)  Resp: 16  Weight: 60.2 kg (132 lb 11.5 oz)  SpO2: 98 %      Physical Exam   Appearance: Alert and appropriate, well developed, nontoxic, with moist mucous membranes.  HEENT: Head: Normocephalic and atraumatic. Eyes: PERRL, EOM grossly intact, conjunctivae and sclerae clear. Ears: Tympanic membranes clear bilaterally, without inflammation or effusion. Nose: Nares clear with no active discharge.  Mouth/Throat: No oral lesions, pharynx clear with no erythema or exudate.  Neck: Supple, no masses, no meningismus. No significant cervical lymphadenopathy.  Pulmonary: No grunting, flaring, retractions or stridor. Good air entry, clear to auscultation bilaterally, with no rales, rhonchi, or wheezing.  Cardiovascular: Regular rate and rhythm, normal S1 and S2, with no murmurs.  Normal symmetric peripheral pulses and brisk cap refill.  Abdominal: Normal bowel sounds, soft, nontender, nondistended, with no masses and no hepatosplenomegaly.  Neurologic: Alert and oriented, cranial nerves II-XII grossly intact, moving all extremities equally with grossly normal coordination and normal gait.  Extremities/Back: No deformity, no CVA tenderness.  Skin: No significant rashes, ecchymoses, or lacerations.  Genitourinary: Deferred  Rectal: Deferred      ED Course     ED Course      Procedures    Results for orders placed or performed during the hospital encounter of 01/01/18 (from the past 24 hour(s))   CBC with platelets differential   Result Value Ref Range    WBC 8.7 4.0 - 11.0 10e9/L    RBC Count 4.79 3.8 - 5.2 10e12/L    Hemoglobin 12.7 11.7 - 15.7 g/dL    Hematocrit 39.1 35.0 - 47.0 %    MCV 82 78 - 100 fl    MCH 26.5 26.5 - 33.0 pg    MCHC 32.5 31.5 - 36.5 g/dL    RDW 13.6 10.0 - 15.0 %    Platelet Count 380 150 - 450 10e9/L    Diff Method Automated Method     % Neutrophils 67.4 %    % Lymphocytes 23.5 %    % Monocytes 8.3 %    % Eosinophils 0.2 %    % Basophils 0.5 %    % Immature Granulocytes 0.1 %    Nucleated RBCs 0 0 /100    Absolute Neutrophil 5.9 1.6 - 8.3 10e9/L    Absolute Lymphocytes 2.1 0.8 - 5.3 10e9/L    Absolute Monocytes 0.7 0.0 - 1.3 10e9/L    Absolute Eosinophils 0.0 0.0 - 0.7 10e9/L    Absolute Basophils 0.0 0.0 - 0.2 10e9/L    Abs Immature Granulocytes 0.0 0 - 0.4 10e9/L    Absolute Nucleated RBC 0.0    CRP inflammation   Result Value Ref Range    CRP Inflammation 8.0 0.0 - 8.0 mg/L   Comprehensive metabolic panel   Result Value Ref Range    Sodium 136 133 - 144 mmol/L    Potassium 3.7 3.4 - 5.3 mmol/L    Chloride 104 94 - 109 mmol/L    Carbon Dioxide 26 20 - 32 mmol/L    Anion Gap 6 3 - 14 mmol/L    Glucose 87 70 - 99 mg/dL    Urea Nitrogen 11 7 - 30 mg/dL    Creatinine 0.73 0.52 - 1.04 mg/dL    GFR Estimate >90 >60 mL/min/1.7m2    GFR Estimate If Black >90 >60 mL/min/1.7m2    Calcium 9.0 8.5 - 10.1 mg/dL    Bilirubin Total 0.3 0.2 - 1.3 mg/dL    Albumin 3.5 3.4 - 5.0 g/dL    Protein Total 8.6 6.8 - 8.8 g/dL    Alkaline Phosphatase 67 40 - 150 U/L    ALT 19 0 - 50 U/L    AST 16 0 - 45 U/L       Medications   lidocaine 1 % (  Not Given 1/1/18 2056)   morphine (PF) injection 1-2 mg (not administered)   0.9% sodium chloride BOLUS (1,000 mLs Intravenous New Bag 1/1/18 2013)       Old chart from [facility] reviewed, supported history as above.  Labs reviewed and normal,  pending urine studies as the patient has not been able to urinate at the time of this dictation.  Imaging reviewed and revealed enlarged appendix of 7.4 mm with no accompanying signs of inflammation or fluid.  Discussed imaging results with radiologist  Patient was attended to immediately upon arrival and assessed for immediate life-threatening conditions.  A consult was requested and obtained from surgery, who evaluated the patient in the ED.  History obtained from family.    Critical care time:  none      Assessments & Plan (with Medical Decision Making)     I have reviewed the nursing notes.    I have reviewed the findings, diagnosis, plan and need for follow up with the patient.  22-year-old female with right lower quadrant pain for the past 5 days.  She has normal white blood cell and CRP here however her appendix is enlarged on repeat ultrasound exam.  After discussion with our surgeons and the patient we elected to treat with IV antibiotics and repeat abdominal exam tomorrow to determine if surgical removal is necessary.  She does have a pending GC and chlamydia screen from her urine which will not resolve for the next 1-2 days.  I discussed with her the possibility of pelvic inflammatory disease although with her enlarged appendix it is likely not the cause of her current symptoms.  New Prescriptions    No medications on file       Final diagnoses:   Appendicitis       1/1/2018   [facility] EMERGENCY DEPARTMENT     Jonas Arce MD  01/01/18 1429

## 2018-01-02 NOTE — OP NOTE
DATE OF SERVICE:  01/02/2018      PREOPERATIVE DIAGNOSIS:  Acute appendicitis.      POSTOPERATIVE DIAGNOSIS:  Acute appendicitis.      PROCEDURE PERFORMED:  Laparoscopic appendectomy.      SURGEON:  Nura Hyman MD      RESIDENT SURGEON:  Billie Mason MD      ANESTHESIA:  General endotracheal.      ESTIMATED BLOOD LOSS:  4 mL      DRAINS:  None.      COMPLICATIONS:  None.      SPECIMENS:  Appendix.      OPERATIVE FINDINGS:     1.  Ms. Mariposa Nunez had an injected distal appendix that was mildly enlarged and more firm.  There was no apparent periappendiceal exudate or fibrin.     2.  The small bowel was ran over a length of about 6-8 feet and appeared grossly normal.  There is no evidence of creeping fat or lymphadenopathy or edema.   3.  There is no evidence of omphalomesenteric duct remnant.  The pelvis and ovaries appeared grossly normal.  There is no evidence of inguinal hernias.      OPERATIVE PROCEDURE:  This 22-year-old female has had some intermittent pain over the last several days began initially with some periumbilical pain localizing to deep right lower quadrant and had a CT showing enlarged appendix in that location confirmed with ultrasonography.  The patient was admitted briefly on IV antibiotics with improvement and then recurrence in her discomfort.  Risks and benefits of laparoscopic appendectomy were discussed in detail with her and her parents including but not limited to bleeding and infection and possible injury and the fact that on her antibiotics for previous UTI may have a partially treated early acute appendicitis.  They elected for laparoscopy.      After obtaining consent, she was brought to the operating room, underwent induction of anesthesia per Anesthesia Service.  After sufficient plane of anesthesia, she had a prep of her entire abdomen, draped in sterile fashion.  A small infraumbilical curvilinear incision was then made transversely after injecting with 0.25% bupivacaine.   The umbilical stalk was dissected and elevated up into the field.  A Veress needle was inserted without incident to the pneumoperitoneum to 15 mmHg instilled and a 1-step port placed without difficulty.  The scope was passed.  There is no evidence of any injury with port placement.  The appendix was seen in the right lower quadrant and omentum close by.  We then viewed the left lower quadrant and a site was chosen for second 5-mm port and the area was infiltrated with 0.25% bupivacaine and the port placed without difficulty and the umbilical port was increased to 12 mm.  A third port was placed to the left suprapubic region after again blocked with 0.25% bupivacaine.  Through these ports we were able to visualize the appendix that was injected on the tip and mildly inflamed and enlarged.  The small bowel was run over a length of about 6-8 feet.  There is no evidence of a Meckel's or omphalomesenteric duct remnant or any inflammation.  There is no gross disturbing lymphadenopathy.  There is no free fluid within the pelvis and the pelvic viscera all appeared normal as well.  The appendix was then elevated into the field.  A window opened in the mesoappendix.  The mesoappendix was taken with a fire of the laparoscopic stapler and was hemostatic.  There were a couple drops coming from the staple line.  This was controlled with the cautery, one small burst.  The appendix was then transected with a second fire brought out through the umbilical port.  Did irrigate the wound slightly with saline and confirmed hemostasis and aspirated all the irrigation from her pelvis and abdomen and confirmed hemostasis a second time concluded the operation.  Pneumoperitoneum was then released and all the ports were removed.  Umbilical fascia was reapproximated with figure-of-eight Vicryl.  Additional bupivacaine placed in the umbilical wound and the skin edges closed at all 3 wounds with Monocryl and dressed with benzoin and Steri-Strips.   She was awoken from anesthesia and taken to recovery room in stable condition.  All sponge and needle counts were correct x2.         NEREYDA BARRAGAN MD             D: 2018 13:00   T: 2018 13:23   MT: DEION      Name:     VIOLETA ZAMUDIO   MRN:      [id number]        Account:        [id number]   :      1995           Procedure Date: 2018      Document: [id number]       cc: Nadine Villarreal MD       [facility] Billing

## 2018-01-03 ENCOUNTER — CARE COORDINATION (OUTPATIENT)
Dept: CARE COORDINATION | Facility: CLINIC | Age: 23
End: 2018-01-03

## 2018-01-03 NOTE — PROGRESS NOTES
Clinic Care Coordination Contact  Care Coordination Communication    Clinical Data: Patient was hospitalized at [facility] from 01/01/2018 to 01/02/2018 with diagnosis of Appendicitis (s/p laparoscopic appendectomy).     Please refer to 01/02/2018 CTS - see letters tab.     Per 01/02/2018 Discharge Summaries by Dr. Nura Hyman - Surgeon:       Discharge Instructions and Follow-Up:      Reason for your hospital stay   Patient was hospitalized for acute appendicitis      Follow Up and recommended labs and tests   Follow up with Dr. Hyman , at [facility], within 2-3 weeks  to evaluate after surgery. No follow up labs or test are needed.      Activity   Your activity upon discharge: activity as tolerated      Activity   Your activity upon discharge: return to activity gradually, no contact sports, heavy lifting, or strenuous exercise for 2-4 weeks. Mariposa may be excused from gym class and organized sports for 4 weeks or as directed at clinic follow up.      Wound care and dressings   Instructions to care for your wound at home: as directed  1. May start to shower on the 2nd day after surgery.  2. No scrubbing at incisions until completely healed. No soaking in the incision for prolonged period of time for 1 week.   3. Steri-strips will fall off on their own in 1-2 weeks.      Activity   Your activity upon discharge: activity as tolerated. No lifting restrictions.      Diet   Follow this diet upon discharge: Regular                  Home Health Care:    Not needed               Discharge Disposition:    Discharged to home      Condition at discharge: Good  -------------------------------------------------------------------------------------------------------------------------------------------------------------------------------------------------    Patient Contact:               Introduced self and role of care coordination.               Discharge instructions were reviewed with patient/caregiver.                Do you have any questions about your medications? No              Follow up appointment is scheduled for 01/18/2018 - Dr. Nura Hyman (Post-Hospital F/U - [facility]) and 01/15/2018 - PCP (medication check).              Patient questions/concerns: Patient denies any current s/s of concern, questions or needs from writer at this time.    No ongoing care coordination needs identified by writer.     Plan: Care Coordinator will mail out care coordination introduction letter with care coordinator contact information and explanation of care coordination services. Care Coordinator will do no further outreaches at this time.  Placing on INACTIVE status at this time.     Adilia Mosquera, RN  [facility]  Clinic Care Coordinator - [place] and [place] Locations   Direct:  [phone] (voicemail available)

## 2018-01-03 NOTE — PLAN OF CARE
Problem: Patient Care Overview  Goal: Plan of Care/Patient Progress Review  Outcome: Improving  Afeb, VSS.  Patient c/o pain of 3/10, but declined interventions.  Patient has adequate pain control on oral pain medications.  Tolerating PO.  Urinating well.  Goals met for DC.  PIV was removed.  Reviewed discharge instructions with patient and family.  They verbalized understanding of DC instructions and felt comfortable going home.  DC to home at 1645.

## 2018-01-04 LAB — COPATH REPORT: NORMAL

## 2018-01-06 ENCOUNTER — OFFICE VISIT (OUTPATIENT)
Dept: URGENT CARE | Facility: URGENT CARE | Age: 23
End: 2018-01-06
Payer: COMMERCIAL

## 2018-01-06 ENCOUNTER — NURSE TRIAGE (OUTPATIENT)
Dept: NURSING | Facility: CLINIC | Age: 23
End: 2018-01-06

## 2018-01-06 VITALS
DIASTOLIC BLOOD PRESSURE: 66 MMHG | SYSTOLIC BLOOD PRESSURE: 114 MMHG | OXYGEN SATURATION: 97 % | TEMPERATURE: 98.6 F | HEART RATE: 98 BPM

## 2018-01-06 DIAGNOSIS — N76.0 VAGINITIS AND VULVOVAGINITIS: Primary | ICD-10-CM

## 2018-01-06 DIAGNOSIS — N92.6 IRREGULAR MENSES: ICD-10-CM

## 2018-01-06 LAB
SPECIMEN SOURCE: ABNORMAL
WET PREP SPEC: ABNORMAL

## 2018-01-06 PROCEDURE — 87210 SMEAR WET MOUNT SALINE/INK: CPT | Performed by: FAMILY MEDICINE

## 2018-01-06 PROCEDURE — 99213 OFFICE O/P EST LOW 20 MIN: CPT | Performed by: FAMILY MEDICINE

## 2018-01-06 NOTE — TELEPHONE ENCOUNTER
"\"Should she be seen? Can she take ibuprofen with fluconazole?\"   got on the phone and said he's a Peds surgeon and their protocol is to be referred to primary clinic, not on call surgeon. So, then he thought they would take her to the [facility] urgent care instead of paging out the on call MD. I told him that is fine. He checked with his wife on that and she was fine with that. His wife thinks the vaginal bleeding is from break through bleeding being that there were some changes to the birth control regimen.  Sandra Courtney RN-[facility]    "

## 2018-01-06 NOTE — NURSING NOTE
"Chief Complaint   Patient presents with     Urgent Care     Vaginal Bleeding     Patient is having some light vaginal bleeding for 24 hours. She takes her BCP continually to skip her periods. She just had an appendectomy 1-2-18. LMP .       Initial /66 (BP Location: Right arm, Patient Position: Chair, Cuff Size: Adult Regular)  Pulse 98  Temp 98.6  F (37  C) (Oral)  LMP 11/27/2017 (Approximate)  SpO2 97% Estimated body mass index is 22.96 kg/(m^2) as calculated from the following:    Height as of 12/22/17: 5' 3.75\" (1.619 m).    Weight as of 1/1/18: 132 lb 11.5 oz (60.2 kg)..  BP completed using cuff size: regular  S VANESSA, SADE    "

## 2018-01-06 NOTE — PROGRESS NOTES
SUBJECTIVE:   Mariposa Nunez is a 22 year old female who  presents today for vaginal itchiness and irregular vaginal bleeding.    Patient was in [facility] when developed abdominal pain, flew back home, ended up in ER with appendicitis diagnosis, had appendectomy on 1/2.  Patient took diflucan last night, prescribed by health care professional in family.  Patient had undergone STD screen and UA when in hospital, all was negative.  Had BM yesterday.  Stopped oxycodone, currently on ibuprofen and tylenol only.  Had follow up with surgeon in 2 weeks.    Patient had menses end of November.  Patient has been on OCP for couple of years, was taking OCP thru December as planning on trip and did not want to have her menses, currently on middle of pack.  Has not had problems with spotting before, did miss only 1 day of her pills.  Patient had vaginitis symptoms before, recently seen by OB/GYN and had uncomfortable exam.        No past medical history on file.  Current Outpatient Prescriptions   Medication Sig Dispense Refill     Fluconazole (DIFLUCAN PO)        acetaminophen (TYLENOL) 325 MG tablet Take 2 tablets (650 mg) by mouth every 4 hours as needed for mild pain or fever 1 Bottle 1     ibuprofen (ADVIL/MOTRIN) 200 MG tablet Take 2-3 tablets (400-600 mg) by mouth every 6 hours as needed for fever ((temp greater than 38.0C, 100.4F) or mild pain) 100 tablet 1     drospirenone-ethinyl estradiol (HAKAN) 3-0.03 MG per tablet Take 1 tablet by mouth daily 84 tablet 3     pantoprazole (PROTONIX) 40 MG EC tablet Take 1 tablet (40 mg) by mouth daily 30 tablet 4     senna (SENOKOT) 8.6 MG tablet Take 1 tablet by mouth daily as needed for constipation (Patient not taking: Reported on 1/6/2018) 30 tablet 0     oxyCODONE IR (ROXICODONE) 5 MG tablet Take 1 tablet (5 mg) by mouth every 4 hours as needed for moderate to severe pain (Patient not taking: Reported on 1/6/2018) 12 tablet 0     polyethylene glycol (MIRALAX/GLYCOLAX) Packet  Take 17 g by mouth daily Use while taking oxycodone to avoid constipation (Patient not taking: Reported on 1/6/2018) 7 packet 1     EPINEPHrine (EPIPEN/ADRENACLICK/OR ANY BX GENERIC EQUIV) 0.3 MG/0.3ML injection 2-pack Inject 0.3 mLs (0.3 mg) into the muscle as needed for anaphylaxis (Patient not taking: Reported on 1/6/2018) 0.6 mL 11     methylphenidate (RITALIN) 5 MG tablet Take 1 tablet (5 mg) by mouth 2 times daily (Patient not taking: Reported on 1/6/2018) 60 tablet 0     Social History   Substance Use Topics     Smoking status: Never Smoker     Smokeless tobacco: Never Used     Alcohol use No       ROS:   CONSTITUTIONAL:NEGATIVE for fever, chills, change in weight  ENT/MOUTH: NEGATIVE for ear, mouth and throat problems  RESP:NEGATIVE for significant cough or SOB  CV: NEGATIVE for chest pain, palpitations or peripheral edema  GI: NEGATIVE for nausea, abdominal pain, heartburn, or change in bowel habits  : abnormal menstrual cycles and vaginal discharge    OBJECTIVE:  /66 (BP Location: Right arm, Patient Position: Chair, Cuff Size: Adult Regular)  Pulse 98  Temp 98.6  F (37  C) (Oral)  LMP 11/27/2017 (Approximate)  SpO2 97%  GENERAL APPEARANCE: healthy, alert and no distress  ABDOMEN:  soft, nontender, no HSM or masses and bowel sounds normal  SKIN: no suspicious lesions or rashes  PSYCH: mentation appears normal and affect normal/bright    Results for orders placed or performed in visit on 01/06/18   Wet prep   Result Value Ref Range    Specimen Description Vagina     Wet Prep No Trichomonas seen     Wet Prep No clue cells seen     Wet Prep Yeast seen (A)        ASSESSMENT/PLAN:   (N76.0) Vaginitis and vulvovaginitis  (primary encounter diagnosis)  Comment: yeast  Plan: Wet prep            (N92.6) Irregular menses  Plan: continue with current OCP      Patient has already taken diflucan, okay to use OTC vaginal cream for yeast to help with symptoms.  Okay to repeat diflucan in 1 week if symptoms  still not fully resolved.  Reviewed that yeast is common after antibiotic use.    Discussed irregular spotting, currently on OCP and recommend to continue with this until the package is finished.  Due to recent stressors and concurrent yeast vaginitis that this is reasonable to happen.  Monitor symptoms for now.    Return to clinic if no resolution of symptoms.    Yazan Norris MD  January 6, 2018 12:53 PM

## 2018-01-06 NOTE — MR AVS SNAPSHOT
"              After Visit Summary   1/6/2018    Mariposa Nunez    MRN: [id number]           Patient Information     Date Of Birth          1995        Visit Information        Provider Department      1/6/2018 10:00 AM Yazan Norris MD [facility]        Today's Diagnoses     Vaginitis and vulvovaginitis    -  1    Irregular menses           Follow-ups after your visit        Follow-up notes from your care team     Return if symptoms worsen or fail to improve.      Your next 10 appointments already scheduled     Frank 15, 2018  2:10 PM CST   SHORT with Nadine Villarreal MD   [facility]    [address]  [address]  [place] [address] [phone]            Jan 18, 2018  2:45 PM CST   Return Visit with Nura Hyman MD   Peds Surgery ([facility])    [facility]  [address]  [address] [facility] [place] [facility] [id number] [phone]              Who to contact     If you have questions or need follow up information about today's clinic visit or your schedule please contact [facility] directly at [phone].  Normal or non-critical lab and imaging results will be communicated to you by MyChart, letter or phone within 4 business days after the clinic has received the results. If you do not hear from us within 7 days, please contact the clinic through MyChart or phone. If you have a critical or abnormal lab result, we will notify you by phone as soon as possible.  Submit refill requests through [employer] or call your pharmacy and they will forward the refill request to us. Please allow 3 business days for your refill to be completed.          Additional Information About Your Visit        MyChart Information     [employer] lets you send messages to your doctor, view your test results, renew your prescriptions, schedule appointments and more. To sign up, go to [email or web address] . Click on \"Log in\" on the " "left side of the screen, which will take you to the Welcome page. Then click on \"Sign up Now\" on the right side of the page.     You will be asked to enter the access code listed below, as well as some personal information. Please follow the directions to create your username and password.     Your access code is: [email or web address]  Expires: 2018  6:57 PM     Your access code will  in 90 days. If you need help or a new code, please call your [place] [place] clinic or [phone].        Care EveryWhere ID     This is your Care EveryWhere ID. This could be used by other organizations to access your [place] [place] medical records  [id number]        Your Vitals Were     Pulse Temperature Last Period Pulse Oximetry          98 98.6  F (37  C) (Oral) 2017 (Approximate) 97%         Blood Pressure from Last 3 Encounters:   18 114/66   18 116/72   17 112/76    Weight from Last 3 Encounters:   18 132 lb 11.5 oz (60.2 kg)   17 139 lb 11.2 oz (63.4 kg)   17 139 lb 1.6 oz (63.1 kg)              We Performed the Following     Wet prep        Primary Care Provider Office Phone # Fax #    Nadine Villarreal [phone] [address] [place] [address]        Equal Access to Services     [facility] AH: Hadii aad ku hadasho Soomaali, waaxda luqadaha, qaybta kaalmada adeanthonyyada, abi brantley . So [facility] [phone].    ATENCIÓN: Si habla español, tiene a acosta disposición servicios gratuitos de asistencia lingüística. Llame al [phone].    We comply with applicable federal civil rights laws and [place] laws. We do not discriminate on the basis of race, color, national origin, age, disability, sex, sexual orientation, or gender identity.            Thank you!     Thank you for choosing [facility]  for your care. Our goal is always to provide you with excellent care. Hearing back from our patients is one way we can " continue to improve our services. Please take a few minutes to complete the written survey that you may receive in the mail after your visit with us. Thank you!             Your Updated Medication List - Protect others around you: Learn how to safely use, store and throw away your medicines at www.disposemymeds.org.          This list is accurate as of: 1/6/18 12:54 PM.  Always use your most recent med list.                   Brand Name Dispense Instructions for use Diagnosis    acetaminophen 325 MG tablet    TYLENOL    1 Bottle    Take 2 tablets (650 mg) by mouth every 4 hours as needed for mild pain or fever    Acute post-operative pain       DIFLUCAN PO           drospirenone-ethinyl estradiol 3-0.03 MG per tablet    HAKAN    84 tablet    Take 1 tablet by mouth daily    PMDD (premenstrual dysphoric disorder)       EPINEPHrine 0.3 MG/0.3ML injection 2-pack    EPIPEN/ADRENACLICK/or ANY BX GENERIC EQUIV    0.6 mL    Inject 0.3 mLs (0.3 mg) into the muscle as needed for anaphylaxis    History of bee sting allergy       ibuprofen 200 MG tablet    ADVIL/MOTRIN    100 tablet    Take 2-3 tablets (400-600 mg) by mouth every 6 hours as needed for fever ((temp greater than 38.0C, 100.4F) or mild pain)    Acute post-operative pain       methylphenidate 5 MG tablet    RITALIN    60 tablet    Take 1 tablet (5 mg) by mouth 2 times daily    ADHD (attention deficit hyperactivity disorder), inattentive type       oxyCODONE IR 5 MG tablet    ROXICODONE    12 tablet    Take 1 tablet (5 mg) by mouth every 4 hours as needed for moderate to severe pain    Acute post-operative pain, S/P laparoscopic appendectomy       pantoprazole 40 MG EC tablet    PROTONIX    30 tablet    Take 1 tablet (40 mg) by mouth daily    ADHD (attention deficit hyperactivity disorder), inattentive type       polyethylene glycol Packet    MIRALAX/GLYCOLAX    7 packet    Take 17 g by mouth daily Use while taking oxycodone to avoid constipation    S/P  laparoscopic appendectomy       senna 8.6 MG tablet    SENOKOT    30 tablet    Take 1 tablet by mouth daily as needed for constipation    Appendicitis, unspecified appendicitis type

## 2018-01-15 ENCOUNTER — OFFICE VISIT (OUTPATIENT)
Dept: PEDIATRICS | Facility: CLINIC | Age: 23
End: 2018-01-15
Payer: COMMERCIAL

## 2018-01-15 VITALS
DIASTOLIC BLOOD PRESSURE: 68 MMHG | BODY MASS INDEX: 22.65 KG/M2 | OXYGEN SATURATION: 100 % | SYSTOLIC BLOOD PRESSURE: 106 MMHG | TEMPERATURE: 98.3 F | WEIGHT: 132.7 LBS | HEIGHT: 64 IN | HEART RATE: 82 BPM

## 2018-01-15 DIAGNOSIS — Z23 NEED FOR DIPHTHERIA-TETANUS-PERTUSSIS (TDAP) VACCINE: ICD-10-CM

## 2018-01-15 DIAGNOSIS — R12 CHRONIC HEARTBURN: ICD-10-CM

## 2018-01-15 DIAGNOSIS — D50.9 IRON DEFICIENCY ANEMIA, UNSPECIFIED IRON DEFICIENCY ANEMIA TYPE: Primary | ICD-10-CM

## 2018-01-15 DIAGNOSIS — F90.0 ADHD (ATTENTION DEFICIT HYPERACTIVITY DISORDER), INATTENTIVE TYPE: ICD-10-CM

## 2018-01-15 LAB
DIFFERENTIAL METHOD BLD: ABNORMAL
EOSINOPHIL # BLD AUTO: 0.2 10E9/L (ref 0–0.7)
EOSINOPHIL NFR BLD AUTO: 2 %
ERYTHROCYTE [DISTWIDTH] IN BLOOD BY AUTOMATED COUNT: 13.9 % (ref 10–15)
HCT VFR BLD AUTO: 35.6 % (ref 35–47)
HGB BLD-MCNC: 12.2 G/DL (ref 11.7–15.7)
LYMPHOCYTES # BLD AUTO: 3.9 10E9/L (ref 0.8–5.3)
LYMPHOCYTES NFR BLD AUTO: 44 %
MCH RBC QN AUTO: 26.3 PG (ref 26.5–33)
MCHC RBC AUTO-ENTMCNC: 34.3 G/DL (ref 31.5–36.5)
MCV RBC AUTO: ABNORMAL FL (ref 78–100)
MONOCYTES # BLD AUTO: 0.7 10E9/L (ref 0–1.3)
MONOCYTES NFR BLD AUTO: 8 %
NEUTROPHILS # BLD AUTO: 4 10E9/L (ref 1.6–8.3)
NEUTROPHILS NFR BLD AUTO: 46 %
PLATELET # BLD AUTO: 397 10E9/L (ref 150–450)
RBC # BLD AUTO: 4.63 10E12/L (ref 3.8–5.2)
RETICS # AUTO: 40.3 10E9/L (ref 25–95)
RETICS/RBC NFR AUTO: 0.9 % (ref 0.5–2)
WBC # BLD AUTO: 8.8 10E9/L (ref 4–11)

## 2018-01-15 PROCEDURE — 36415 COLL VENOUS BLD VENIPUNCTURE: CPT | Performed by: INTERNAL MEDICINE

## 2018-01-15 PROCEDURE — 90471 IMMUNIZATION ADMIN: CPT | Performed by: INTERNAL MEDICINE

## 2018-01-15 PROCEDURE — 85025 COMPLETE CBC W/AUTO DIFF WBC: CPT | Performed by: INTERNAL MEDICINE

## 2018-01-15 PROCEDURE — 90715 TDAP VACCINE 7 YRS/> IM: CPT | Performed by: INTERNAL MEDICINE

## 2018-01-15 PROCEDURE — 99214 OFFICE O/P EST MOD 30 MIN: CPT | Mod: 25 | Performed by: INTERNAL MEDICINE

## 2018-01-15 PROCEDURE — 85060 BLOOD SMEAR INTERPRETATION: CPT | Performed by: INTERNAL MEDICINE

## 2018-01-15 PROCEDURE — 85045 AUTOMATED RETICULOCYTE COUNT: CPT | Performed by: INTERNAL MEDICINE

## 2018-01-15 RX ORDER — METHYLPHENIDATE HYDROCHLORIDE 5 MG/1
2.5-5 TABLET ORAL 2 TIMES DAILY
Qty: 30 TABLET | Refills: 0 | Status: SHIPPED | OUTPATIENT
Start: 2018-01-15 | End: 2018-08-20

## 2018-01-15 NOTE — PATIENT INSTRUCTIONS
I've requested upper GI endoscopy with Dr. Sarkar.  If that looks fine, I'd like to have you follow up with Dr. Sarkar and potentially see a hematologist.    If your iron levels improve on iron replacement, once they are normal (3-6 months), we should take you off the iron. If the levels continue to drop, we definitely need to have you follow up with Dr. Sarkar.    Take iron every day in addition to your multivitamin.  Check iron tests in March. See me after that.    Also could consider setting up appointment with hematology in March after labs. If we don't have other answers by march, they may be able to help give us answers.     I think we should consider GeneSight testing for the ADHD medications.  We could think about the daytrana patch, but the lowest dose is 10 mg.   The last option is to have you see Candice Arrington, our psychiatric nurse practitioner for medication management if we can't get great control of your symptoms without significant side effects.

## 2018-01-15 NOTE — PROGRESS NOTES
"  SUBJECTIVE:   Mariposa Nunez is a 22 year old female who presents to clinic today for the following health issues:      Medication Followup of ritalin    Taking Medication as prescribed: yes    Side Effects:  None    Medication Helping Symptoms:  yes     Discuss menstral cycle, has been bleeding since 1/5/18. Had skipped a period because she was going to [facility], now having spotting. Now having her real period. Usually periods are \"light\" changes a pad once or twice a day. No blood in stool, no diarrhea.     OTC iron made her constipated. Switched to OTC multivitamin. One a day women's formula. 18 mg iron. Still having nearly daily heartburn. Takes daily pantoprazole. Dr. Sarkar had wanted to do EGD if symptoms did not resolve. Feels like a burning hold in mid chest.     Problem list and histories reviewed & adjusted, as indicated.  Additional history: as documented    Patient Active Problem List   Diagnosis     Anxiety state     History of bee sting allergy     ADHD (attention deficit hyperactivity disorder), inattentive type     Syncope, unspecified syncope type     Appendicitis     Past Surgical History:   Procedure Laterality Date     LAPAROSCOPIC APPENDECTOMY N/A 1/2/2018    Procedure: LAPAROSCOPIC APPENDECTOMY;  Laparoscopic appendectomy ;  Surgeon: Nura Hyman MD;  Location:  OR       Social History   Substance Use Topics     Smoking status: Never Smoker     Smokeless tobacco: Never Used     Alcohol use No     Family History   Problem Relation Age of Onset     Neurologic Disorder Other      grandmother, aunts and uncles     Asthma Mother      Hypertension Mother      Asthma Sister      Congenital Anomalies Sister      Hypertension Other      also grandparents and uncle     Lipids Other      grandparents     Family History Negative Father      Lupus Maternal Aunt          Current Outpatient Prescriptions   Medication Sig Dispense Refill     ferrous sulfate (SLO-FE) 142 (45 FE) MG TBCR Take 1 " "tablet (142 mg) by mouth daily 100 tablet 1     methylphenidate (RITALIN) 5 MG tablet Take 0.5-1 tablets (2.5-5 mg) by mouth 2 times daily 30 tablet 0     acetaminophen (TYLENOL) 325 MG tablet Take 2 tablets (650 mg) by mouth every 4 hours as needed for mild pain or fever 1 Bottle 1     ibuprofen (ADVIL/MOTRIN) 200 MG tablet Take 2-3 tablets (400-600 mg) by mouth every 6 hours as needed for fever ((temp greater than 38.0C, 100.4F) or mild pain) 100 tablet 1     drospirenone-ethinyl estradiol (HAKAN) 3-0.03 MG per tablet Take 1 tablet by mouth daily 84 tablet 3     EPINEPHrine (EPIPEN/ADRENACLICK/OR ANY BX GENERIC EQUIV) 0.3 MG/0.3ML injection 2-pack Inject 0.3 mLs (0.3 mg) into the muscle as needed for anaphylaxis 0.6 mL 11     pantoprazole (PROTONIX) 40 MG EC tablet Take 1 tablet (40 mg) by mouth daily 30 tablet 4     methylphenidate (RITALIN) 5 MG tablet Take 1 tablet (5 mg) by mouth 2 times daily (Patient not taking: Reported on 1/6/2018) 60 tablet 0         Reviewed and updated as needed this visit by clinical staff     Reviewed and updated as needed this visit by Provider       ROS:  Constitutional, HEENT, cardiovascular, pulmonary, GI, , musculoskeletal, neuro, skin, endocrine and psych systems are negative, except as otherwise noted.      OBJECTIVE:   /68 (Cuff Size: Adult Regular)  Pulse 82  Temp 98.3  F (36.8  C) (Oral)  Ht 5' 3.75\" (1.619 m)  Wt 132 lb 11.2 oz (60.2 kg)  LMP 01/12/2018  SpO2 100%  BMI 22.96 kg/m2  Body mass index is 22.96 kg/(m^2).  GENERAL: healthy, alert and no distress  NECK: no adenopathy, no asymmetry, masses, or scars and thyroid normal to palpation  RESP: lungs clear to auscultation - no rales, rhonchi or wheezes  CV: regular rate and rhythm, normal S1 S2, no S3 or S4, no murmur, click or rub, no peripheral edema and peripheral pulses strong  ABDOMEN: soft, nontender, no hepatosplenomegaly, no masses and bowel sounds normal, surgical incision sites " clean/dry/intact  MS: no gross musculoskeletal defects noted, no edema    Diagnostic Test Results:  Results for orders placed or performed in visit on 01/15/18   CBC with platelets differential   Result Value Ref Range    WBC 8.8 4.0 - 11.0 10e9/L    RBC Count 4.63 3.8 - 5.2 10e12/L    Hemoglobin 12.2 11.7 - 15.7 g/dL    Hematocrit 35.6 35.0 - 47.0 %    MCV Reviewed: OK with previous 78 - 100 fl    MCH 26.3 (L) 26.5 - 33.0 pg    MCHC 34.3 31.5 - 36.5 g/dL    RDW 13.9 10.0 - 15.0 %    Platelet Count 397 150 - 450 10e9/L    Diff Method PENDING        ASSESSMENT/PLAN:     1. Iron deficiency anemia, unspecified iron deficiency anemia type  No prior anemia until this year. On OC for 3 years. Has not been taking her iron replacement at all due to constipation. Did recently get women's MVI but only has 18 mg elemental iron.   Etiology at this point unclear. Does not appear to be menstrual, and stool tests negative for blood. Does have chronic heartburn symptoms, unresolved with PPI. Had previously seen Dr. Sarkar, who suggested EGD if not better. Since iron deficiency persists, and heartburn persists, ordered EGD. Hoping to have this done before she goes back to college in 9 days.   Plan periph smear today to confirm consistent with iron deficiency anemia. Discussed option to follow up with heme. Could schedule for spring break, or if smear looks OK and anemia improved in march, could hold off.   - ferrous sulfate (SLO-FE) 142 (45 FE) MG TBCR; Take 1 tablet (142 mg) by mouth daily  Dispense: 100 tablet; Refill: 1  - GASTROENTEROLOGY ADULT REF CONSULT ONLY  - **Iron and iron binding capacity FUTURE anytime; Future  - **CBC with platelets FUTURE anytime; Future  - Blood Morphology Pathologist Review  - CBC with platelets differential  - Reticulocyte Count  - ONC/HEME ADULT REFERRAL    2. Chronic heartburn  See above  Repeat EGD.   - GASTROENTEROLOGY ADULT REF PROCEDURE ONLY    3. ADHD (attention deficit hyperactivity  disorder), inattentive type  Doesn't do well if takes more than 5 mg ritalin in a day. Discussed again option to do genesight testing. She will discuss with her parents. For now will switch to 2.5 mg bid.   - methylphenidate (RITALIN) 5 MG tablet; Take 0.5-1 tablets (2.5-5 mg) by mouth 2 times daily  Dispense: 30 tablet; Refill: 0    4. Need for diphtheria-tetanus-pertussis (Tdap) vaccine  - TDAP VACCINE (ADACEL)    Patient Instructions   I've requested upper GI endoscopy with Dr. Sarkar.  If that looks fine, I'd like to have you follow up with Dr. Sarkar and potentially see a hematologist.    If your iron levels improve on iron replacement, once they are normal (3-6 months), we should take you off the iron. If the levels continue to drop, we definitely need to have you follow up with Dr. Sarkar.    Take iron every day in addition to your multivitamin.  Check iron tests in March. See me after that.    Also could consider setting up appointment with hematology in March after labs. If we don't have other answers by march, they may be able to help give us answers.     I think we should consider GeneSight testing for the ADHD medications.  We could think about the daytrana patch, but the lowest dose is 10 mg.   The last option is to have you see Candice Arrington, our psychiatric nurse practitioner for medication management if we can't get great control of your symptoms without significant side effects.      Nadine Ayon MD  [facility]

## 2018-01-15 NOTE — MR AVS SNAPSHOT
After Visit Summary   1/15/2018    Mariposa Nunez    MRN: [id number]           Patient Information     Date Of Birth          1995        Visit Information        Provider Department      1/15/2018 2:10 PM Nadine Villarreal MD [facility]        Today's Diagnoses     Chronic heartburn    -  1    Need for diphtheria-tetanus-pertussis (Tdap) vaccine        Iron deficiency anemia, unspecified iron deficiency anemia type        ADHD (attention deficit hyperactivity disorder), inattentive type          Care Instructions    I've requested upper GI endoscopy with Dr. Sarkar.  If that looks fine, I'd like to have you follow up with Dr. Sarkar and potentially see a hematologist.    If your iron levels improve on iron replacement, once they are normal (3-6 months), we should take you off the iron. If the levels continue to drop, we definitely need to have you follow up with Dr. Sarkar.    Take iron every day in addition to your multivitamin.  Check iron tests in March. See me after that.    Also could consider setting up appointment with hematology in March after labs. If we don't have other answers by march, they may be able to help give us answers.     I think we should consider GeneSight testing for the ADHD medications.  We could think about the daytrana patch, but the lowest dose is 10 mg.   The last option is to have you see Candice Arrington, our psychiatric nurse practitioner for medication management if we can't get great control of your symptoms without significant side effects.          Follow-ups after your visit        Additional Services     GASTROENTEROLOGY ADULT REF CONSULT ONLY       Preferred Location: [facility] [phone]      Please be aware that coverage of these services is subject to the terms and limitations of your health insurance plan.  Call member services at your health plan with any benefit or coverage questions.  Any procedures must be performed at a [facility]  OR coordinated by your clinic's referral office.    Please bring the following with you to your appointment:    (1) Any X-Rays, CTs or MRIs which have been performed.  Contact the facility where they were done to arrange for  prior to your scheduled appointment.    (2) List of current medications   (3) This referral request   (4) Any documents/labs given to you for this referral            GASTROENTEROLOGY ADULT REF PROCEDURE ONLY       Last Lab Result: Creatinine (mg/dL)       Date                     Value                 01/01/2018               0.73             ----------  Body mass index is 22.96 kg/(m^2).      Patient will be contacted to schedule procedure.     Please be aware that coverage of these services is subject to the terms and limitations of your health insurance plan.  Call member services at your health plan with any benefit or coverage questions.  Any procedures must be performed at a [place] facility OR coordinated by your clinic's referral office.    Please bring the following with you to your appointment:    (1) Any X-Rays, CTs or MRIs which have been performed.  Contact the facility where they were done to arrange for  prior to your scheduled appointment.    (2) List of current medications   (3) This referral request   (4) Any documents/labs given to you for this referral            ONC/HEME ADULT REFERRAL       Your provider has referred you to: [facility]: [facility] [place] [phone]   [email or web address]    Please be aware that coverage of these services is subject to the terms and limitations of your health insurance plan.  Call member services at your health plan with any benefit or coverage questions.      Please bring the following with you to your appointment:    (1) Any X-Rays, CTs or MRIs which have been performed.  Contact the facility where they were done to arrange for  prior to your scheduled  appointment.   (2) List of current medications  (3) This referral request   (4) Any documents/labs given to you for this referral                  Your next 10 appointments already scheduled     Jan 18, 2018  2:45 PM CST   Return Visit with Nura Hyman MD   Peds Surgery ([facility])    [facility]  [address]  [address] [facility] [address]   [phone]              Future tests that were ordered for you today     Open Future Orders        Priority Expected Expires Ordered    **Iron and iron binding capacity FUTURE anytime Routine 1/15/2018 1/15/2019 1/15/2018    **CBC with platelets FUTURE anytime Routine 1/15/2018 1/15/2019 1/15/2018            Who to contact     If you have questions or need follow up information about today's clinic visit or your schedule please contact [facility] directly at [phone].  Normal or non-critical lab and imaging results will be communicated to you by MyChart, letter or phone within 4 business days after the clinic has received the results. If you do not hear from us within 7 days, please contact the clinic through [employer] or phone. If you have a critical or abnormal lab result, we will notify you by phone as soon as possible.  Submit refill requests through [employer] or call your pharmacy and they will forward the refill request to us. Please allow 3 business days for your refill to be completed.          Additional Information About Your Visit        MyChart Information     [employer] gives you secure access to your electronic health record. If you see a primary care provider, you can also send messages to your care team and make appointments. If you have questions, please call your primary care clinic.  If you do not have a primary care provider, please call [phone] and they will assist you.        Care EveryWhere ID     This is your Care EveryWhere ID. This could be used by other organizations to access your [facility]  "records  [id number]        Your Vitals Were     Pulse Temperature Height Last Period Pulse Oximetry BMI (Body Mass Index)    82 98.3  F (36.8  C) (Oral) 5' 3.75\" (1.619 m) 01/12/2018 100% 22.96 kg/m2       Blood Pressure from Last 3 Encounters:   01/15/18 106/68   01/06/18 114/66   01/02/18 116/72    Weight from Last 3 Encounters:   01/15/18 132 lb 11.2 oz (60.2 kg)   01/01/18 132 lb 11.5 oz (60.2 kg)   12/22/17 139 lb 11.2 oz (63.4 kg)              We Performed the Following     Blood Morphology Pathologist Review     CBC with platelets differential     GASTROENTEROLOGY ADULT REF CONSULT ONLY     GASTROENTEROLOGY ADULT REF PROCEDURE ONLY     ONC/HEME ADULT REFERRAL     Reticulocyte Count     TDAP VACCINE (ADACEL)          Today's Medication Changes          These changes are accurate as of: 1/15/18  3:24 PM.  If you have any questions, ask your nurse or doctor.               Start taking these medicines.        Dose/Directions    ferrous sulfate 142 (45 FE) MG Tbcr   Commonly known as:  SLO-FE   Used for:  Iron deficiency anemia, unspecified iron deficiency anemia type   Started by:  Nadine Villarreal MD        Dose:  142 mg   Take 1 tablet (142 mg) by mouth daily   Quantity:  100 tablet   Refills:  1         These medicines have changed or have updated prescriptions.        Dose/Directions    * methylphenidate 5 MG tablet   Commonly known as:  RITALIN   This may have changed:  Another medication with the same name was added. Make sure you understand how and when to take each.   Used for:  ADHD (attention deficit hyperactivity disorder), inattentive type   Changed by:  Nadine Villarreal MD        Dose:  5 mg   Take 1 tablet (5 mg) by mouth 2 times daily   Quantity:  60 tablet   Refills:  0       * methylphenidate 5 MG tablet   Commonly known as:  RITALIN   This may have changed:  You were already taking a medication with the same name, and this prescription was added. Make sure you understand how and when to " take each.   Used for:  ADHD (attention deficit hyperactivity disorder), inattentive type   Changed by:  Nadine Villarreal MD        Dose:  2.5-5 mg   Take 0.5-1 tablets (2.5-5 mg) by mouth 2 times daily   Quantity:  30 tablet   Refills:  0       * Notice:  This list has 2 medication(s) that are the same as other medications prescribed for you. Read the directions carefully, and ask your doctor or other care provider to review them with you.         Where to get your medicines      These medications were sent to [facility] [address] [facility] [address] [place] [address] [address]  [address] [facility] [address], [facility] [address]     Phone:  [phone]     ferrous sulfate 142 (45 FE) MG Tbcr         Some of these will need a paper prescription and others can be bought over the counter.  Ask your nurse if you have questions.     Bring a paper prescription for each of these medications     methylphenidate 5 MG tablet                Primary Care Provider Office Phone # Fax #    Nadine Villarreal -[phone] [address] [place] [address]        Equal Access to Services     [facility]: Hadii aad ku hadasho Soomaali, waaxda luqadaha, qaybta kaalmada adeegyada, abi brantley . So [facility] [phone].    ATENCIÓN: Si habla español, tiene a acosta disposición servicios gratuitos de asistencia lingüística. [facility] [phone].    We comply with applicable federal civil rights laws and [place] laws. We do not discriminate on the basis of race, color, national origin, age, disability, sex, sexual orientation, or gender identity.            Thank you!     Thank you for choosing [facility]  for your care. Our goal is always to provide you with excellent care. Hearing back from our patients is one way we can continue to improve our services. Please take a few minutes to complete the written survey that you may receive in the mail  after your visit with us. Thank you!             Your Updated Medication List - Protect others around you: Learn how to safely use, store and throw away your medicines at www.disposemymeds.org.          This list is accurate as of: 1/15/18  3:24 PM.  Always use your most recent med list.                   Brand Name Dispense Instructions for use Diagnosis    acetaminophen 325 MG tablet    TYLENOL    1 Bottle    Take 2 tablets (650 mg) by mouth every 4 hours as needed for mild pain or fever    Acute post-operative pain       drospirenone-ethinyl estradiol 3-0.03 MG per tablet    HAKAN    84 tablet    Take 1 tablet by mouth daily    PMDD (premenstrual dysphoric disorder)       EPINEPHrine 0.3 MG/0.3ML injection 2-pack    EPIPEN/ADRENACLICK/or ANY BX GENERIC EQUIV    0.6 mL    Inject 0.3 mLs (0.3 mg) into the muscle as needed for anaphylaxis    History of bee sting allergy       ferrous sulfate 142 (45 FE) MG Tbcr    SLO-FE    100 tablet    Take 1 tablet (142 mg) by mouth daily    Iron deficiency anemia, unspecified iron deficiency anemia type       ibuprofen 200 MG tablet    ADVIL/MOTRIN    100 tablet    Take 2-3 tablets (400-600 mg) by mouth every 6 hours as needed for fever ((temp greater than 38.0C, 100.4F) or mild pain)    Acute post-operative pain       * methylphenidate 5 MG tablet    RITALIN    60 tablet    Take 1 tablet (5 mg) by mouth 2 times daily    ADHD (attention deficit hyperactivity disorder), inattentive type       * methylphenidate 5 MG tablet    RITALIN    30 tablet    Take 0.5-1 tablets (2.5-5 mg) by mouth 2 times daily    ADHD (attention deficit hyperactivity disorder), inattentive type       pantoprazole 40 MG EC tablet    PROTONIX    30 tablet    Take 1 tablet (40 mg) by mouth daily    ADHD (attention deficit hyperactivity disorder), inattentive type       * Notice:  This list has 2 medication(s) that are the same as other medications prescribed for you. Read the directions carefully, and ask  your doctor or other care provider to review them with you.

## 2018-01-15 NOTE — NURSING NOTE
"Chief Complaint   Patient presents with     Recheck Medication       Initial /68 (Cuff Size: Adult Regular)  Pulse 82  Temp 98.3  F (36.8  C) (Oral)  Ht 5' 3.75\" (1.619 m)  Wt 132 lb 11.2 oz (60.2 kg)  LMP 11/27/2017 (Approximate)  SpO2 100%  BMI 22.96 kg/m2 Estimated body mass index is 22.96 kg/(m^2) as calculated from the following:    Height as of this encounter: 5' 3.75\" (1.619 m).    Weight as of this encounter: 132 lb 11.2 oz (60.2 kg).  Medication Reconciliation: complete   Randa Rodríguez LPN    "

## 2018-01-16 LAB — COPATH REPORT: NORMAL

## 2018-01-18 ENCOUNTER — TRANSFERRED RECORDS (OUTPATIENT)
Dept: HEALTH INFORMATION MANAGEMENT | Facility: CLINIC | Age: 23
End: 2018-01-18

## 2018-01-23 ENCOUNTER — OFFICE VISIT (OUTPATIENT)
Dept: PEDIATRICS | Facility: CLINIC | Age: 23
End: 2018-01-23
Payer: COMMERCIAL

## 2018-01-23 DIAGNOSIS — R10.32 ABDOMINAL CRAMPING, BILATERAL LOWER QUADRANT: Primary | ICD-10-CM

## 2018-01-23 DIAGNOSIS — R10.31 ABDOMINAL CRAMPING, BILATERAL LOWER QUADRANT: Primary | ICD-10-CM

## 2018-01-23 DIAGNOSIS — Z90.49 S/P LAPAROSCOPIC APPENDECTOMY: ICD-10-CM

## 2018-01-23 LAB
ALBUMIN UR-MCNC: 30 MG/DL
APPEARANCE UR: CLEAR
BILIRUB UR QL STRIP: NEGATIVE
COLOR UR AUTO: YELLOW
GLUCOSE UR STRIP-MCNC: NEGATIVE MG/DL
HGB UR QL STRIP: NEGATIVE
KETONES UR STRIP-MCNC: 15 MG/DL
LEUKOCYTE ESTERASE UR QL STRIP: NEGATIVE
NITRATE UR QL: NEGATIVE
NON-SQ EPI CELLS #/AREA URNS LPF: NORMAL /LPF
PH UR STRIP: 5.5 PH (ref 5–7)
RBC #/AREA URNS AUTO: NORMAL /HPF
SOURCE: ABNORMAL
SP GR UR STRIP: >1.03 (ref 1–1.03)
SPECIMEN SOURCE: NORMAL
URNS CMNT MICRO: NORMAL
UROBILINOGEN UR STRIP-ACNC: 0.2 EU/DL (ref 0.2–1)
WBC #/AREA URNS AUTO: NORMAL /HPF
WET PREP SPEC: NORMAL

## 2018-01-23 PROCEDURE — 81001 URINALYSIS AUTO W/SCOPE: CPT | Performed by: PEDIATRICS

## 2018-01-23 PROCEDURE — 87210 SMEAR WET MOUNT SALINE/INK: CPT | Performed by: PEDIATRICS

## 2018-01-23 PROCEDURE — 99213 OFFICE O/P EST LOW 20 MIN: CPT | Performed by: PEDIATRICS

## 2018-01-23 NOTE — MR AVS SNAPSHOT
"              After Visit Summary   1/23/2018    Mariposa Nunez    MRN: [id number]           Patient Information     Date Of Birth          1995        Visit Information        Provider Department      1/23/2018 2:40 PM Desirae Tran MD [facility]        Today's Diagnoses     Abdominal cramping, bilateral lower quadrant    -  1    S/P laparoscopic appendectomy           Follow-ups after your visit        Who to contact     If you have questions or need follow up information about today's clinic visit or your schedule please contact [facility] directly at [phone].  Normal or non-critical lab and imaging results will be communicated to you by "[employer]hart, letter or phone within 4 business days after the clinic has received the results. If you do not hear from us within 7 days, please contact the clinic through "[employer]hart or phone. If you have a critical or abnormal lab result, we will notify you by phone as soon as possible.  Submit refill requests through [employer] or call your pharmacy and they will forward the refill request to us. Please allow 3 business days for your refill to be completed.          Additional Information About Your Visit        MyChart Information     [employer] gives you secure access to your electronic health record. If you see a primary care provider, you can also send messages to your care team and make appointments. If you have questions, please call your primary care clinic.  If you do not have a primary care provider, please call [phone] and they will assist you.        Care EveryWhere ID     This is your Care EveryWhere ID. This could be used by other organizations to access your [place] medical records  [id number]        Your Vitals Were     Pulse Temperature Height Last Period Pulse Oximetry BMI (Body Mass Index)    90 98.9  F (37.2  C) (Tympanic) 5' 4\" (1.626 m) 01/12/2018 97% 22.83 kg/m2       Blood Pressure from Last 3 Encounters:   01/23/18 " 100/64   01/15/18 106/68   01/06/18 114/66    Weight from Last 3 Encounters:   01/23/18 133 lb (60.3 kg)   01/15/18 132 lb 11.2 oz (60.2 kg)   01/01/18 132 lb 11.5 oz (60.2 kg)              We Performed the Following     *UA reflex to Microscopic and Culture ([place] and [facility] (except [place] and [place])     Urine Microscopic     Wet prep        Primary Care Provider Office Phone # Fax #    Nadine Villarreal [phone] [address] [place] [address]        Equal Access to Services     [facility]: Hadii aad rosetta hadgay Soshruti, waaxda luqadaha, qaybta kaalmada manny, abi brantley . So [facility] [phone].    ATENCIÓN: Si habla español, tiene a acosta disposición servicios gratuitos de asistencia lingüística. Llame al [phone].    We comply with applicable federal civil rights laws and [place] laws. We do not discriminate on the basis of race, color, national origin, age, disability, sex, sexual orientation, or gender identity.            Thank you!     Thank you for choosing [facility]  for your care. Our goal is always to provide you with excellent care. Hearing back from our patients is one way we can continue to improve our services. Please take a few minutes to complete the written survey that you may receive in the mail after your visit with us. Thank you!             Your Updated Medication List - Protect others around you: Learn how to safely use, store and throw away your medicines at www.disposemymeds.org.          This list is accurate as of 1/23/18 11:59 PM.  Always use your most recent med list.                   Brand Name Dispense Instructions for use Diagnosis    acetaminophen 325 MG tablet    TYLENOL    1 Bottle    Take 2 tablets (650 mg) by mouth every 4 hours as needed for mild pain or fever    Acute post-operative pain       drospirenone-ethinyl estradiol 3-0.03 MG per tablet    HAKAN    84 tablet     Take 1 tablet by mouth daily    PMDD (premenstrual dysphoric disorder)       EPINEPHrine 0.3 MG/0.3ML injection 2-pack    EPIPEN/ADRENACLICK/or ANY BX GENERIC EQUIV    0.6 mL    Inject 0.3 mLs (0.3 mg) into the muscle as needed for anaphylaxis    History of bee sting allergy       ferrous sulfate 142 (45 FE) MG Tbcr    SLO-FE    100 tablet    Take 1 tablet (142 mg) by mouth daily    Iron deficiency anemia, unspecified iron deficiency anemia type       ibuprofen 200 MG tablet    ADVIL/MOTRIN    100 tablet    Take 2-3 tablets (400-600 mg) by mouth every 6 hours as needed for fever ((temp greater than 38.0C, 100.4F) or mild pain)    Acute post-operative pain       * methylphenidate 5 MG tablet    RITALIN    60 tablet    Take 1 tablet (5 mg) by mouth 2 times daily    ADHD (attention deficit hyperactivity disorder), inattentive type       * methylphenidate 5 MG tablet    RITALIN    30 tablet    Take 0.5-1 tablets (2.5-5 mg) by mouth 2 times daily    ADHD (attention deficit hyperactivity disorder), inattentive type       ranitidine 150 MG capsule    ZANTAC     Take 150 mg by mouth 2 times daily        * Notice:  This list has 2 medication(s) that are the same as other medications prescribed for you. Read the directions carefully, and ask your doctor or other care provider to review them with you.

## 2018-01-23 NOTE — NURSING NOTE
"No chief complaint on file.      Initial /64 (BP Location: Right arm, Patient Position: Chair, Cuff Size: Adult Regular)  Pulse 105  Temp 98.9  F (37.2  C) (Tympanic)  Ht 5' 4\" (1.626 m)  Wt 133 lb (60.3 kg)  LMP 01/12/2018  SpO2 97%  BMI 22.83 kg/m2 Estimated body mass index is 22.83 kg/(m^2) as calculated from the following:    Height as of this encounter: 5' 4\" (1.626 m).    Weight as of this encounter: 133 lb (60.3 kg).  Medication Reconciliation: complete   Danielle CRUZ      "

## 2018-01-23 NOTE — PROGRESS NOTES
SUBJECTIVE:   Mariposa Nunez is a 22 year old female who presents to clinic today for the following health issues:      URINARY TRACT SYMPTOMS      Duration: 4-5 days    Description  dysuria, frequency, urgency and cramping    Intensity:  mild    Accompanying signs and symptoms:  Fever/chills: no   Flank pain YES  Nausea and vomiting: no   Vaginal symptoms: none  Abdominal/Pelvic Pain: YES low camping pain    History  History of frequent UTI's: no   History of kidney stones: no   Sexually Active: YES  Possibility of pregnancy: No    Precipitating or alleviating factors: None    Therapies tried and outcome: fluids   Outcome: not effective  Noticing cramping with urination. Also with lower abdominal cramping during the day that doesn't coordinate with urination.    Eventful past month with emergent appendectomy - had to come home from a study abroad program in [facility] for this.   Recovery has gone well.  Had a yeast infection after discharge from hospital, but that cleared with treatment.  Started iron supplementation about 1 week ago.    Current symptoms for the last 4-5 days.   Lower abdominal and lower back cramping.  Has been trying to push fluids, but doesn't feel that she is urinating very much.  Denies urine being dark.  No new vaginal symptoms - denies itching or discharge.   Had spotting and then 7 days of menses after her surgery.    Recent negative gonorrhea and chlamydia testing.   No fevers or chills.   Good appetite.   Has been a little bit constipated.    Returning to college in the near future and wanted to make sure that she didn't have a bladder infection.    Otherwise, has been feeling well with no URI symptoms or cough.          Problem list and histories reviewed & adjusted, as indicated.  Additional history: as documented      Reviewed and updated as needed this visit by clinical staffTobacco  Allergies  Meds  Med Hx  Surg Hx  Fam Hx  Soc Hx      Reviewed and updated as needed this visit  "by Provider         ROS:  Constitutional, HEENT, cardiovascular, pulmonary, gi and gu systems are negative, except as otherwise noted.    OBJECTIVE:     /64 (BP Location: Right arm, Patient Position: Chair, Cuff Size: Adult Regular)  Pulse 105  Temp 98.9  F (37.2  C) (Tympanic)  Ht 5' 4\" (1.626 m)  Wt 133 lb (60.3 kg)  LMP 01/12/2018  SpO2 97%  BMI 22.83 kg/m2  Body mass index is 22.83 kg/(m^2).  Pulse recheck during exam of 90.  GENERAL: healthy, alert and no distress  HENT: ear canals and TM's normal, nose and mouth without ulcers or lesions  NECK: no adenopathy, no asymmetry, masses, or scars and thyroid normal to palpation  RESP: lungs clear to auscultation - no rales, rhonchi or wheezes  CV: regular rate and rhythm, normal S1 S2, no S3 or S4, no murmur, click or rub, no peripheral edema and peripheral pulses strong  ABDOMEN: well-healed incisions in left lower quadrant, positive bowel sounds, no rebound or guarding, mild tenderness to palpation deep to incisions LLQ, no masses  PSYCH: mentation appears normal, affect normal/bright    Diagnostic Test Results:  Results for orders placed or performed in visit on 01/23/18 (from the past 24 hour(s))   *UA reflex to Microscopic and Culture ([place] and [facility] (except [place] and [place])   Result Value Ref Range    Color Urine Yellow     Appearance Urine Clear     Glucose Urine Negative NEG^Negative mg/dL    Bilirubin Urine Negative NEG^Negative    Ketones Urine 15 (A) NEG^Negative mg/dL    Specific Gravity Urine >1.030 1.003 - 1.035    Blood Urine Negative NEG^Negative    pH Urine 5.5 5.0 - 7.0 pH    Protein Albumin Urine 30 (A) NEG^Negative mg/dL    Urobilinogen Urine 0.2 0.2 - 1.0 EU/dL    Nitrite Urine Negative NEG^Negative    Leukocyte Esterase Urine Negative NEG^Negative    Source Midstream Urine    Urine Microscopic   Result Value Ref Range    WBC Urine O - 2 OTO2^O - 2 /HPF    RBC Urine O - 2 OTO2^O - 2 /HPF    Squamous Epithelial " /LPF Urine Few FEW^Few /LPF    Comment Urine Unconcentrated    Wet prep   Result Value Ref Range    Specimen Description Vagina     Wet Prep No yeast seen     Wet Prep No clue cells seen     Wet Prep No Trichomonas seen        ASSESSMENT/PLAN:       ICD-10-CM    1. Abdominal cramping, bilateral lower quadrant R10.31 No evidence of yeast infection, bv, or UTI based on testing today.  Discussed use of ibuprofen and monitoring for lower abdominal and lower back symptoms.   Patient to alert us with changes - reviewed that she can use MyChart for this while back at college.    Plans to push fluids and monitor.    R10.32    2. S/P laparoscopic appendectomy Z90.49 Recovering well after surgery.         Desirae Tran MD  [facility]

## 2018-01-24 VITALS
HEART RATE: 90 BPM | WEIGHT: 133 LBS | HEIGHT: 64 IN | DIASTOLIC BLOOD PRESSURE: 64 MMHG | OXYGEN SATURATION: 97 % | BODY MASS INDEX: 22.71 KG/M2 | TEMPERATURE: 98.9 F | SYSTOLIC BLOOD PRESSURE: 100 MMHG

## 2018-03-19 ENCOUNTER — TRANSFERRED RECORDS (OUTPATIENT)
Dept: HEALTH INFORMATION MANAGEMENT | Facility: CLINIC | Age: 23
End: 2018-03-19

## 2018-04-29 ENCOUNTER — HEALTH MAINTENANCE LETTER (OUTPATIENT)
Age: 23
End: 2018-04-29

## 2018-06-30 ENCOUNTER — TRANSFERRED RECORDS (OUTPATIENT)
Dept: HEALTH INFORMATION MANAGEMENT | Facility: CLINIC | Age: 23
End: 2018-06-30

## 2018-08-14 ENCOUNTER — TRANSFERRED RECORDS (OUTPATIENT)
Dept: HEALTH INFORMATION MANAGEMENT | Facility: CLINIC | Age: 23
End: 2018-08-14

## 2018-08-14 DIAGNOSIS — Z53.9 ERRONEOUS ENCOUNTER--DISREGARD: Primary | ICD-10-CM

## 2018-08-16 ENCOUNTER — OFFICE VISIT (OUTPATIENT)
Dept: OPHTHALMOLOGY | Facility: CLINIC | Age: 23
End: 2018-08-16
Payer: COMMERCIAL

## 2018-08-16 DIAGNOSIS — H52.223 REGULAR ASTIGMATISM OF BOTH EYES: Primary | ICD-10-CM

## 2018-08-16 ASSESSMENT — REFRACTION
OS_CYLINDER: +0.50
OD_CYLINDER: +0.50
OD_SPHERE: -0.25
OS_AXIS: 137
OD_AXIS: 007
OS_SPHERE: -0.75

## 2018-08-16 ASSESSMENT — VISUAL ACUITY
OS_SC: 20/20
OD_SC: 20/20
METHOD: SNELLEN - LINEAR

## 2018-08-16 ASSESSMENT — TONOMETRY
IOP_METHOD: TONOPEN
OD_IOP_MMHG: 15
OS_IOP_MMHG: 13

## 2018-08-16 ASSESSMENT — SLIT LAMP EXAM - LIDS
COMMENTS: NORMAL
COMMENTS: NORMAL

## 2018-08-16 ASSESSMENT — EXTERNAL EXAM - RIGHT EYE: OD_EXAM: NORMAL

## 2018-08-16 ASSESSMENT — CUP TO DISC RATIO
OD_RATIO: 0.2
OS_RATIO: 0.2

## 2018-08-16 ASSESSMENT — EXTERNAL EXAM - LEFT EYE: OS_EXAM: NORMAL

## 2018-08-16 NOTE — PROGRESS NOTES
HPI:  Patient is here for a consult in size disparity of the optic nerve head. No headaches. Vision is stable. Stable floaters. No flashes seen. No eye pain.       Pertinent Medical History:    None    Ocular History:    Size disparity of optic nerve.     Astigmatism.     Eye Medications:    Birth control pills.     Assessment and Plan:  1.   No optic nerve disparity seen, both eyes.     Size of optic nerve: right eye: 1.4 mm and left eye: 1.5 mm       Medical History:  No past medical history on file.    Medications:  Current Outpatient Prescriptions   Medication Sig Dispense Refill     acetaminophen (TYLENOL) 325 MG tablet Take 2 tablets (650 mg) by mouth every 4 hours as needed for mild pain or fever (Patient not taking: Reported on 1/23/2018) 1 Bottle 1     drospirenone-ethinyl estradiol (HAKAN) 3-0.03 MG per tablet Take 1 tablet by mouth daily 84 tablet 3     EPINEPHrine (EPIPEN/ADRENACLICK/OR ANY BX GENERIC EQUIV) 0.3 MG/0.3ML injection 2-pack Inject 0.3 mLs (0.3 mg) into the muscle as needed for anaphylaxis 0.6 mL 11     ferrous sulfate (SLO-FE) 142 (45 FE) MG TBCR Take 1 tablet (142 mg) by mouth daily 100 tablet 1     ibuprofen (ADVIL/MOTRIN) 200 MG tablet Take 2-3 tablets (400-600 mg) by mouth every 6 hours as needed for fever ((temp greater than 38.0C, 100.4F) or mild pain) (Patient not taking: Reported on 1/23/2018) 100 tablet 1     methylphenidate (RITALIN) 5 MG tablet Take 0.5-1 tablets (2.5-5 mg) by mouth 2 times daily 30 tablet 0     methylphenidate (RITALIN) 5 MG tablet Take 1 tablet (5 mg) by mouth 2 times daily (Patient not taking: Reported on 1/6/2018) 60 tablet 0     ranitidine (ZANTAC) 150 MG capsule Take 150 mg by mouth 2 times daily

## 2018-08-16 NOTE — MR AVS SNAPSHOT
After Visit Summary   8/16/2018    Mariposa Nunez    MRN: [id number]           Patient Information     Date Of Birth          1995        Visit Information        Provider Department      8/16/2018 10:30 AM Mar Lambert Chi, OD M [facility]        Today's Diagnoses     Regular astigmatism of both eyes    -  1       Follow-ups after your visit        Your next 10 appointments already scheduled     Aug 20, 2018  2:30 PM CDT   PHYSICAL with Nadine Villarreal MD   [facility]    [address] [place] [id number] [phone]              Who to contact     Please call your clinic at [phone] to:    Ask questions about your health    Make or cancel appointments    Discuss your medicines    Learn about your test results    Speak to your doctor            Additional Information About Your Visit        MyChart Information     [employer] gives you secure access to your electronic health record. If you see a primary care provider, you can also send messages to your care team and make appointments. If you have questions, please call your primary care clinic.  If you do not have a primary care provider, please call [phone] and they will assist you.      [employer] is an electronic gateway that provides easy, online access to your medical records. With [employer], you can request a clinic appointment, read your test results, renew a prescription or communicate with your care team.     To access your existing account, please contact your [facility] or call [phone] for assistance.        Care EveryWhere ID     This is your Care EveryWhere ID. This could be used by other organizations to access your [place] medical records  [id number]         Blood Pressure from Last 3 Encounters:   01/23/18 100/64   01/15/18 106/68   01/06/18 114/66    Weight from Last 3 Encounters:   01/23/18 60.3 kg (133 lb)    01/15/18 60.2 kg (132 lb 11.2 oz)   01/01/18 60.2 kg (132 lb 11.5 oz)              Today, you had the following     No orders found for display       Primary Care Provider Office Phone # Fax #    Nadine Villarreal [phone] [address] [place] [address]        Equal Access to Services     [facility]: Hadii aad ku hadasho Soomaali, waaxda luqadaha, qaybta kaalmada adeegyada, waxay idiin hayaan adeeg viviansh la'aan . So [place] [phone].    ATENCIÓN: Si habla espshashi, tiene a acosta disposición servicios gratuitos de asistencia lingüística. [facility] [phone].    We comply with applicable federal civil rights laws and [place] laws. We do not discriminate on the basis of race, color, national origin, age, disability, sex, sexual orientation, or gender identity.            Thank you!     Thank you for choosing [facility]  for your care. Our goal is always to provide you with excellent care. Hearing back from our patients is one way we can continue to improve our services. Please take a few minutes to complete the written survey that you may receive in the mail after your visit with us. Thank you!             Your Updated Medication List - Protect others around you: Learn how to safely use, store and throw away your medicines at www.disposemymeds.org.          This list is accurate as of 8/16/18 11:30 AM.  Always use your most recent med list.                   Brand Name Dispense Instructions for use Diagnosis    acetaminophen 325 MG tablet    TYLENOL    1 Bottle    Take 2 tablets (650 mg) by mouth every 4 hours as needed for mild pain or fever    Acute post-operative pain       drospirenone-ethinyl estradiol 3-0.03 MG per tablet    HAKAN    84 tablet    Take 1 tablet by mouth daily    PMDD (premenstrual dysphoric disorder)       EPINEPHrine 0.3 MG/0.3ML injection 2-pack    EPIPEN/ADRENACLICK/or ANY BX GENERIC EQUIV    0.6 mL    Inject 0.3 mLs (0.3 mg) into  the muscle as needed for anaphylaxis    History of bee sting allergy       ferrous sulfate 142 (45 Fe) MG Tbcr    SLO-FE    100 tablet    Take 1 tablet (142 mg) by mouth daily    Iron deficiency anemia, unspecified iron deficiency anemia type       ibuprofen 200 MG tablet    ADVIL/MOTRIN    100 tablet    Take 2-3 tablets (400-600 mg) by mouth every 6 hours as needed for fever ((temp greater than 38.0C, 100.4F) or mild pain)    Acute post-operative pain       * methylphenidate 5 MG tablet    RITALIN    60 tablet    Take 1 tablet (5 mg) by mouth 2 times daily    ADHD (attention deficit hyperactivity disorder), inattentive type       * methylphenidate 5 MG tablet    RITALIN    30 tablet    Take 0.5-1 tablets (2.5-5 mg) by mouth 2 times daily    ADHD (attention deficit hyperactivity disorder), inattentive type       ranitidine 150 MG capsule    ZANTAC     Take 150 mg by mouth 2 times daily        * Notice:  This list has 2 medication(s) that are the same as other medications prescribed for you. Read the directions carefully, and ask your doctor or other care provider to review them with you.

## 2018-08-20 ENCOUNTER — OFFICE VISIT (OUTPATIENT)
Dept: PEDIATRICS | Facility: CLINIC | Age: 23
End: 2018-08-20
Payer: COMMERCIAL

## 2018-08-20 VITALS
OXYGEN SATURATION: 98 % | DIASTOLIC BLOOD PRESSURE: 68 MMHG | BODY MASS INDEX: 23.41 KG/M2 | WEIGHT: 137.1 LBS | HEART RATE: 100 BPM | SYSTOLIC BLOOD PRESSURE: 100 MMHG | TEMPERATURE: 98.4 F | HEIGHT: 64 IN

## 2018-08-20 DIAGNOSIS — Z86.2 HISTORY OF IRON DEFICIENCY ANEMIA: ICD-10-CM

## 2018-08-20 DIAGNOSIS — Z01.419 ENCOUNTER FOR GYNECOLOGICAL EXAMINATION WITHOUT ABNORMAL FINDING: ICD-10-CM

## 2018-08-20 DIAGNOSIS — Z11.3 ROUTINE SCREENING FOR STI (SEXUALLY TRANSMITTED INFECTION): Primary | ICD-10-CM

## 2018-08-20 DIAGNOSIS — Z91.030 HISTORY OF BEE STING ALLERGY: ICD-10-CM

## 2018-08-20 DIAGNOSIS — Z12.4 PAP SMEAR FOR CERVICAL CANCER SCREENING: ICD-10-CM

## 2018-08-20 DIAGNOSIS — F90.0 ADHD (ATTENTION DEFICIT HYPERACTIVITY DISORDER), INATTENTIVE TYPE: ICD-10-CM

## 2018-08-20 LAB — HGB BLD-MCNC: 13.4 G/DL (ref 11.7–15.7)

## 2018-08-20 PROCEDURE — 87591 N.GONORRHOEAE DNA AMP PROB: CPT | Performed by: INTERNAL MEDICINE

## 2018-08-20 PROCEDURE — 87491 CHLMYD TRACH DNA AMP PROBE: CPT | Performed by: INTERNAL MEDICINE

## 2018-08-20 PROCEDURE — 99213 OFFICE O/P EST LOW 20 MIN: CPT | Mod: 25 | Performed by: INTERNAL MEDICINE

## 2018-08-20 PROCEDURE — 82728 ASSAY OF FERRITIN: CPT | Performed by: INTERNAL MEDICINE

## 2018-08-20 PROCEDURE — 99395 PREV VISIT EST AGE 18-39: CPT | Performed by: INTERNAL MEDICINE

## 2018-08-20 PROCEDURE — 83550 IRON BINDING TEST: CPT | Performed by: INTERNAL MEDICINE

## 2018-08-20 PROCEDURE — 85018 HEMOGLOBIN: CPT | Performed by: INTERNAL MEDICINE

## 2018-08-20 PROCEDURE — 83540 ASSAY OF IRON: CPT | Performed by: INTERNAL MEDICINE

## 2018-08-20 PROCEDURE — 36415 COLL VENOUS BLD VENIPUNCTURE: CPT | Performed by: INTERNAL MEDICINE

## 2018-08-20 PROCEDURE — G0145 SCR C/V CYTO,THINLAYER,RESCR: HCPCS | Performed by: INTERNAL MEDICINE

## 2018-08-20 RX ORDER — METHYLPHENIDATE HYDROCHLORIDE 5 MG/1
2.5-5 TABLET ORAL 2 TIMES DAILY
Qty: 30 TABLET | Refills: 0 | Status: SHIPPED | OUTPATIENT
Start: 2018-10-20 | End: 2020-12-03

## 2018-08-20 RX ORDER — EPINEPHRINE 0.3 MG/.3ML
0.3 INJECTION SUBCUTANEOUS PRN
Qty: 0.6 ML | Refills: 11 | Status: SHIPPED | OUTPATIENT
Start: 2018-08-20 | End: 2022-12-08

## 2018-08-20 RX ORDER — METHYLPHENIDATE HYDROCHLORIDE 5 MG/1
2.5-5 TABLET ORAL 2 TIMES DAILY
Qty: 30 TABLET | Refills: 0 | Status: SHIPPED | OUTPATIENT
Start: 2018-08-20 | End: 2018-08-20

## 2018-08-20 RX ORDER — METHYLPHENIDATE HYDROCHLORIDE 5 MG/1
2.5-5 TABLET ORAL 2 TIMES DAILY
Qty: 30 TABLET | Refills: 0 | Status: SHIPPED | OUTPATIENT
Start: 2018-09-20 | End: 2018-08-20

## 2018-08-20 ASSESSMENT — ENCOUNTER SYMPTOMS
NAUSEA: 0
DIZZINESS: 0
CHILLS: 0
PALPITATIONS: 0
EYE PAIN: 0
HEMATOCHEZIA: 0
SORE THROAT: 0
DIARRHEA: 0
FEVER: 0
DYSURIA: 0
JOINT SWELLING: 0
NERVOUS/ANXIOUS: 0
ABDOMINAL PAIN: 0
BREAST MASS: 0
MYALGIAS: 0
SHORTNESS OF BREATH: 0
CONSTIPATION: 0
HEMATURIA: 0
WEAKNESS: 0
HEARTBURN: 0
ARTHRALGIAS: 0
HEADACHES: 0
COUGH: 0
FREQUENCY: 0

## 2018-08-20 NOTE — PATIENT INSTRUCTIONS
Preventive Health Recommendations  Female Ages 21 to 25     Yearly exam:     See your health care provider every year in order to  o Review health changes.   o Discuss preventive care.    o Review your medicines if your doctor has prescribed any.      You should be tested each year for STDs (sexually transmitted diseases).       Talk to your provider about how often you should have cholesterol testing.      Get a Pap test every three years. If you have an abnormal result, your doctor may have you test more often.      If you are at risk for diabetes, you should have a diabetes test (fasting glucose).     Shots:     Get a flu shot each year.     Get a tetanus shot every 10 years.     Consider getting the shot (vaccine) that prevents cervical cancer (Gardasil).    Nutrition:     Eat at least 5 servings of fruits and vegetables each day.    Eat whole-grain bread, whole-wheat pasta and brown rice instead of white grains and rice.    Get adequate Calcium and Vitamin D.     Lifestyle    Exercise at least 150 minutes a week each week (30 minutes a day, 5 days a week). This will help you control your weight and prevent disease.    Limit alcohol to one drink per day.    No smoking.     Wear sunscreen to prevent skin cancer.    See your dentist every six months for an exam and cleaning.

## 2018-08-20 NOTE — MR AVS SNAPSHOT
After Visit Summary   8/20/2018    Mariposa Nunez    MRN: [id number]           Patient Information     Date Of Birth          1995        Visit Information        Provider Department      8/20/2018 2:30 PM Nadine Villarreal MD [facility]        Today's Diagnoses     Encounter for gynecological examination without abnormal finding        ADHD (attention deficit hyperactivity disorder), inattentive type        History of bee sting allergy        History of iron deficiency anemia          Care Instructions      Preventive Health Recommendations  Female Ages 21 to 25     Yearly exam:     See your health care provider every year in order to  o Review health changes.   o Discuss preventive care.    o Review your medicines if your doctor has prescribed any.      You should be tested each year for STDs (sexually transmitted diseases).       Talk to your provider about how often you should have cholesterol testing.      Get a Pap test every three years. If you have an abnormal result, your doctor may have you test more often.      If you are at risk for diabetes, you should have a diabetes test (fasting glucose).     Shots:     Get a flu shot each year.     Get a tetanus shot every 10 years.     Consider getting the shot (vaccine) that prevents cervical cancer (Gardasil).    Nutrition:     Eat at least 5 servings of fruits and vegetables each day.    Eat whole-grain bread, whole-wheat pasta and brown rice instead of white grains and rice.    Get adequate Calcium and Vitamin D.     Lifestyle    Exercise at least 150 minutes a week each week (30 minutes a day, 5 days a week). This will help you control your weight and prevent disease.    Limit alcohol to one drink per day.    No smoking.     Wear sunscreen to prevent skin cancer.    See your dentist every six months for an exam and cleaning.              Follow-ups after your visit        Who to contact     If you have questions or need follow  "up information about today's clinic visit or your schedule please contact [facility] directly at [phone].  Normal or non-critical lab and imaging results will be communicated to you by [employer], letter or phone within 4 business days after the clinic has received the results. If you do not hear from us within 7 days, please contact the clinic through [employer] or phone. If you have a critical or abnormal lab result, we will notify you by phone as soon as possible.  Submit refill requests through [employer] or call your pharmacy and they will forward the refill request to us. Please allow 3 business days for your refill to be completed.          Additional Information About Your Visit        [employer] Information     [employer] gives you secure access to your electronic health record. If you see a primary care provider, you can also send messages to your care team and make appointments. If you have questions, please call your primary care clinic.  If you do not have a primary care provider, please call [phone] and they will assist you.        Care EveryWhere ID     This is your Care EveryWhere ID. This could be used by other organizations to access your [place] medical records  [id number]        Your Vitals Were     Pulse Temperature Height Last Period Pulse Oximetry BMI (Body Mass Index)    100 98.4  F (36.9  C) (Oral) 5' 4\" (1.626 m) 07/27/2018 98% 23.53 kg/m2       Blood Pressure from Last 3 Encounters:   08/20/18 100/68   01/23/18 100/64   01/15/18 106/68    Weight from Last 3 Encounters:   08/20/18 137 lb 1.6 oz (62.2 kg)   01/23/18 133 lb (60.3 kg)   01/15/18 132 lb 11.2 oz (60.2 kg)              We Performed the Following     Ferritin     Hemoglobin     Iron and iron binding capacity          Today's Medication Changes          These changes are accurate as of 8/20/18  3:42 PM.  If you have any questions, ask your nurse or doctor.               These medicines have changed or have updated " prescriptions.        Dose/Directions    EPINEPHrine 0.3 MG/0.3ML injection 2-pack   Commonly known as:  EPIPEN/ADRENACLICK/or ANY BX GENERIC EQUIV   This may have changed:  additional instructions   Used for:  History of bee sting allergy   Changed by:  Nadine Villarreal MD        Dose:  0.3 mg   Inject 0.3 mLs (0.3 mg) into the muscle as needed for anaphylaxis Patient will call when needs filled.   Quantity:  0.6 mL   Refills:  11       methylphenidate 5 MG tablet   Commonly known as:  RITALIN   This may have changed:    - how much to take  - These instructions start on 10/20/2018. If you are unsure what to do until then, ask your doctor or other care provider.   Used for:  ADHD (attention deficit hyperactivity disorder), inattentive type   Changed by:  Nadine Villarreal MD        Dose:  2.5-5 mg   Start taking on:  10/20/2018   Take 0.5-1 tablets (2.5-5 mg) by mouth 2 times daily   Quantity:  30 tablet   Refills:  0         Stop taking these medicines if you haven't already. Please contact your care team if you have questions.     ibuprofen 200 MG tablet   Commonly known as:  ADVIL/MOTRIN   Stopped by:  Nadine Villarreal MD                Where to get your medicines      These medications were sent to [facility] [address] [address] AT SEC of [facility] [address] [address]     Phone:  [phone]     EPINEPHrine 0.3 MG/0.3ML injection 2-pack         Some of these will need a paper prescription and others can be bought over the counter.  Ask your nurse if you have questions.     Bring a paper prescription for each of these medications     methylphenidate 5 MG tablet                Primary Care Provider Office Phone # Fax #    Nadine Villarreal [phone] [address] [place] [address]        Equal Access to Services     REJI MCPHERSON AH: Lilibeth Carter, kyle berumen, dominick cruz  abi bryantanthony oakleyaan ah. So [facility] [phone].    ATENCIÓN: Si dae wiggins, tiene a acosta disposición servicios gratuitos de asistencia lingüística. [facility] [phone].    We comply with applicable federal civil rights laws and [place] laws. We do not discriminate on the basis of race, color, national origin, age, disability, sex, sexual orientation, or gender identity.            Thank you!     Thank you for choosing [facility]  for your care. Our goal is always to provide you with excellent care. Hearing back from our patients is one way we can continue to improve our services. Please take a few minutes to complete the written survey that you may receive in the mail after your visit with us. Thank you!             Your Updated Medication List - Protect others around you: Learn how to safely use, store and throw away your medicines at www.disposemymeds.org.          This list is accurate as of 8/20/18  3:42 PM.  Always use your most recent med list.                   Brand Name Dispense Instructions for use Diagnosis    drospirenone-ethinyl estradiol 3-0.03 MG per tablet    HAKAN    84 tablet    Take 1 tablet by mouth daily    PMDD (premenstrual dysphoric disorder)       EPINEPHrine 0.3 MG/0.3ML injection 2-pack    EPIPEN/ADRENACLICK/or ANY BX GENERIC EQUIV    0.6 mL    Inject 0.3 mLs (0.3 mg) into the muscle as needed for anaphylaxis Patient will call when needs filled.    History of bee sting allergy       ferrous sulfate 142 (45 Fe) MG Tbcr    SLO-FE    100 tablet    Take 1 tablet (142 mg) by mouth daily    Iron deficiency anemia, unspecified iron deficiency anemia type       methylphenidate 5 MG tablet   Start taking on:  10/20/2018    RITALIN    30 tablet    Take 0.5-1 tablets (2.5-5 mg) by mouth 2 times daily    ADHD (attention deficit hyperactivity disorder), inattentive type       ranitidine 150 MG capsule    ZANTAC     Take 150 mg by mouth 2 times daily

## 2018-08-20 NOTE — PROGRESS NOTES
SUBJECTIVE:   CC: Mariposa Nunez is an 23 year old woman who presents for preventive health visit.     Physical   Annual:     Getting at least 3 servings of Calcium per day:  Yes    Bi-annual eye exam:  Yes    Dental care twice a year:  Yes    Sleep apnea or symptoms of sleep apnea:  None    Diet:  Regular (no restrictions)    Frequency of exercise:  2-3 days/week    Duration of exercise:  30-45 minutes    Taking medications regularly:  Yes    Medication side effects:  None    Additional concerns today:  No    States she is not tolerating iron. If she takes daily for 2 weeks, then she has stomach bloating and constipation for 2 weeks.     ADHD Follow-Up    Date of last ADHD office visit: last year  Status since last visit: Stable  Taking controlled (daily) medications as prescribed: Yes                       Reports she has had times when she feels her heart is beating faster. Had one episode of palpitations. Reports her fitbit recorded her pulse as 140 after taking ritalin 2.5 mg on empty stomach. Was eating breakfast. Resolved within a few minutes and felt more back to normal.    ADHD Medication     Stimulants - Misc. Disp Start End    methylphenidate (RITALIN) 5 MG tablet 30 tablet 10/20/2018     Sig - Route: Take 0.5-1 tablets (2.5-5 mg) by mouth 2 times daily - Oral    Class: Local Print          Medication Benefits:   Controlled symptoms: Attention span, Distractability, Finishing tasks and test taking  Uncontrolled Symptoms: None    Medication side effects:  Side effects noted: palpitations  Denies: appetite suppression and weight loss    Today's PHQ-2 Score:   PHQ-2 ( 1999 Pfizer) 8/20/2018   Q1: Little interest or pleasure in doing things 0   Q2: Feeling down, depressed or hopeless 0   PHQ-2 Score 0   Q1: Little interest or pleasure in doing things Not at all   Q2: Feeling down, depressed or hopeless Not at all   PHQ-2 Score 0       Abuse: Current or Past(Physical, Sexual or Emotional)- No  Do you feel  safe in your environment - Yes    Social History   Substance Use Topics     Smoking status: Never Smoker     Smokeless tobacco: Never Used     Alcohol use No     Alcohol Use 8/20/2018   If you drink alcohol do you typically have greater than 3 drinks per day OR greater than 7 drinks per week? No       Reviewed orders with patient.  Reviewed health maintenance and updated orders accordingly - Yes  BP Readings from Last 3 Encounters:   08/20/18 100/68   01/23/18 100/64   01/15/18 106/68    Wt Readings from Last 3 Encounters:   08/20/18 137 lb 1.6 oz (62.2 kg)   01/23/18 133 lb (60.3 kg)   01/15/18 132 lb 11.2 oz (60.2 kg)                    Mammogram not appropriate for this patient based on age.    Pertinent mammograms are reviewed under the imaging tab.  History of abnormal Pap smear: NO - age 21-29 PAP every 3 years recommended     Reviewed and updated as needed this visit by clinical staff  Tobacco  Allergies  Meds  Med Hx  Surg Hx  Fam Hx  Soc Hx        Reviewed and updated as needed this visit by Provider            Review of Systems   Constitutional: Negative for chills and fever.   HENT: Negative for congestion, ear pain, hearing loss and sore throat.    Eyes: Negative for pain and visual disturbance.   Respiratory: Negative for cough and shortness of breath.    Cardiovascular: Negative for chest pain, palpitations and peripheral edema.   Gastrointestinal: Negative for abdominal pain, constipation, diarrhea, heartburn, hematochezia and nausea.   Breasts:  Negative for tenderness, breast mass and discharge.   Genitourinary: Negative for dysuria, frequency, genital sores, hematuria, pelvic pain, urgency, vaginal bleeding and vaginal discharge.   Musculoskeletal: Negative for arthralgias, joint swelling and myalgias.   Skin: Negative for rash.   Neurological: Negative for dizziness, weakness and headaches.   Psychiatric/Behavioral: Negative for mood changes. The patient is not nervous/anxious.          "  OBJECTIVE:   /68 (Cuff Size: Adult Regular)  Pulse 100  Temp 98.4  F (36.9  C) (Oral)  Ht 5' 4\" (1.626 m)  Wt 137 lb 1.6 oz (62.2 kg)  LMP 07/25/2018  SpO2 98%  BMI 23.53 kg/m2  Physical Exam  GENERAL: healthy, alert and no distress  EYES: Eyes grossly normal to inspection, PERRL and conjunctivae and sclerae normal  HENT: ear canals and TM's normal, nose and mouth without ulcers or lesions  NECK: no adenopathy, no asymmetry, masses, or scars and thyroid normal to palpation  RESP: lungs clear to auscultation - no rales, rhonchi or wheezes  BREAST: normal without masses, tenderness or nipple discharge and no palpable axillary masses or adenopathy  CV: regular rate and rhythm, normal S1 S2, no S3 or S4, no murmur, click or rub, no peripheral edema and peripheral pulses strong  ABDOMEN: soft, nontender, no hepatosplenomegaly, no masses and bowel sounds normal   (female): normal female external genitalia, normal urethral meatus, vaginal mucosa pink, moist, well rugated, and normal cervix/adnexa/uterus without masses or discharge  MS: no gross musculoskeletal defects noted, no edema  SKIN: no suspicious lesions or rashes  NEURO: Normal strength and tone, mentation intact and speech normal  PSYCH: mentation appears normal, affect normal/bright    Diagnostic Test Results:  Results for orders placed or performed in visit on 08/20/18 (from the past 24 hour(s))   Hemoglobin   Result Value Ref Range    Hemoglobin 13.4 11.7 - 15.7 g/dL       ASSESSMENT/PLAN:   1. Encounter for gynecological examination without abnormal finding      2. ADHD (attention deficit hyperactivity disorder), inattentive type  Continues on very low dose of ritalin. Discussed again the option to do genesight testing as she may be a slow metabolizer. Does have fewer symptoms on this lower dose. We discussed option to take with food instead of on an empty stomach. If she has further episodes of palpitations, she will let me know.  - " "methylphenidate (RITALIN) 5 MG tablet; Take 0.5-1 tablets (2.5-5 mg) by mouth 2 times daily  Dispense: 30 tablet; Refill: 0    3. History of iron deficiency anemia  Recheck lab today. Doing well on OCP For now.  - Hemoglobin  - Iron and iron binding capacity  - Ferritin    4. History of bee sting allergy  Refilled.  - EPINEPHrine (EPIPEN/ADRENACLICK/OR ANY BX GENERIC EQUIV) 0.3 MG/0.3ML injection 2-pack; Inject 0.3 mLs (0.3 mg) into the muscle as needed for anaphylaxis Patient will call when needs filled.  Dispense: 0.6 mL; Refill: 11    COUNSELING:  Reviewed preventive health counseling, as reflected in patient instructions    BP Readings from Last 1 Encounters:   08/20/18 100/68     Estimated body mass index is 23.53 kg/(m^2) as calculated from the following:    Height as of this encounter: 5' 4\" (1.626 m).    Weight as of this encounter: 137 lb 1.6 oz (62.2 kg).           reports that she has never smoked. She has never used smokeless tobacco.      Counseling Resources:  ATP IV Guidelines  Pooled Cohorts Equation Calculator  Breast Cancer Risk Calculator  FRAX Risk Assessment  ICSI Preventive Guidelines  Dietary Guidelines for Americans, 2010  USDA's MyPlate  ASA Prophylaxis  Lung CA Screening    Nadine Villarreal MD  [facility]  "

## 2018-08-21 LAB
FERRITIN SERPL-MCNC: 16 NG/ML (ref 12–150)
IRON SATN MFR SERPL: 12 % (ref 15–46)
IRON SERPL-MCNC: 50 UG/DL (ref 35–180)
TIBC SERPL-MCNC: 412 UG/DL (ref 240–430)

## 2018-08-22 LAB
C TRACH DNA SPEC QL NAA+PROBE: NEGATIVE
N GONORRHOEA DNA SPEC QL NAA+PROBE: NEGATIVE
SPECIMEN SOURCE: NORMAL
SPECIMEN SOURCE: NORMAL

## 2018-08-24 LAB
COPATH REPORT: NORMAL
PAP: NORMAL

## 2018-10-20 DIAGNOSIS — F32.81 PMDD (PREMENSTRUAL DYSPHORIC DISORDER): ICD-10-CM

## 2018-10-20 NOTE — TELEPHONE ENCOUNTER
"Requested Prescriptions   Pending Prescriptions Disp Refills     drospirenone-ethinyl estradiol (HAKAN) 3-0.03 MG per tablet  Last Written Prescription Date: 11/22/2017  Last Fill Quantity: 84 tablet,  # refills: 3   Last office visit: 8/20/2018 with prescribing provider:  Nadine Villarreal MD    Future Office Visit:     84 tablet 3     Sig: Take 1 tablet by mouth daily    Contraceptives Protocol Passed    10/20/2018 10:24 AM       Passed - Patient is not a current smoker if age is 35 or older       Passed - Recent (12 mo) or future (30 days) visit within the authorizing provider's specialty    Patient had office visit in the last 12 months or has a visit in the next 30 days with authorizing provider or within the authorizing provider's specialty.  See \"Patient Info\" tab in inbasket, or \"Choose Columns\" in Meds & Orders section of the refill encounter.             Passed - No active pregnancy on record       Passed - No positive pregnancy test in past 12 months          "

## 2018-10-22 RX ORDER — DROSPIRENONE AND ETHINYL ESTRADIOL 0.03MG-3MG
1 KIT ORAL DAILY
Qty: 84 TABLET | Refills: 2 | Status: SHIPPED | OUTPATIENT
Start: 2018-10-22 | End: 2019-09-10

## 2018-10-22 NOTE — TELEPHONE ENCOUNTER
Prescription approved per [facility] Refill Protocol.  Emy Phoenix RN

## 2019-03-19 ENCOUNTER — MYC MEDICAL ADVICE (OUTPATIENT)
Dept: PEDIATRICS | Facility: CLINIC | Age: 24
End: 2019-03-19

## 2019-07-09 ENCOUNTER — OFFICE VISIT (OUTPATIENT)
Dept: PEDIATRICS | Facility: CLINIC | Age: 24
End: 2019-07-09
Payer: COMMERCIAL

## 2019-07-09 VITALS
WEIGHT: 141 LBS | BODY MASS INDEX: 24.07 KG/M2 | SYSTOLIC BLOOD PRESSURE: 98 MMHG | OXYGEN SATURATION: 99 % | DIASTOLIC BLOOD PRESSURE: 72 MMHG | HEART RATE: 97 BPM | TEMPERATURE: 98.7 F | HEIGHT: 64 IN

## 2019-07-09 DIAGNOSIS — B96.89 BV (BACTERIAL VAGINOSIS): Primary | ICD-10-CM

## 2019-07-09 DIAGNOSIS — N76.0 ACUTE VAGINITIS: ICD-10-CM

## 2019-07-09 DIAGNOSIS — N76.0 BV (BACTERIAL VAGINOSIS): Primary | ICD-10-CM

## 2019-07-09 LAB
ALBUMIN UR-MCNC: NEGATIVE MG/DL
APPEARANCE UR: CLEAR
BACTERIA #/AREA URNS HPF: ABNORMAL /HPF
BILIRUB UR QL STRIP: NEGATIVE
COLOR UR AUTO: YELLOW
GLUCOSE UR STRIP-MCNC: NEGATIVE MG/DL
HGB UR QL STRIP: ABNORMAL
KETONES UR STRIP-MCNC: NEGATIVE MG/DL
LEUKOCYTE ESTERASE UR QL STRIP: ABNORMAL
NITRATE UR QL: NEGATIVE
PH UR STRIP: 5.5 PH (ref 5–7)
RBC #/AREA URNS AUTO: ABNORMAL /HPF
SOURCE: ABNORMAL
SP GR UR STRIP: 1.02 (ref 1–1.03)
SPECIMEN SOURCE: ABNORMAL
UROBILINOGEN UR STRIP-ACNC: 0.2 EU/DL (ref 0.2–1)
WBC #/AREA URNS AUTO: ABNORMAL /HPF
WET PREP SPEC: ABNORMAL

## 2019-07-09 PROCEDURE — 99214 OFFICE O/P EST MOD 30 MIN: CPT | Performed by: PHYSICIAN ASSISTANT

## 2019-07-09 PROCEDURE — 81001 URINALYSIS AUTO W/SCOPE: CPT | Performed by: PHYSICIAN ASSISTANT

## 2019-07-09 PROCEDURE — 87210 SMEAR WET MOUNT SALINE/INK: CPT | Performed by: PHYSICIAN ASSISTANT

## 2019-07-09 RX ORDER — CLINDAMYCIN HCL 300 MG
300 CAPSULE ORAL 2 TIMES DAILY
Qty: 14 CAPSULE | Refills: 0 | Status: SHIPPED | OUTPATIENT
Start: 2019-07-09 | End: 2020-12-03

## 2019-07-09 ASSESSMENT — MIFFLIN-ST. JEOR: SCORE: 1384.57

## 2019-07-09 NOTE — PROGRESS NOTES
"Subjective     Mariposa Nunez is a 24 year old female who presents to clinic today for the following health issues:    HPI   Vaginal Symptoms    Onset: two weeks    Description:  Vaginal Discharge: creamy   Itching (Pruritis): YES  Burning sensation:  YES  Odor: YES    Accompanying Signs & Symptoms:  Pain with Urination: YES- stings when urinating   Abdominal Pain: no   Fever: no     History:   Sexually active: YES  No STD concerns  New Partner: no   Possibility of Pregnancy:  No    Precipitating factors:     Recent Antibiotic use: flagyl x one week    History of BV and used flagyl in past with success    Alleviating factors:  none  Therapies Tried and outcome:     Review of Systems   ROS COMP: Constitutional, HEENT, cardiovascular, pulmonary, gi and gu systems are negative, except as otherwise noted.      Objective    BP 98/72 (BP Location: Right arm, Patient Position: Sitting, Cuff Size: Adult Regular)   Pulse 97   Temp 98.7  F (37.1  C) (Oral)   Ht 1.626 m (5' 4\")   Wt 64 kg (141 lb)   LMP 06/18/2019 (Approximate)   SpO2 99%   BMI 24.20 kg/m    Body mass index is 24.2 kg/m .  Physical Exam   GENERAL: healthy, alert and no distress   (female): external genitalia with diffuse erythema and scaling. Yellow crusty discharge noted.    Diagnostic Test Results:  No results found for this or any previous visit (from the past 24 hour(s)).        Assessment & Plan   (N76.0,  B96.89) BV (bacterial vaginosis)  (primary encounter diagnosis)  Comment: failure with flagyl. Begin clindamycin as directed. Take with food. Begin probiotics daily x 2-3 months. Avoid intercourse.  Plan: Wet prep, clindamycin (CLEOCIN) 300 MG capsule            (N76.0) Acute vaginitis  Comment: apply OTC monistat to external genitalia x one week for relief.   Plan: Wet prep, UA with Microscopic reflex to Culture            Zena Sanchez PA-C  [facility]    "

## 2019-07-09 NOTE — PATIENT INSTRUCTIONS
Begin clindamycin twice daily x 7 days  Take with food  Drink lots of fluids    Begin probiotic --floragen daily x 2 months     OTC monistat 7 day

## 2019-09-09 DIAGNOSIS — F32.81 PMDD (PREMENSTRUAL DYSPHORIC DISORDER): ICD-10-CM

## 2019-09-09 NOTE — TELEPHONE ENCOUNTER
"Requested Prescriptions   Pending Prescriptions Disp Refills     drospirenone-ethinyl estradiol (HAKAN) 3-0.03 MG tablet    Last Written Prescription Date:  10/22/2018  Last Fill Quantity: 84,  # refills: 2   Last office visit: 7/9/2019 with prescribing provider:  Nadine Villarreal     Future Office Visit:     84 tablet 2     Sig: Take 1 tablet by mouth daily       Contraceptives Protocol Passed - 9/9/2019  9:54 AM        Passed - Patient is not a current smoker if age is 35 or older        Passed - Recent (12 mo) or future (30 days) visit within the authorizing provider's specialty     Patient had office visit in the last 12 months or has a visit in the next 30 days with authorizing provider or within the authorizing provider's specialty.  See \"Patient Info\" tab in inbasket, or \"Choose Columns\" in Meds & Orders section of the refill encounter.              Passed - Medication is active on med list        Passed - No active pregnancy on record        Passed - No positive pregnancy test in past 12 months            "

## 2019-09-10 RX ORDER — DROSPIRENONE AND ETHINYL ESTRADIOL 0.03MG-3MG
1 KIT ORAL DAILY
Qty: 30 TABLET | Refills: 0 | Status: SHIPPED | OUTPATIENT
Start: 2019-09-10 | End: 2020-12-03

## 2019-09-10 NOTE — TELEPHONE ENCOUNTER
Medication is being filled for 1 time refill only due to:  Patient needs to be seen because due for OV.   Message added in notes to pharmacy: Need appointment in clinic for further refills

## 2019-11-04 ENCOUNTER — HEALTH MAINTENANCE LETTER (OUTPATIENT)
Age: 24
End: 2019-11-04

## 2019-12-05 ENCOUNTER — TRANSFERRED RECORDS (OUTPATIENT)
Dept: HEALTH INFORMATION MANAGEMENT | Facility: CLINIC | Age: 24
End: 2019-12-05

## 2020-11-22 ENCOUNTER — HEALTH MAINTENANCE LETTER (OUTPATIENT)
Age: 25
End: 2020-11-22

## 2020-12-01 ENCOUNTER — TRANSFERRED RECORDS (OUTPATIENT)
Dept: HEALTH INFORMATION MANAGEMENT | Facility: CLINIC | Age: 25
End: 2020-12-01

## 2020-12-02 NOTE — PROGRESS NOTES
Mariposa is a 25 year old G0 female who presents for annual exam.     Besides routine health maintenance,  she would like to discuss about pain during intercourse, insertion of a speculum and inserting tampons.    HPI:  Mariposa presents for an annual GYN exam. Her last pap smear was in 2018 in [place]. She has had a prolonged history of vaginal pain since menarche. She had pain on insertion and removal of tampons with burning at the vaginal opening. She was placed on OCPs for menses control one year later and continues to have those symptoms although now she only has pain when she removes the tampon. She has also had insertional dyspareunia since coitarche and it has been with the same partner. She was previously evaluated in [place] and had a lot of vulvar erythema and irritation. She had a vulvar biopsy and only chronic inflammation was seen without a skin diagnosis per se. She uses lubricant with intercourse but has burning pain throughout. She has noted an improvement in her symptoms when she uses cotton underwear. She has been screened for STDs since being with this partner.  She has no bowel issues. No constipation or diarrhea. She has no irritative voiding symptoms. She is very anxious about a pelvic exam due to the pain. She has never been to a Pelvic floor physical therapist.       GYNECOLOGIC HISTORY:    Patient's last menstrual period was 11/19/2020 (within days).    Regular menses? yes  Menses every 32 days.  Length of menses: 3 days    Her current contraception method is: oral contraceptives.  She  reports that she has never smoked. She has never used smokeless tobacco.    Patient is sexually active.  STD testing offered?  Declined  Last PHQ-9 score on record =   PHQ-9 SCORE 12/3/2020   PHQ-9 Total Score 0     Last GAD7 score on record =   KELLY-7 SCORE 12/3/2020   Total Score 1         HEALTH MAINTENANCE:  Cholesterol: (No results found for: CHOL)  Last Mammo: Not applicable, Next Mammo: Due at age 42    Pap:   Lab Results   Component Value Date    PAP NIL 2018      Colonoscopy:  NA, Next Colonoscopy: age 52.  Dexa:  NA    Health maintenance updated:  yes    HISTORY:  OB History    Para Term  AB Living   0 0 0 0 0 0   SAB TAB Ectopic Multiple Live Births   0 0 0 0 0       Patient Active Problem List   Diagnosis     Anxiety state     History of bee sting allergy     ADHD (attention deficit hyperactivity disorder), inattentive type     Syncope, unspecified syncope type     Appendicitis     Dyspareunia, female     Past Surgical History:   Procedure Laterality Date     ENDOSCOPY       LAPAROSCOPIC APPENDECTOMY N/A 2018    Procedure: LAPAROSCOPIC APPENDECTOMY;  Laparoscopic appendectomy ;  Surgeon: Nura Hyman MD;  Location: [facility] OR      Social History     Tobacco Use     Smoking status: Never Smoker     Smokeless tobacco: Never Used   Substance Use Topics     Alcohol use: Yes     Comment: occa      Problem (# of Occurrences) Relation (Name,Age of Onset)    Asthma (2) Mother, Sister    Cerebrovascular Disease (1) Paternal Grandmother    Congenital Anomalies (1) Sister    Family History Negative (1) Father    Hypertension (2) Mother, Other: also grandparents and uncle    Lipids (1) Other: grandparents    Lupus (1) Maternal Aunt    Neurologic Disorder (1) Other: grandmother, aunts and uncles            Current Outpatient Medications   Medication Sig     drospirenone-ethinyl estradiol (HAKAN) 3-0.03 MG tablet Take 1 tablet by mouth daily     EPINEPHrine (EPIPEN/ADRENACLICK/OR ANY BX GENERIC EQUIV) 0.3 MG/0.3ML injection 2-pack Inject 0.3 mLs (0.3 mg) into the muscle as needed for anaphylaxis Patient will call when needs filled.     No current facility-administered medications for this visit.      Allergies   Allergen Reactions     Bee Venom      Bees      Severe swelling at sting site     Dust Mites      [place]     Hpv Vaccine Recombinant (Yeast Derived) [Quadrivalent Hpv, 6,11,16,18]   "    Sores in mouth and vagina     No Clinical Screening - See Comments Nausea and Itching     Bell peppers       Past medical, surgical, social and family histories were reviewed and updated in EPIC.    ROS:   12 point review of systems negative other than symptoms noted below or in the HPI.  Genitourinary: Painful Mazon  No urinary frequency or dysuria, bladder or kidney problems    EXAM:  /86 (BP Location: Right arm, Patient Position: Sitting, Cuff Size: Adult Regular)   Pulse 100   Ht 1.626 m (5' 4\")   Wt 68.9 kg (151 lb 12.8 oz)   LMP 11/19/2020 (Within Days)   Breastfeeding No   BMI 26.06 kg/m     BMI: Body mass index is 26.06 kg/m .    PHYSICAL EXAM:  Constitutional:   Appearance: Well nourished, well developed, alert, in no acute distress  Neck:  Lymph Nodes:  No lymphadenopathy present    Thyroid:  Gland size normal, nontender, no nodules or masses present  on palpation  Chest:  Respiratory Effort:  Breathing unlabored, CTAB  Cardiovascular:    Heart: Auscultation:  Regular rate, normal rhythm, no murmurs present  Breasts: Inspection of Breasts:  No lymphadenopathy present., Palpation of Breasts and Axillae:  No masses present on palpation, no breast tenderness., Axillary Lymph Nodes:  No lymphadenopathy present. and No nodularity, asymmetry or nipple discharge bilaterally.  Gastrointestinal:   Abdominal Examination:  Abdomen nontender to palpation, tone normal without rigidity or guarding, no masses present, umbilicus without lesions   Liver and Spleen:  No hepatomegaly present, liver nontender to palpation    Hernias:  No hernias present  Lymphatic: Lymph Nodes:  No other lymphadenopathy present  Skin:  General Inspection:  No rashes present, no lesions present, no areas of  discoloration  Neurologic:    Mental Status:  Oriented X3.  Normal strength and tone, sensory exam                grossly normal, mentation intact and speech normal.    Psychiatric:   Mentation appears normal and " affect normal/bright.         Pelvic Exam:  External Genitalia:     Normal appearance for age, thick white discharge present, no tenderness present on Q tip evaluation, no inflammatory lesions present, color normal  Vagina:     Unable to tolerate speculum. Tender on palpation of anterior and posterior vaginal wall with thin swab. Burning sensation of advancement of eswab for yeast culture collection.   Bladder:     Non-tender to abdominal palpation but tender on Q tip test  Urethra:   Urethral Body:  Urethra palpation normal, urethra structural support normal   Urethral Meatus:  No erythema or lesions present  Cervix:     Unable to tolerate speculum  Uterus:     Unable to assess  Adnexa:     Unable to assess  Perineum:     Perineum within normal limits, no evidence of trauma, no rashes or skin lesions present    Pubic Hair:     Normal pubic hair distribution for age        COUNSELING:   Reviewed preventive health counseling, as reflected in patient instructions    BMI: Body mass index is 26.06 kg/m .      ASSESSMENT:  25 year old female with satisfactory annual exam.    ICD-10-CM    1. Encounter for gynecological examination without abnormal finding  Z01.419    2. PMDD (premenstrual dysphoric disorder)  F32.81 drospirenone-ethinyl estradiol (HAKAN) 3-0.03 MG tablet   3. Dyspareunia, female  N94.10 Yeast culture     [facility], [facility]OPRACTIC REFERRAL       PLAN:  Pap smear not due until 2021.  Ok to eventually continue on OCP as she has been on it for a prolonged period with no complications to date.  Given her vaginal symptoms, Mariposa was willing to try one month without OCPs to see if her symptoms improve. If her symptoms do not improve she will restart it. If her symptoms improve I would recommend another contraceptive choice: IUD or progesterone implant.  She is open to pelvic floor physical therapy as well. We discussed medical adjuvants as she has global vaginal burning and not vulvodynia. I also   recommended ruling out a yeast vaginitis.    Jacqueline Flannery MD

## 2020-12-03 ENCOUNTER — OFFICE VISIT (OUTPATIENT)
Dept: OBGYN | Facility: CLINIC | Age: 25
End: 2020-12-03
Payer: COMMERCIAL

## 2020-12-03 VITALS
SYSTOLIC BLOOD PRESSURE: 122 MMHG | DIASTOLIC BLOOD PRESSURE: 86 MMHG | HEIGHT: 64 IN | BODY MASS INDEX: 25.92 KG/M2 | WEIGHT: 151.8 LBS | HEART RATE: 100 BPM

## 2020-12-03 DIAGNOSIS — Z01.419 ENCOUNTER FOR GYNECOLOGICAL EXAMINATION WITHOUT ABNORMAL FINDING: Primary | ICD-10-CM

## 2020-12-03 DIAGNOSIS — N94.10 DYSPAREUNIA, FEMALE: ICD-10-CM

## 2020-12-03 DIAGNOSIS — F32.81 PMDD (PREMENSTRUAL DYSPHORIC DISORDER): ICD-10-CM

## 2020-12-03 PROCEDURE — 99213 OFFICE O/P EST LOW 20 MIN: CPT | Mod: 25 | Performed by: OBSTETRICS & GYNECOLOGY

## 2020-12-03 PROCEDURE — 99395 PREV VISIT EST AGE 18-39: CPT | Performed by: OBSTETRICS & GYNECOLOGY

## 2020-12-03 PROCEDURE — 87102 FUNGUS ISOLATION CULTURE: CPT | Performed by: OBSTETRICS & GYNECOLOGY

## 2020-12-03 RX ORDER — DROSPIRENONE AND ETHINYL ESTRADIOL 0.03MG-3MG
1 KIT ORAL DAILY
Qty: 84 TABLET | Refills: 4 | Status: SHIPPED | OUTPATIENT
Start: 2020-12-03 | End: 2021-05-26

## 2020-12-03 ASSESSMENT — PATIENT HEALTH QUESTIONNAIRE - PHQ9
5. POOR APPETITE OR OVEREATING: NOT AT ALL
SUM OF ALL RESPONSES TO PHQ QUESTIONS 1-9: 0

## 2020-12-03 ASSESSMENT — ANXIETY QUESTIONNAIRES
5. BEING SO RESTLESS THAT IT IS HARD TO SIT STILL: NOT AT ALL
6. BECOMING EASILY ANNOYED OR IRRITABLE: NOT AT ALL
1. FEELING NERVOUS, ANXIOUS, OR ON EDGE: SEVERAL DAYS
IF YOU CHECKED OFF ANY PROBLEMS ON THIS QUESTIONNAIRE, HOW DIFFICULT HAVE THESE PROBLEMS MADE IT FOR YOU TO DO YOUR WORK, TAKE CARE OF THINGS AT HOME, OR GET ALONG WITH OTHER PEOPLE: NOT DIFFICULT AT ALL
7. FEELING AFRAID AS IF SOMETHING AWFUL MIGHT HAPPEN: NOT AT ALL
3. WORRYING TOO MUCH ABOUT DIFFERENT THINGS: NOT AT ALL
GAD7 TOTAL SCORE: 1
2. NOT BEING ABLE TO STOP OR CONTROL WORRYING: NOT AT ALL

## 2020-12-03 ASSESSMENT — MIFFLIN-ST. JEOR: SCORE: 1428.56

## 2020-12-04 ASSESSMENT — ANXIETY QUESTIONNAIRES: GAD7 TOTAL SCORE: 1

## 2020-12-07 LAB
Lab: NORMAL
SPECIMEN SOURCE: NORMAL
YEAST SPEC QL CULT: NORMAL

## 2021-05-26 ENCOUNTER — OFFICE VISIT (OUTPATIENT)
Dept: PEDIATRICS | Facility: CLINIC | Age: 26
End: 2021-05-26
Payer: COMMERCIAL

## 2021-05-26 VITALS
DIASTOLIC BLOOD PRESSURE: 78 MMHG | TEMPERATURE: 98.3 F | OXYGEN SATURATION: 100 % | HEIGHT: 64 IN | WEIGHT: 148 LBS | SYSTOLIC BLOOD PRESSURE: 122 MMHG | HEART RATE: 93 BPM | BODY MASS INDEX: 25.27 KG/M2

## 2021-05-26 DIAGNOSIS — Z00.00 ROUTINE GENERAL MEDICAL EXAMINATION AT A HEALTH CARE FACILITY: Primary | ICD-10-CM

## 2021-05-26 DIAGNOSIS — K21.9 GASTROESOPHAGEAL REFLUX DISEASE WITHOUT ESOPHAGITIS: ICD-10-CM

## 2021-05-26 PROBLEM — R55 SYNCOPE, UNSPECIFIED SYNCOPE TYPE: Status: RESOLVED | Noted: 2017-06-21 | Resolved: 2021-05-26

## 2021-05-26 PROBLEM — K37 APPENDICITIS: Status: RESOLVED | Noted: 2018-01-01 | Resolved: 2021-05-26

## 2021-05-26 PROCEDURE — 99213 OFFICE O/P EST LOW 20 MIN: CPT | Mod: 25 | Performed by: INTERNAL MEDICINE

## 2021-05-26 PROCEDURE — 99395 PREV VISIT EST AGE 18-39: CPT | Performed by: INTERNAL MEDICINE

## 2021-05-26 ASSESSMENT — ENCOUNTER SYMPTOMS
JOINT SWELLING: 0
COUGH: 0
CONSTIPATION: 0
SORE THROAT: 0
MYALGIAS: 0
PARESTHESIAS: 0
NAUSEA: 0
HEARTBURN: 1
DIARRHEA: 0
CHILLS: 0
HEMATURIA: 0
PALPITATIONS: 0
HEADACHES: 0
FREQUENCY: 0
EYE PAIN: 0
DIZZINESS: 0
BREAST MASS: 0
DYSURIA: 0
ARTHRALGIAS: 0
NERVOUS/ANXIOUS: 0
FEVER: 0
SHORTNESS OF BREATH: 0
WEAKNESS: 0
ABDOMINAL PAIN: 0
HEMATOCHEZIA: 0

## 2021-05-26 ASSESSMENT — MIFFLIN-ST. JEOR: SCORE: 1406.32

## 2021-05-26 NOTE — PATIENT INSTRUCTIONS
Pepcid (famotidine)   Start with 10 mg twice per day   Increase to 20 mg twice per day if the 10 mg dose doesn't fully control your symptoms      Preventive Health Recommendations      Yearly exam:     See your health care provider every year in order to  o Review health changes.   o Discuss preventive care.    o Review your medicines if your doctor has prescribed any.      Get a Pap test every three years. If you have an abnormal result, your doctor may have you test more often.    Shots:     Get a flu shot each year.     Get a tetanus shot every 10 years.     Consider getting the shot (vaccine) that prevents cervical cancer (Gardasil).    Nutrition:     Eat at least 5 servings of fruits and vegetables each day.    Eat whole-grain bread, whole-wheat pasta and brown rice instead of white grains and rice.    Get adequate Calcium and Vitamin D.     Lifestyle    Exercise at least 150 minutes a week each week (30 minutes a day, 5 days a week). This will help you control your weight and prevent disease.    Limit alcohol to one drink per day.    No smoking.     Wear sunscreen to prevent skin cancer.    See your dentist every six months for an exam and cleaning.

## 2021-05-26 NOTE — PROGRESS NOTES
SUBJECTIVE:   CC: Mariposa Nunez is an 26 year old woman who presents for preventive health visit.     Patient has been advised of split billing requirements and indicates understanding: Yes  Healthy Habits:     Getting at least 3 servings of Calcium per day:  Yes    Bi-annual eye exam:  NO    Dental care twice a year:  Yes    Sleep apnea or symptoms of sleep apnea:  None    Diet:  Other    Frequency of exercise:  2-3 days/week    Duration of exercise:  30-45 minutes    Taking medications regularly:  Yes    Medication side effects:  Not applicable    PHQ-2 Total Score: 0    Additional concerns today:  No    Intermittent JUAN DAVID sx.  EGD done 2018.  Was taking ranitidine, stopped d/t recall.  Having sx, typically a few times per week.  TUMS prn does help.  With frequency, discussed taking a med on a regular basis.       Today's PHQ-2 Score:   PHQ-2 ( 1999 Pfizer) 5/26/2021   Q1: Little interest or pleasure in doing things 0   Q2: Feeling down, depressed or hopeless 0   PHQ-2 Score 0   Q1: Little interest or pleasure in doing things Not at all   Q2: Feeling down, depressed or hopeless Not at all   PHQ-2 Score 0       Abuse: Current or Past (Physical, Sexual or Emotional) - No  Do you feel safe in your environment? Yes    Have you ever done Advance Care Planning? (For example, a Health Directive, POLST, or a discussion with a medical provider or your loved ones about your wishes): No, advance care planning information given to patient to review.  Patient declined advance care planning discussion at this time.    Social History     Tobacco Use     Smoking status: Never Smoker     Smokeless tobacco: Never Used   Substance Use Topics     Alcohol use: Yes     Comment: occa     If you drink alcohol do you typically have >3 drinks per day or >7 drinks per week? No    Alcohol Use 5/26/2021   Prescreen: >3 drinks/day or >7 drinks/week? No   Prescreen: >3 drinks/day or >7 drinks/week? -       Reviewed orders with patient.   "Reviewed health maintenance and updated orders accordingly - Yes      History of abnormal Pap smear: NO - age 21-29 PAP every 3 years recommended  PAP / HPV 8/20/2018   PAP NIL     Review of Systems   Constitutional: Negative for chills and fever.   HENT: Negative for congestion, ear pain, hearing loss and sore throat.    Eyes: Negative for pain and visual disturbance.   Respiratory: Negative for cough and shortness of breath.    Cardiovascular: Negative for chest pain, palpitations and peripheral edema.   Gastrointestinal: Positive for heartburn. Negative for abdominal pain, constipation, diarrhea, hematochezia and nausea.   Breasts:  Negative for tenderness, breast mass and discharge.   Genitourinary: Negative for dysuria, frequency, genital sores, hematuria, pelvic pain, urgency, vaginal bleeding and vaginal discharge.   Musculoskeletal: Negative for arthralgias, joint swelling and myalgias.   Skin: Negative for rash.   Neurological: Negative for dizziness, weakness, headaches and paresthesias.   Psychiatric/Behavioral: Negative for mood changes. The patient is not nervous/anxious.         OBJECTIVE:   /78   Pulse 93   Temp 98.3  F (36.8  C) (Tympanic)   Ht 1.626 m (5' 4\")   Wt 67.1 kg (148 lb)   LMP 05/03/2021   SpO2 100%   BMI 25.40 kg/m    Physical Exam  GENERAL: healthy, alert and no distress  EYES: Eyes grossly normal to inspection, PERRL and conjunctivae and sclerae normal  HENT: ear canals and TM's normal  NECK: no adenopathy, no asymmetry  RESP: lungs clear to auscultation - no rales, rhonchi or wheezes  CV: regular rate and rhythm, normal S1 S2, no murmur, click or rub, no peripheral edema and peripheral pulses strong  ABDOMEN: soft, nontender, bowel sounds normal  MS: no gross musculoskeletal defects noted, no edema  SKIN: no suspicious lesions or rashes  NEURO: Normal strength and tone, mentation intact and speech normal  PSYCH: mentation appears normal, affect " "normal/bright      ASSESSMENT/PLAN:       ICD-10-CM    1. Routine general medical examination at a health care facility  Z00.00    2. Gastroesophageal reflux disease without esophagitis  K21.9     Recommend OTC famotidine, start with 10 mg but increase to 20 mg if needed.       COUNSELING:  Reviewed preventive health counseling, as reflected in patient instructions    Estimated body mass index is 25.4 kg/m  as calculated from the following:    Height as of this encounter: 1.626 m (5' 4\").    Weight as of this encounter: 67.1 kg (148 lb).      She reports that she has never smoked. She has never used smokeless tobacco.      Duong Myers MD  [facility]  "

## 2021-08-12 ENCOUNTER — TRANSFERRED RECORDS (OUTPATIENT)
Dept: HEALTH INFORMATION MANAGEMENT | Facility: CLINIC | Age: 26
End: 2021-08-12

## 2021-09-15 ENCOUNTER — TRANSFERRED RECORDS (OUTPATIENT)
Dept: HEALTH INFORMATION MANAGEMENT | Facility: CLINIC | Age: 26
End: 2021-09-15

## 2021-09-18 ENCOUNTER — HEALTH MAINTENANCE LETTER (OUTPATIENT)
Age: 26
End: 2021-09-18

## 2022-01-17 LAB — PAP-ABSTRACT: NORMAL

## 2022-01-24 NOTE — PROGRESS NOTES
SUBJECTIVE:   Mariposa Nunez is a 22 year old female who presents to clinic today for the following health issues:      Medication Followup     Taking Medication as prescribed: yes, did not like Adderall, would like to start methylphenidate again    Side Effects:  None    Medication Helping Symptoms:  yes       Problem list and histories reviewed & adjusted, as indicated.  Additional history: as documented    Patient Active Problem List   Diagnosis     Anxiety state     History of bee sting allergy     ADHD (attention deficit hyperactivity disorder), inattentive type     Syncope, unspecified syncope type     History reviewed. No pertinent surgical history.    Social History   Substance Use Topics     Smoking status: Never Smoker     Smokeless tobacco: Never Used     Alcohol use No     Family History   Problem Relation Age of Onset     Neurologic Disorder Other      grandmother, aunts and uncles     Asthma Mother      Hypertension Mother      Asthma Sister      Congenital Anomalies Sister      Hypertension Other      also grandparents and uncle     Lipids Other      grandparents     Family History Negative Father      Lupus Maternal Aunt          Recent Labs   Lab Test  05/15/17   1225 04/25/17 07/26/16   1411   A1C   --   5.0   --    ALT  18  12  20   CR  0.67  0.91  0.65   GFRESTIMATED  >90  Non  GFR Calc    >60  >90  Non  GFR Calc     GFRESTBLACK  >90   GFR Calc    >60  >90   GFR Calc     POTASSIUM  4.4  4.0  3.9   TSH  1.51  4.00   --       BP Readings from Last 3 Encounters:   08/21/17 106/62   06/23/17 102/70   05/15/17 110/68    Wt Readings from Last 3 Encounters:   08/21/17 138 lb 4.8 oz (62.7 kg)   06/23/17 138 lb (62.6 kg)   05/15/17 138 lb 1.6 oz (62.6 kg)           Reviewed and updated as needed this visit by clinical staff       Reviewed and updated as needed this visit by Provider         ROS:  Constitutional, HEENT, cardiovascular,  Order placed for PFT. Regarding pulmonary rehab referral:    WAYNE Webster  The order will remain in the work queue. I will keep a look out for her PFT and contact her once the PFT is done.     Thanks!      "pulmonary, GI, , musculoskeletal, neuro, skin, endocrine and psych systems are negative, except as otherwise noted.      OBJECTIVE:   /62 (Cuff Size: Adult Regular)  Pulse 86  Temp 98.2  F (36.8  C) (Oral)  Ht 5' 3.78\" (1.62 m)  Wt 138 lb 4.8 oz (62.7 kg)  BMI 23.9 kg/m2  Body mass index is 23.9 kg/(m^2).  GENERAL: healthy, alert and no distress  NECK: no adenopathy, no asymmetry, masses, or scars and thyroid normal to palpation  RESP: lungs clear to auscultation - no rales, rhonchi or wheezes  CV: regular rate and rhythm, normal S1 S2, no S3 or S4, no murmur, click or rub    Diagnostic Test Results:  Results for orders placed or performed in visit on 08/21/17   CBC with platelets   Result Value Ref Range    WBC 8.3 4.0 - 11.0 10e9/L    RBC Count 4.59 3.8 - 5.2 10e12/L    Hemoglobin 12.3 11.7 - 15.7 g/dL    Hematocrit 37.8 35.0 - 47.0 %    MCV 82 78 - 100 fl    MCH 26.8 26.5 - 33.0 pg    MCHC 32.5 31.5 - 36.5 g/dL    RDW 13.5 10.0 - 15.0 %    Platelet Count 386 150 - 450 10e9/L   Iron and iron binding capacity   Result Value Ref Range    Iron 32 (L) 35 - 180 ug/dL    Iron Binding Cap 472 (H) 240 - 430 ug/dL    Iron Saturation Index 7 (L) 15 - 46 %       ASSESSMENT/PLAN:     1. Iron deficiency anemia, unspecified iron deficiency anemia type  Plan recheck labs. Follow up as needed.  - CBC with platelets  - Iron and iron binding capacity    2. ADHD (attention deficit hyperactivity disorder), inattentive type  Trial on ritalin IR so she can adjust dosing to when she has her college classes.  - methylphenidate (RITALIN) 5 MG tablet; Take 1 tablet (5 mg) by mouth 2 times daily  Dispense: 60 tablet; Refill: 0    Patient Instructions   Come in for a nurse visit when you are home over fall break to check blood pressure and pulse. Then we can meet when you are [facility] at [facility].  We can do a phone visit ir e-visit if we need to change medication while you are at school or if you are having any issues.    I'll " let you know how your iron levels look. If things look good, we can recheck your blood tests when you are home in the fall.      Nadine Ayon MD  [facility]

## 2022-02-22 ENCOUNTER — TRANSFERRED RECORDS (OUTPATIENT)
Dept: HEALTH INFORMATION MANAGEMENT | Facility: CLINIC | Age: 27
End: 2022-02-22
Payer: COMMERCIAL

## 2022-05-10 ENCOUNTER — TRANSFERRED RECORDS (OUTPATIENT)
Dept: MULTI SPECIALTY CLINIC | Facility: CLINIC | Age: 27
End: 2022-05-10

## 2022-05-10 LAB
ALT SERPL-CCNC: 6 IU/L (ref 8–45)
AST SERPL-CCNC: 15 IU/L (ref 2–40)
CREATININE (EXTERNAL): 0.78 MG/DL (ref 0.57–1.11)
GFR ESTIMATED (EXTERNAL): >90 ML/MIN/1.73M2
GLUCOSE (EXTERNAL): 82 MG/DL (ref 65–100)
POTASSIUM (EXTERNAL): 4 MMOL/L (ref 3.5–5)
TSH SERPL-ACNC: 1.28 UIU/ML (ref 0.35–4.94)

## 2022-05-11 ENCOUNTER — TELEPHONE (OUTPATIENT)
Dept: INFECTIOUS DISEASES | Facility: CLINIC | Age: 27
End: 2022-05-11

## 2022-05-11 ENCOUNTER — TELEPHONE (OUTPATIENT)
Dept: INFECTIOUS DISEASES | Facility: CLINIC | Age: 27
End: 2022-05-11
Payer: COMMERCIAL

## 2022-05-11 ENCOUNTER — VIRTUAL VISIT (OUTPATIENT)
Dept: INFECTIOUS DISEASES | Facility: CLINIC | Age: 27
End: 2022-05-11
Attending: PEDIATRICS
Payer: COMMERCIAL

## 2022-05-11 DIAGNOSIS — R00.2 PALPITATION: Primary | ICD-10-CM

## 2022-05-11 DIAGNOSIS — U07.1 2019 NOVEL CORONAVIRUS DISEASE (COVID-19): ICD-10-CM

## 2022-05-11 DIAGNOSIS — R07.9 CHEST PAIN, UNSPECIFIED TYPE: ICD-10-CM

## 2022-05-11 PROCEDURE — G0463 HOSPITAL OUTPT CLINIC VISIT: HCPCS | Mod: TEL,PN,RTG | Performed by: PEDIATRICS

## 2022-05-11 PROCEDURE — 99214 OFFICE O/P EST MOD 30 MIN: CPT | Mod: TEL | Performed by: PEDIATRICS

## 2022-05-11 NOTE — LETTER
2022      RE: Mariposa Nunez  [address] [facility] [address]     Dear Colleague,    Thank you for the opportunity to participate in the care of your patient, Mariposa Nunez, at the [facility] at [facility]. Please see a copy of my visit note below.    [facility]    [facility]  [address], [address]th [place] [address]    Office:  [phone]   Fax:  [phone]         PEDIATRIC INFECTIOUS DISEASES / COVID CLINIC  VIRTUAL VISIT NOTE    Date: May 11, 2022    To:Nadine Villarreal MD  [address] [place] [address]    Pt: Mariposa Nunez  MR: [id number]  : 1995  GRANT: 2022    Dear Dr. Villarreal    I had the pleasure of seeing Mariposa via a virtual visit (with the Pediatric Infectious Diseases Clinic at the [facility]).       Mariposa is a generally healthy 27 year old young woman who is seen at the Pediatric COVID Clinic for an out-patient consultation. I'm talking with both Mariposa and her Dad. Mariposa had COVID in the end of 2022 (likely exposed at home) and had few days of fever (up to 102) and ~2 weeks of fatigue, congestion, headache, and worsening of her reflux symptoms. She recovered and within ~3 weeks was back to her base line. Couple of weeks later she start developed chest pain and shortness of breathe. She was seen at that time at an urgent care and diagnosed with costochondralis, and was prescribed Aleve with improvement in symptoms. At that time she also developed difficulties concentrating (brain fog), which has since improved. In the past couple of weeks she has been experiencing recurrent episodes of palpitation that is associated with dizziness and chest pain. The palpitation can occur at any point, either when she is exercising, but also when she just sits on  the couch. At least on one occasion she woke up at night with fast heart rate. She has a watch that monitor her vitals, and noticed that when her heart is racing the rate can be up to 160-170, but never above 200. These palpitation episodes start abruptly, last for few minutes and then slowly improve. Mariposa has been working out throughout this period, and although at times her palpitation during exercise, she had multiple working out sessions that she could do without any issues, and even if she feels her heart racing, she can slow down for few minutes and then finish her sessions. Another bothersome symptom she is experiencing is frequent diarrhea following meals. She does not report associated significant abdominal pain or discomfort. She lost ~6lbs in the pst few weeks. She still has some fatigue and take more naps than usually, but this is improving. Mariposa was seen by few providers in the past few weeks and had some labs and testing done. EKG was done and was normal (did not have palpitation at the time of EKG), complete blood count and chemistries were also within normal limits. Of note, Mariposa's Mom has history of SVT at her late twenties, which spontaneously resolved. Otherwise she is doing well, without further specific concerns. She is in good spirits and feels that she has most of her energy back and report that her main concern is the occasional fast heart rate and chest pain.     I have reviewed and updated the patient's Past Medical History, Social History, Family History and Medication List.      Review of Systems: The Review of Systems is negative other than noted in the HPI  Past Medical History: I have reviewed this patient's past medical history  Social History: Lives in [place], currently visiting her parents in the [facility].  Immunization:   Immunization History   Administered Date(s) Administered     COVID-19,PF,Moderna 03/17/2021, 04/15/2021, 11/26/2021     DTAP (<7y) 08/08/2002     FLU  6-35 months 12/14/2015, 11/26/2016     HEPA 01/29/2010, 08/30/2010     HPV 01/04/2016     HepA-ped 2 Dose 09/08/2009, 01/29/2010, 08/30/2010     Hpv, Unspecified  01/04/2016     Influenza (H1N1) 01/29/2010     Influenza (IIV3) PF 02/15/2007, 10/20/2008, 11/30/2017     Influenza Intranasal Vaccine 10/19/2007     Influenza Intranasal Vaccine 4 valent (FluMist) 10/19/2007     Influenza Vaccine IM > 6 months Valent IIV4 (Alfuria,Fluzone) 01/17/2014, 11/17/2014, 10/15/2017, 10/07/2020     Influenza Vaccine, 6+MO IM (QUADRIVALENT W/PRESERVATIVES) 11/26/2016     MMR 08/08/2002     Meningococcal (Menactra ) 09/08/2009, 08/05/2015     Meningococcal (Menveo ) 08/05/2015     Poliovirus, inactivated (IPV) 08/08/2002     TDAP Vaccine (Adacel) 12/15/2008, 01/15/2018     Varicella 06/14/2007     Allergies:   Allergies   Allergen Reactions     Bee Venom      Bees      Severe swelling at sting site     Dust Mites      congeston     Hpv Vaccine Recombinant (Yeast Derived) [Quadrivalent Hpv, 6,11,16,18]      Sores in mouth and vagina     Other [No Clinical Screening - See Comments] Nausea and Itching     Bell peppers       Medications:   Current Outpatient Medications   Medication Sig     EPINEPHrine (EPIPEN/ADRENACLICK/OR ANY BX GENERIC EQUIV) 0.3 MG/0.3ML injection 2-pack Inject 0.3 mLs (0.3 mg) into the muscle as needed for anaphylaxis Patient will call when needs filled.     No current facility-administered medications for this visit.        Physical Exam:    I've seen and interacted with the patient directly through the video chat (according to developmental level): Yes.    Pertinent physical findings: Mariposa was awake, alert, and cooperative with me throughout our video exchange. No obvious physical abnormalities or distress were noticed.     Lab: See HPI for verbal report of normal labs.   Will order the following (to be done tomorrow 5/12): ECHO, EKG, troponin, CBC, CMP.      Assessment and plan: 28yo generally healthy young  woman with recurrent palpitations and chest pain ~3m after COVID infection. This presentation is most likely due to lingering autonomic dysregulation following COVID infection. I doubt this is primary cardiac disease (such as myocarditis that may be caused by COVID, or SVT) but I recommend the following work-up to rule-out cardiac pathology: ECHO, EKG, troponin. I also recommend to repeat CBC and CMP. These labs and tests are scheduled for tomorrow (). If any concerning results we will get a cardiology consultation, but otherwise if results are within normal limits, this will make Long Covid is the most likely diagnosis. I discussed this with Mariposa and her Dad and provided few suggestion for ongoing management of Long Covid.       Follow-up appointment was not scheduled.     Of course, if symptoms reoccur or any new issue arise I would be happy to see Mariposa again at clinic sooner.    Please contact me directly with any questions.    Thank you for allowing me to assist in Mariposa's care.     Video-Visit Details    Video Start Time: 3:10p  Video Stop Time: 3:40p    Total time spent, including coordination of care (at the day of the encounter): 30min    Originating Location (pt. Location): Home    Distant Location (provider location):  PEDS INFECTIOUS DISEASE     Mode of Communication:  Video Conference via "[employer]"      Sincerely,    Cb Ryan MD    Pediatric Infectious Diseases/Immunology  [facility]  [facility]  Clinic Coordinator (Yun Lawton): [phone]  Clinic Fax: [phone]  Clinic [phone]    CC  GISELLE LUIS    Copy to patient  HARINI ZAMUDIO DANIEL  [address] [facility] [address]

## 2022-05-11 NOTE — TELEPHONE ENCOUNTER
Spoke with patient regarding echo that Dr. Ryan would like completed. Patient selected appointment date for 5/12/22 at 1 pm. Shayna from peds imaging scheduled and confirmed appointment date/time.       ..Yun Lawton RN on 5/11/2022 at 10:02 AM

## 2022-05-11 NOTE — TELEPHONE ENCOUNTER
Return call from patient confirming virtual appointment for today 5/11/22 at 3 pm will work.       ..Yun Lawton RN on 5/11/2022 at 9:46 AM

## 2022-05-11 NOTE — TELEPHONE ENCOUNTER
Call to patient with finalized plan for tomorrow 5/12/22. Patient to arrive at peds imaging by 12:45 pm for 1 pm echocardiogram. Following Echo Mariposa will proceed to the 12th floor ([facility]) for EKG scheduled for 2 pm and labs scheduled for 2:30 pm. Mariposa verbalized good understanding.      ..Yun Lawton RN on 5/11/2022 at 4:40 PM

## 2022-05-11 NOTE — PROGRESS NOTES
[facility]    [facility]  [address]th [place] [address]    Office:  [phone]   Fax:  [phone]         PEDIATRIC INFECTIOUS DISEASES / COVID CLINIC  VIRTUAL VISIT NOTE    Date: May 11, 2022    To:Nadine Villarreal MD  [address] [place] [address]    Pt: Mariposa Nunez  MR: [id number]  : 1995  GRANT: 2022    Dear Dr. Villarreal    I had the pleasure of seeing Mariposa via a virtual visit (with the Pediatric Infectious Diseases Clinic at the [facility]).       Mariposa is a generally healthy 27 year old young woman who is seen at the Pediatric COVID Clinic for an out-patient consultation. I'm talking with both Mariposa and her Dad. Mariposa had COVID in the end of 2022 (likely exposed at home) and had few days of fever (up to 102) and ~2 weeks of fatigue, congestion, headache, and worsening of her reflux symptoms. She recovered and within ~3 weeks was back to her base line. Couple of weeks later she start developed chest pain and shortness of breathe. She was seen at that time at an urgent care and diagnosed with costochondralis, and was prescribed Aleve with improvement in symptoms. At that time she also developed difficulties concentrating (brain fog), which has since improved. In the past couple of weeks she has been experiencing recurrent episodes of palpitation that is associated with dizziness and chest pain. The palpitation can occur at any point, either when she is exercising, but also when she just sits on the couch. At least on one occasion she woke up at night with fast heart rate. She has a watch that monitor her vitals, and noticed that when her heart is racing the rate can be up to 160-170, but never above 200. These palpitation episodes start abruptly, last for few minutes and then slowly improve. Mariposa has been working out throughout this period, and although  at times her palpitation during exercise, she had multiple working out sessions that she could do without any issues, and even if she feels her heart racing, she can slow down for few minutes and then finish her sessions. Another bothersome symptom she is experiencing is frequent diarrhea following meals. She does not report associated significant abdominal pain or discomfort. She lost ~6lbs in the pst few weeks. She still has some fatigue and take more naps than usually, but this is improving. Mariposa was seen by few providers in the past few weeks and had some labs and testing done. EKG was done and was normal (did not have palpitation at the time of EKG), complete blood count and chemistries were also within normal limits. Of note, Mariposa's Mom has history of SVT at her late twenties, which spontaneously resolved. Otherwise she is doing well, without further specific concerns. She is in good spirits and feels that she has most of her energy back and report that her main concern is the occasional fast heart rate and chest pain.     I have reviewed and updated the patient's Past Medical History, Social History, Family History and Medication List.      Review of Systems: The Review of Systems is negative other than noted in the HPI  Past Medical History: I have reviewed this patient's past medical history  Social History: Lives in [place], currently visiting her parents in the [facility].  Immunization:   Immunization History   Administered Date(s) Administered     COVID-19,PF,Moderna 03/17/2021, 04/15/2021, 11/26/2021     DTAP (<7y) 08/08/2002     FLU 6-35 months 12/14/2015, 11/26/2016     HEPA 01/29/2010, 08/30/2010     HPV 01/04/2016     HepA-ped 2 Dose 09/08/2009, 01/29/2010, 08/30/2010     Hpv, Unspecified  01/04/2016     Influenza (H1N1) 01/29/2010     Influenza (IIV3) PF 02/15/2007, 10/20/2008, 11/30/2017     Influenza Intranasal Vaccine 10/19/2007     Influenza Intranasal Vaccine 4 valent (FluMist)  10/19/2007     Influenza Vaccine IM > 6 months Valent IIV4 (Alfuria,Fluzone) 01/17/2014, 11/17/2014, 10/15/2017, 10/07/2020     Influenza Vaccine, 6+MO IM (QUADRIVALENT W/PRESERVATIVES) 11/26/2016     MMR 08/08/2002     Meningococcal (Menactra ) 09/08/2009, 08/05/2015     Meningococcal (Menveo ) 08/05/2015     Poliovirus, inactivated (IPV) 08/08/2002     TDAP Vaccine (Adacel) 12/15/2008, 01/15/2018     Varicella 06/14/2007     Allergies:   Allergies   Allergen Reactions     Bee Venom      Bees      Severe swelling at sting site     Dust Mites      congeston     Hpv Vaccine Recombinant (Yeast Derived) [Quadrivalent Hpv, 6,11,16,18]      Sores in mouth and vagina     Other [No Clinical Screening - See Comments] Nausea and Itching     Bell peppers       Medications:   Current Outpatient Medications   Medication Sig     EPINEPHrine (EPIPEN/ADRENACLICK/OR ANY BX GENERIC EQUIV) 0.3 MG/0.3ML injection 2-pack Inject 0.3 mLs (0.3 mg) into the muscle as needed for anaphylaxis Patient will call when needs filled.     No current facility-administered medications for this visit.        Physical Exam:    I've seen and interacted with the patient directly through the video chat (according to developmental level): Yes.    Pertinent physical findings: Mariposa was awake, alert, and cooperative with me throughout our video exchange. No obvious physical abnormalities or distress were noticed.     Lab: See HPI for verbal report of normal labs.   Will order the following (to be done tomorrow 5/12): ECHO, EKG, troponin, CBC, CMP.      Assessment and plan: 26yo generally healthy young woman with recurrent palpitations and chest pain ~3m after COVID infection. This presentation is most likely due to lingering autonomic dysregulation following COVID infection. I doubt this is primary cardiac disease (such as myocarditis that may be caused by COVID, or SVT) but I recommend the following work-up to rule-out cardiac pathology: ECHO, EKG,  troponin. I also recommend to repeat CBC and CMP. These labs and tests are scheduled for tomorrow (). If any concerning results we will get a cardiology consultation, but otherwise if results are within normal limits, this will make Long Covid is the most likely diagnosis. I discussed this with Mariposa and her Dad and provided few suggestion for ongoing management of Long Covid.       Follow-up appointment was not scheduled.     Of course, if symptoms reoccur or any new issue arise I would be happy to see Mariposa again at clinic sooner.    Please contact me directly with any questions.    Thank you for allowing me to assist in Mariposa's care.     Video-Visit Details    Video Start Time: 3:10p  Video Stop Time: 3:40p    Total time spent, including coordination of care (at the day of the encounter): 30min    Originating Location (pt. Location): Home    Distant Location (provider location):  PEDS INFECTIOUS DISEASE     Mode of Communication:  Video Conference via [employer]      Sincerely,    Cb Ryan MD    Pediatric Infectious Diseases/Immunology  [facility]  [facility]  Clinic Coordinator (Yun Lawton): [phone]  Clinic Fax: [phone]  Clinic [phone]            CC  GISELLE LUIS    Copy to patient  HARINI ZAMUDIO DANIEL [address] [facility] [address]

## 2022-05-11 NOTE — TELEPHONE ENCOUNTER
Voicemail left regarding COVID clinic referral. Offered virtual appointment for today 5/11/22 at 3 pm with Dr. Ryan. Requested return call at [phone] to confirm if appointment time will work.       ..Yun Lawton RN on 5/11/2022 at 9:28 AM

## 2022-05-12 ENCOUNTER — HOSPITAL ENCOUNTER (OUTPATIENT)
Dept: CARDIOLOGY | Facility: CLINIC | Age: 27
Discharge: HOME | End: 2022-05-12
Attending: PEDIATRICS
Payer: COMMERCIAL

## 2022-05-12 ENCOUNTER — ALLIED HEALTH/NURSE VISIT (OUTPATIENT)
Dept: PEDIATRICS | Facility: CLINIC | Age: 27
End: 2022-05-12
Attending: PEDIATRICS
Payer: COMMERCIAL

## 2022-05-12 ENCOUNTER — LAB (OUTPATIENT)
Dept: LAB | Facility: CLINIC | Age: 27
End: 2022-05-12
Attending: PEDIATRICS
Payer: COMMERCIAL

## 2022-05-12 DIAGNOSIS — R07.9 CHEST PAIN, UNSPECIFIED TYPE: ICD-10-CM

## 2022-05-12 DIAGNOSIS — R00.2 PALPITATION: ICD-10-CM

## 2022-05-12 DIAGNOSIS — U07.1 2019 NOVEL CORONAVIRUS DISEASE (COVID-19): ICD-10-CM

## 2022-05-12 LAB
ATRIAL RATE - MUSE: 74 BPM
BASOPHILS # BLD AUTO: 0.1 10E3/UL (ref 0–0.2)
BASOPHILS NFR BLD AUTO: 1 %
DIASTOLIC BLOOD PRESSURE - MUSE: NORMAL MMHG
EOSINOPHIL # BLD AUTO: 0 10E3/UL (ref 0–0.7)
EOSINOPHIL NFR BLD AUTO: 0 %
ERYTHROCYTE [DISTWIDTH] IN BLOOD BY AUTOMATED COUNT: 12.8 % (ref 10–15)
HCT VFR BLD AUTO: 40.2 % (ref 35–47)
HGB BLD-MCNC: 13 G/DL (ref 11.7–15.7)
IMM GRANULOCYTES # BLD: 0 10E3/UL
IMM GRANULOCYTES NFR BLD: 0 %
INTERPRETATION ECG - MUSE: NORMAL
LYMPHOCYTES # BLD AUTO: 3 10E3/UL (ref 0.8–5.3)
LYMPHOCYTES NFR BLD AUTO: 32 %
MCH RBC QN AUTO: 27.4 PG (ref 26.5–33)
MCHC RBC AUTO-ENTMCNC: 32.3 G/DL (ref 31.5–36.5)
MCV RBC AUTO: 85 FL (ref 78–100)
MONOCYTES # BLD AUTO: 0.6 10E3/UL (ref 0–1.3)
MONOCYTES NFR BLD AUTO: 6 %
NEUTROPHILS # BLD AUTO: 5.7 10E3/UL (ref 1.6–8.3)
NEUTROPHILS NFR BLD AUTO: 61 %
NRBC # BLD AUTO: 0 10E3/UL
NRBC BLD AUTO-RTO: 0 /100
P AXIS - MUSE: 68 DEGREES
PLATELET # BLD AUTO: 349 10E3/UL (ref 150–450)
PR INTERVAL - MUSE: 156 MS
QRS DURATION - MUSE: 82 MS
QT - MUSE: 366 MS
QTC - MUSE: 406 MS
R AXIS - MUSE: 81 DEGREES
RBC # BLD AUTO: 4.74 10E6/UL (ref 3.8–5.2)
SYSTOLIC BLOOD PRESSURE - MUSE: NORMAL MMHG
T AXIS - MUSE: 70 DEGREES
TROPONIN I SERPL HS-MCNC: <3 NG/L
VENTRICULAR RATE- MUSE: 74 BPM
WBC # BLD AUTO: 9.3 10E3/UL (ref 4–11)

## 2022-05-12 PROCEDURE — 85004 AUTOMATED DIFF WBC COUNT: CPT

## 2022-05-12 PROCEDURE — 93306 TTE W/DOPPLER COMPLETE: CPT

## 2022-05-12 PROCEDURE — 36415 COLL VENOUS BLD VENIPUNCTURE: CPT

## 2022-05-12 PROCEDURE — 93306 TTE W/DOPPLER COMPLETE: CPT | Mod: 26 | Performed by: PEDIATRICS

## 2022-05-12 PROCEDURE — 84484 ASSAY OF TROPONIN QUANT: CPT

## 2022-07-06 ENCOUNTER — DOCUMENTATION ONLY (OUTPATIENT)
Dept: LAB | Facility: CLINIC | Age: 27
End: 2022-07-06

## 2022-07-06 NOTE — PROGRESS NOTES
pls call her. Not clear who requested labs and what she needs. Has been seen in infectious disease/covid clinic. Need to forward to requesting provider.  Nadine Villarreal M.D.

## 2022-07-06 NOTE — PROGRESS NOTES
Called patient and left a message asking patient to call back.  Left out labfax number for her to have in case they want to fax the orders to the lab, otherwise can hand carry if these are outside orders.  Juli Poon RN

## 2022-07-12 NOTE — PROGRESS NOTES
LVM for pt to hand carry orders or have other physician fax the orders, if the orders are from another facility. If wanting Dr. Myers to place orders, pt is seeing Dr. Myers prior to lab appt.    Flaquita Rockwell on 7/12/2022 at 10:24 AM

## 2022-07-13 ENCOUNTER — OFFICE VISIT (OUTPATIENT)
Dept: PEDIATRICS | Facility: CLINIC | Age: 27
End: 2022-07-13
Payer: COMMERCIAL

## 2022-07-13 VITALS
HEIGHT: 64 IN | TEMPERATURE: 97.5 F | WEIGHT: 154.3 LBS | SYSTOLIC BLOOD PRESSURE: 110 MMHG | OXYGEN SATURATION: 99 % | HEART RATE: 87 BPM | BODY MASS INDEX: 26.34 KG/M2 | RESPIRATION RATE: 16 BRPM | DIASTOLIC BLOOD PRESSURE: 74 MMHG

## 2022-07-13 DIAGNOSIS — N94.2 VAGINISMUS: ICD-10-CM

## 2022-07-13 DIAGNOSIS — F32.81 PMDD (PREMENSTRUAL DYSPHORIC DISORDER): ICD-10-CM

## 2022-07-13 DIAGNOSIS — Z00.00 ROUTINE GENERAL MEDICAL EXAMINATION AT A HEALTH CARE FACILITY: Primary | ICD-10-CM

## 2022-07-13 PROCEDURE — 99395 PREV VISIT EST AGE 18-39: CPT | Performed by: INTERNAL MEDICINE

## 2022-07-13 RX ORDER — DROSPIRENONE AND ETHINYL ESTRADIOL 0.03MG-3MG
1 KIT ORAL DAILY
Qty: 84 TABLET | Refills: 4 | Status: SHIPPED | OUTPATIENT
Start: 2022-07-13 | End: 2023-03-10

## 2022-07-13 RX ORDER — CETIRIZINE HYDROCHLORIDE 10 MG/1
10 TABLET ORAL DAILY
COMMUNITY
Start: 2022-07-13 | End: 2022-12-08

## 2022-07-13 RX ORDER — FAMOTIDINE 20 MG/1
20 TABLET, FILM COATED ORAL DAILY
COMMUNITY
Start: 2022-07-13 | End: 2023-04-19 | Stop reason: DRUGHIGH

## 2022-07-13 SDOH — ECONOMIC STABILITY: TRANSPORTATION INSECURITY
IN THE PAST 12 MONTHS, HAS LACK OF TRANSPORTATION KEPT YOU FROM MEETINGS, WORK, OR FROM GETTING THINGS NEEDED FOR DAILY LIVING?: NO

## 2022-07-13 SDOH — ECONOMIC STABILITY: FOOD INSECURITY: WITHIN THE PAST 12 MONTHS, THE FOOD YOU BOUGHT JUST DIDN'T LAST AND YOU DIDN'T HAVE MONEY TO GET MORE.: NEVER TRUE

## 2022-07-13 SDOH — ECONOMIC STABILITY: INCOME INSECURITY: IN THE LAST 12 MONTHS, WAS THERE A TIME WHEN YOU WERE NOT ABLE TO PAY THE MORTGAGE OR RENT ON TIME?: NO

## 2022-07-13 SDOH — ECONOMIC STABILITY: TRANSPORTATION INSECURITY
IN THE PAST 12 MONTHS, HAS THE LACK OF TRANSPORTATION KEPT YOU FROM MEDICAL APPOINTMENTS OR FROM GETTING MEDICATIONS?: NO

## 2022-07-13 SDOH — HEALTH STABILITY: PHYSICAL HEALTH: ON AVERAGE, HOW MANY MINUTES DO YOU ENGAGE IN EXERCISE AT THIS LEVEL?: 40 MIN

## 2022-07-13 SDOH — HEALTH STABILITY: PHYSICAL HEALTH: ON AVERAGE, HOW MANY DAYS PER WEEK DO YOU ENGAGE IN MODERATE TO STRENUOUS EXERCISE (LIKE A BRISK WALK)?: 4 DAYS

## 2022-07-13 SDOH — ECONOMIC STABILITY: FOOD INSECURITY: WITHIN THE PAST 12 MONTHS, YOU WORRIED THAT YOUR FOOD WOULD RUN OUT BEFORE YOU GOT MONEY TO BUY MORE.: NEVER TRUE

## 2022-07-13 SDOH — ECONOMIC STABILITY: INCOME INSECURITY: HOW HARD IS IT FOR YOU TO PAY FOR THE VERY BASICS LIKE FOOD, HOUSING, MEDICAL CARE, AND HEATING?: NOT HARD AT ALL

## 2022-07-13 ASSESSMENT — ENCOUNTER SYMPTOMS
WEAKNESS: 0
NERVOUS/ANXIOUS: 0
FREQUENCY: 0
CONSTIPATION: 0
ARTHRALGIAS: 0
HEADACHES: 0
MYALGIAS: 0
DIARRHEA: 0
FEVER: 0
PALPITATIONS: 0
SORE THROAT: 0
ABDOMINAL PAIN: 0
HEMATURIA: 0
CHILLS: 0
HEMATOCHEZIA: 0
BREAST MASS: 0
DIZZINESS: 0
DYSURIA: 0
PARESTHESIAS: 0
HEARTBURN: 0
JOINT SWELLING: 0
NAUSEA: 0
EYE PAIN: 0
SHORTNESS OF BREATH: 0
COUGH: 0

## 2022-07-13 ASSESSMENT — SOCIAL DETERMINANTS OF HEALTH (SDOH)
ARE YOU MARRIED, WIDOWED, DIVORCED, SEPARATED, NEVER MARRIED, OR LIVING WITH A PARTNER?: LIVING WITH PARTNER
HOW OFTEN DO YOU ATTEND CHURCH OR RELIGIOUS SERVICES?: MORE THAN 4 TIMES PER YEAR
HOW OFTEN DO YOU GET TOGETHER WITH FRIENDS OR RELATIVES?: MORE THAN THREE TIMES A WEEK
IN A TYPICAL WEEK, HOW MANY TIMES DO YOU TALK ON THE PHONE WITH FAMILY, FRIENDS, OR NEIGHBORS?: MORE THAN THREE TIMES A WEEK
DO YOU BELONG TO ANY CLUBS OR ORGANIZATIONS SUCH AS CHURCH GROUPS UNIONS, FRATERNAL OR ATHLETIC GROUPS, OR SCHOOL GROUPS?: YES

## 2022-07-13 ASSESSMENT — LIFESTYLE VARIABLES
HOW OFTEN DO YOU HAVE A DRINK CONTAINING ALCOHOL: MONTHLY OR LESS
SKIP TO QUESTIONS 9-10: 1
HOW OFTEN DO YOU HAVE SIX OR MORE DRINKS ON ONE OCCASION: NEVER
AUDIT-C TOTAL SCORE: 1
HOW MANY STANDARD DRINKS CONTAINING ALCOHOL DO YOU HAVE ON A TYPICAL DAY: 1 OR 2

## 2022-07-13 NOTE — PROGRESS NOTES
SUBJECTIVE:   CC: Mariposa Nunez is an 27 year old woman who presents for preventive health visit.     Healthy Habits:     Getting at least 3 servings of Calcium per day:  Yes    Bi-annual eye exam:  NO    Dental care twice a year:  Yes    Sleep apnea or symptoms of sleep apnea:  None    Diet:  Regular (no restrictions)    Frequency of exercise:  2-3 days/week    Duration of exercise:  30-45 minutes    Taking medications regularly:  Yes    Medication side effects:  None    PHQ-2 Total Score: 0    Additional concerns today:  Yes    Chronic pelvic pain.  Was living in [place].   Dx w/ vaginismus. Was referred for pelvic PT. Not able to schedule since she moved here to [place].    Interested in restarting contraception. In the past had taken Arelis. Some minor side effects but feels she tolerated.  She was started on a different OCP earlier this year. Noted onset of HA which improved after stopping.    COVID infection earlier this year. Fatigue and HA since that time. Is gradually improving.     Today's PHQ-2 Score:   PHQ-2 ( 1999 Pfizer) 7/13/2022   Q1: Little interest or pleasure in doing things 0   Q2: Feeling down, depressed or hopeless 0   PHQ-2 Score 0   PHQ-2 Total Score (12-17 Years)- Positive if 3 or more points; Administer PHQ-A if positive -   Q1: Little interest or pleasure in doing things Not at all   Q2: Feeling down, depressed or hopeless Not at all   PHQ-2 Score 0       Abuse: Current or Past (Physical, Sexual or Emotional) - No  Do you feel safe in your environment? Yes        Social History     Tobacco Use     Smoking status: Never Smoker     Smokeless tobacco: Never Used   Substance Use Topics     Alcohol use: Yes     Comment: occa         Alcohol Use 7/13/2022   Prescreen: >3 drinks/day or >7 drinks/week? No   Prescreen: >3 drinks/day or >7 drinks/week? -       Reviewed orders with patient.  Reviewed health maintenance and updated orders accordingly - Yes      Breast Cancer Screening:  Any new  "diagnosis of family breast, ovarian, or bowel cancer? No      History of abnormal Pap smear: no, pap done in January  PAP / HPV 8/20/2018   PAP (Historical) NIL       Review of Systems   Constitutional: Negative for chills and fever.   HENT: Negative for congestion, ear pain, hearing loss and sore throat.    Eyes: Negative for pain and visual disturbance.   Respiratory: Negative for cough and shortness of breath.    Cardiovascular: Negative for chest pain, palpitations and peripheral edema.   Gastrointestinal: Negative for abdominal pain, constipation, diarrhea, heartburn, hematochezia and nausea.   Breasts:  Negative for tenderness, breast mass and discharge.   Genitourinary: Negative for dysuria, frequency, genital sores, hematuria, pelvic pain, urgency, vaginal bleeding and vaginal discharge.   Musculoskeletal: Negative for arthralgias, joint swelling and myalgias.   Skin: Negative for rash.   Neurological: Negative for dizziness, weakness, headaches and paresthesias.   Psychiatric/Behavioral: Negative for mood changes. The patient is not nervous/anxious.         OBJECTIVE:   /74 (BP Location: Right arm, Patient Position: Sitting, Cuff Size: Adult Regular)   Pulse 87   Temp 97.5  F (36.4  C) (Tympanic)   Resp 16   Ht 1.633 m (5' 4.3\")   Wt 70 kg (154 lb 4.8 oz)   LMP 07/10/2022 (Exact Date)   SpO2 99%   BMI 26.24 kg/m    Physical Exam  GENERAL: healthy, alert and no distress  EYES: PERRL, EOMI  HENT: ear canals and TM's normal  NECK: no adenopathy  RESP: lungs clear to auscultation - no rales, rhonchi or wheezes  CV: regular rate and rhythm, normal S1 S2, no murmur, no peripheral edema and peripheral pulses strong  ABDOMEN: soft, nontender, bowel sounds normal  MS: no gross musculoskeletal defects noted, no edema  SKIN: no suspicious lesions or rashes  NEURO: Normal strength and tone  PSYCH: mentation appears normal, affect normal/bright         ASSESSMENT/PLAN:       ICD-10-CM    1. Routine general " "medical examination at a health care facility  Z00.00 Lipid panel reflex to direct LDL Fasting   2. PMDD (premenstrual dysphoric disorder)  F32.81 drospirenone-ethinyl estradiol (HAKAN) 3-0.03 MG tablet   3. Vaginismus  N94.2 Physical Therapy Referral     She tolerated Arelis in the past. Trial of restarting.    PT for pelvic pain sx.     COUNSELING:  Reviewed preventive health counseling, as reflected in patient instructions    Estimated body mass index is 26.24 kg/m  as calculated from the following:    Height as of this encounter: 1.633 m (5' 4.3\").    Weight as of this encounter: 70 kg (154 lb 4.8 oz).        She reports that she has never smoked. She has never used smokeless tobacco.      Duong Myers MD  [facility]  "

## 2022-07-20 ENCOUNTER — THERAPY VISIT (OUTPATIENT)
Dept: PHYSICAL THERAPY | Facility: CLINIC | Age: 27
End: 2022-07-20
Payer: COMMERCIAL

## 2022-07-20 DIAGNOSIS — M99.05 PELVIC SOMATIC DYSFUNCTION: ICD-10-CM

## 2022-07-20 DIAGNOSIS — N94.2 VAGINISMUS: ICD-10-CM

## 2022-07-20 DIAGNOSIS — M62.838 MUSCLE SPASM: ICD-10-CM

## 2022-07-20 PROCEDURE — 97110 THERAPEUTIC EXERCISES: CPT | Mod: GP | Performed by: PHYSICAL THERAPIST

## 2022-07-20 PROCEDURE — 97161 PT EVAL LOW COMPLEX 20 MIN: CPT | Mod: GP | Performed by: PHYSICAL THERAPIST

## 2022-07-20 PROCEDURE — 97140 MANUAL THERAPY 1/> REGIONS: CPT | Mod: GP | Performed by: PHYSICAL THERAPIST

## 2022-07-20 PROCEDURE — 97535 SELF CARE MNGMENT TRAINING: CPT | Mod: GP | Performed by: PHYSICAL THERAPIST

## 2022-07-20 NOTE — PROGRESS NOTES
Physical Therapy Initial Evaluation  Subjective:  HPI  SUBJECTIVE:  Patient reports onset of symptoms of pelvic pain with use of tampon, intercourse, during pelvic exams.  This has been ongoing since 2012 when she tried use of tampon for first time.  Also notes when she became sexually active that it was painful.  She was seen by OBGYN in [place] and was diagnosed with vaginismus and referred to PT.  Was unable to start PT due to recent move to [place].  Pt denies pain with ADLs.  Denies urinary frequency or incomplete emptying. Pain only with insertion/penetration.  She reports she tries to have intercourse, it is painful and often times she has to stop.  Since onset symptoms have been getting better, worse or staying the same? same    Urination:  Do you leak on the way to the bathroom or with a strong urge to void? Yes  - can happen 2 times a month if she has a strong urge and will leak a little bit  Do you leak with cough,sneeze, jumping, running?No   Any other activities that cause leaking? NA  Do you have triggers that make you feel you can't wait to go to the bathroom? No.  Type of pad and number used per day? NA  When you leak what is the amount? Small drops    How long can you delay the need to urinate? As needed, reports she can wait 2-4 hours between voids, denies frequency.   How many times do you get up to urinate at night? 0  Can you stop the flow of urine when on the toilet? no  Is the volume of urine passed usually: medium. (8sec rule=  250ml with average bladder storing  400-600ml)    Do you strain to pass urine? No  Do you have a slow or hesitant urinary stream? No  Do you have difficulty initiating the urine stream? No  Do you feel you fully empty your bladder?  Yes    How many bladder infections have you had in last 12 months?0    Fluid intake(one glass is 8oz or one cup) 6 glasses/day, 1 cup of tea/caffinated glasses/day, 0 alcohol glasses/day.    Bowel habits:  Frequency of bowel movements?1-2 times  a day  Consistancy of stool? soft formed  Do you ignore the urge to defecate? No  Do you strain to pass stool? No    Pelvic Pain:  When do you have pelvic pain? Only with insertion (tampon, pap smear, qtip, intercourse)  Is initial penetration during intercourse painful? Yes  Is deeper penetration painful? Yes  Do you use lubricant? yes What kind? Water based  Given birth? No   Are you sexually active?Yes  Have you ever been worried for your physical safety? No  Any abdominal or pelvic surgeries? Appendectomy 2018  Are you having any regular exercise?yes - walking/running/lifting  Have you practiced the PF(kegel) exercises for 4 or more weeks?no    Marinoff Scale:Level 2  (Level 3: Abstinence from intercourse because of severe pain. Level 2: Painful intercourse which limites frequency of activity. Level 1: Painful intercourse not severe enough to prevent activity.)                                    Objective:  System         Lumbar/SI Evaluation  ROM:  AROM Lumbar: normal                                                Pelvic Dysfunction Evaluation:        Flexibility:    Tightness present at:Adductors; Piriformis and Gluteals    Abdominal Wall:  Abdominal wall pelvic: min decreased fascial mobility of lower abdomen.  Diastasis Recti:  Negative      Pelvic Clock Exam:    Ischiocavernosis pain:  +/-  Bulbocavernosis pain:  +/-  Transverse Perineal:  -  Levator ANI:  +  Perineal Body:  -      External Assessment:  External assessment pelvic: elevated perineal body.  minimal movement with contract/relax.  Glut and adductor compensation, tight/decreased fascial mobility external perineum, tight introitus.  Skin Condition:  Normal    Bearing Down/Coughing:  Normal      Muscle Contraction/Perineal Mobility:  No movement and substitution  Internal Assessment:  Internal assessment pelvic: unable to tolerate internal assessment, unable to insert digit due to pain.                        Hip Evaluation  Hip PROM:  Hip  PROM:  Left Hip:    Normal  Right Hip:  Normal                                Functional Testing:          Quad:    Single leg squat:   Left:    Mild loss of control  Right:   Mild loss of control    Bilateral leg squat:  Normal control                  General     ROS    Assessment/Plan:    Patient is a 27 year old female with pelvic complaints.    Patient has the following significant findings with corresponding treatment plan.              Diagnosis 1:  Pelvic floor dysfunction/vaginismusi  Pain -  manual therapy, self management, education and home program  Decreased ROM/flexibility - manual therapy, therapeutic exercise, therapeutic activity and home program  Impaired muscle performance - biofeedback, neuro re-education and home program  Decreased function - therapeutic activities and home program    Therapy Evaluation Codes:   1) History comprised of:   Personal factors that impact the plan of care:      Time since onset of symptoms.    Comorbidity factors that impact the plan of care are:      None.     Medications impacting care: None.  2) Examination of Body Systems comprised of:   Body structures and functions that impact the plan of care:      Pelvis.   Activity limitations that impact the plan of care are:      Pelvic Exam and Benedict.  3) Clinical presentation characteristics are:   Stable/Uncomplicated.  4) Decision-Making    Low complexity using standardized patient assessment instrument and/or measureable assessment of functional outcome.  Cumulative Therapy Evaluation is: Low complexity.    Previous and current functional limitations:  (See Goal Flow Sheet for this information)    Short term and Long term goals: (See Goal Flow Sheet for this information)     Communication ability:  Patient appears to be able to clearly communicate and understand verbal and written communication and follow directions correctly.  Treatment Explanation - The following has been discussed with the patient:   RX  ordered/plan of care  Anticipated outcomes  Possible risks and side effects  This patient would benefit from PT intervention to resume normal activities.   Rehab potential is good.    Frequency:  1 X week, once daily  Duration:  For 3 weeks tapering to 2 times a month x 2 months  Discharge Plan:  Achieve all LTG.  Independent in home treatment program.  Reach maximal therapeutic benefit.    Please refer to the daily flowsheet for treatment today, total treatment time and time spent performing 1:1 timed codes.

## 2022-07-25 NOTE — PROGRESS NOTES
Physical Therapy Initial Evaluation  Subjective:    Patient Health History                  Medical allergies: Capsicum.   Surgeries include:  Other (Appendectomy).        Current occupation is [employer].   Primary job tasks include:  Computer work and prolonged sitting.                                    Objective:  System    Physical Exam    General     ROS    Assessment/Plan:

## 2022-08-19 ENCOUNTER — THERAPY VISIT (OUTPATIENT)
Dept: PHYSICAL THERAPY | Facility: CLINIC | Age: 27
End: 2022-08-19
Payer: COMMERCIAL

## 2022-08-19 DIAGNOSIS — N94.2 VAGINISMUS: ICD-10-CM

## 2022-08-19 DIAGNOSIS — M99.05 PELVIC SOMATIC DYSFUNCTION: Primary | ICD-10-CM

## 2022-08-19 DIAGNOSIS — N94.10 DYSPAREUNIA IN FEMALE: ICD-10-CM

## 2022-08-19 PROCEDURE — 97112 NEUROMUSCULAR REEDUCATION: CPT | Mod: GP | Performed by: PHYSICAL THERAPIST

## 2022-08-19 PROCEDURE — 97140 MANUAL THERAPY 1/> REGIONS: CPT | Mod: GP | Performed by: PHYSICAL THERAPIST

## 2022-11-20 ENCOUNTER — HEALTH MAINTENANCE LETTER (OUTPATIENT)
Age: 27
End: 2022-11-20

## 2022-12-08 ENCOUNTER — OFFICE VISIT (OUTPATIENT)
Dept: PEDIATRICS | Facility: CLINIC | Age: 27
End: 2022-12-08
Payer: COMMERCIAL

## 2022-12-08 VITALS
BODY MASS INDEX: 27.14 KG/M2 | TEMPERATURE: 98.7 F | SYSTOLIC BLOOD PRESSURE: 118 MMHG | HEIGHT: 64 IN | HEART RATE: 90 BPM | DIASTOLIC BLOOD PRESSURE: 80 MMHG | WEIGHT: 159 LBS | OXYGEN SATURATION: 100 % | RESPIRATION RATE: 16 BRPM

## 2022-12-08 DIAGNOSIS — J01.11 ACUTE RECURRENT FRONTAL SINUSITIS: ICD-10-CM

## 2022-12-08 DIAGNOSIS — R21 RASH: Primary | ICD-10-CM

## 2022-12-08 PROCEDURE — 99214 OFFICE O/P EST MOD 30 MIN: CPT | Mod: GC | Performed by: STUDENT IN AN ORGANIZED HEALTH CARE EDUCATION/TRAINING PROGRAM

## 2022-12-08 RX ORDER — METRONIDAZOLE 7.5 MG/G
GEL TOPICAL 2 TIMES DAILY
Qty: 45 G | Refills: 1 | Status: SHIPPED | OUTPATIENT
Start: 2022-12-08 | End: 2023-03-10

## 2022-12-08 ASSESSMENT — ENCOUNTER SYMPTOMS
FREQUENCY: 0
HEMATOCHEZIA: 0
FEVER: 0
HEADACHES: 1
HEMATURIA: 0
ARTHRALGIAS: 0
PARESTHESIAS: 0
EYE PAIN: 1
DIZZINESS: 0
ABDOMINAL PAIN: 0
SHORTNESS OF BREATH: 0
CHILLS: 0
WEAKNESS: 0
NERVOUS/ANXIOUS: 0
CONSTIPATION: 0
COUGH: 0
JOINT SWELLING: 0
HEARTBURN: 0
SORE THROAT: 0
MYALGIAS: 0
DIARRHEA: 0
PALPITATIONS: 0
DYSURIA: 0
NAUSEA: 0
BREAST MASS: 0

## 2022-12-08 NOTE — PATIENT INSTRUCTIONS
Try Sudafed ([facility] the [facility])  -go to the pharmacist counter and show your license  -Also try flonase (fluticasone) nasal spray twice daily to help encourage drainage  -Ok to try a neti pot or other saline rinse to help as well.  -Let us know if this is not helping after a few days to a week.  
PAST MEDICAL HISTORY:  Alcohol abuse     Hypertension, unspecified type     Poor historian

## 2022-12-08 NOTE — PROGRESS NOTES
"  Assessment & Plan     Rash and skin lesion  Rash on face could be consistent with rosacea, though she seems a bit young for this.  Refer to Derm for further evaluation and management.  Can trial topical metronidazole in the meantime.  Also has small nevus on chest that she says just developed in the last few years and has changed in color.  She says it used to be much darker than it is now.  On exam lesion is small (1 mm) and not irregular.  Color a bit variegated with darker rim.  Attempted to attach photo to documentation but photo was lost during upload.  - Adult Dermatology Referral  - metroNIDAZOLE (METROGEL) 0.75 % external gel  Dispense: 45 g; Refill: 1    Acute recurrent frontal sinusitis  Most consistent with viral sinusitis, unclear why this is happened a few times however his self resolved during previous bouts.  Advised treatments focused on sinus drainage including Sudafed, Flonase, nasal saline rinses.  She will let us know if these do not help, if her symptoms get worse, or if she has a recurrence.  If this happens again may be worth pursuing further work-up potentially including CT sinus.  Low suspicion for bacterial infection given lack of fever/chills, lack of superior tooth pain, other risk factors.               BMI:   Estimated body mass index is 27.29 kg/m  as calculated from the following:    Height as of this encounter: 1.626 m (5' 4\").    Weight as of this encounter: 72.1 kg (159 lb).     No follow-ups on file.    Lino Preciado MD  [facility] SERGIO Monge is a 27 year old, presenting for the following health issues:  Sinus Problem    Sinus Problem   Associated symptoms include congestion. Pertinent negatives include no chills, no ear pain, no sore throat, no cough and no shortness of breath.   Healthy Habits:     Getting at least 3 servings of Calcium per day:  Yes    Bi-annual eye exam:  NO    Dental care twice a year:  Yes    Sleep apnea or symptoms of " sleep apnea:  None    Diet:  Regular (no restrictions)    Frequency of exercise:  2-3 days/week    Duration of exercise:  45-60 minutes    Taking medications regularly:  Yes    Medication side effects:  Not applicable    PHQ-2 Total Score: 0    Additional concerns today:  Yes     Recurrent sinus pressure and headaches.  This is the third bout in as many months.  Currently she feels a dull achy headache since Thursday.  This headache is felt in the occipital region, as well as the frontal and temporal regions of the head.  She also feels some pressure and tenderness over her frontal and maxillary sinuses.  Feels sinus congestion.  She notes that her ears feel internal pressure and popping from time to time.  No sore throat, but she does feel irritating postnasal drip.  No fevers, no chills, no night sweats, no unexpected weight loss.  Feels dull ache behind her eyes as part of the headache, but eyes do not otherwise feel affected.  No changes in hearing.    She is tried Mucinex which actually helped a bit over the past few days.  Has not tried any other specific treatments.    Skin issues  Is also noticed itchy redness on her face with some small pustules.  She has had this before and wonders if it may be rosacea.  Today's appointment was actually intended to be with a dermatologist but something got mixed up in scheduling.    Review of Systems   Constitutional: Negative for chills and fever.   HENT: Positive for congestion. Negative for ear pain, hearing loss and sore throat.    Eyes: Positive for pain. Negative for visual disturbance.   Respiratory: Negative for cough and shortness of breath.    Cardiovascular: Negative for chest pain, palpitations and peripheral edema.   Gastrointestinal: Negative for abdominal pain, constipation, diarrhea, heartburn, hematochezia and nausea.   Breasts:  Negative for tenderness, breast mass and discharge.   Genitourinary: Positive for vaginal bleeding. Negative for dysuria,  "frequency, genital sores, hematuria, pelvic pain, urgency and vaginal discharge.   Musculoskeletal: Negative for arthralgias, joint swelling and myalgias.   Skin: Negative for rash.   Neurological: Positive for headaches. Negative for dizziness, weakness and paresthesias.   Psychiatric/Behavioral: Negative for mood changes. The patient is not nervous/anxious.         Objective    /80 (BP Location: Right arm, Patient Position: Chair, Cuff Size: Adult Regular)   Pulse 90   Temp 98.7  F (37.1  C) (Tympanic)   Resp 16   Ht 1.626 m (5' 4\")   Wt 72.1 kg (159 lb)   LMP 12/06/2022   SpO2 100%   BMI 27.29 kg/m    Body mass index is 27.29 kg/m .  Physical Exam   GENERAL: healthy, alert and no distress  EYES: Eyes grossly normal to inspection, conjunctivae and sclerae normal  HENT: ear canals and TM's normal, no erythema or effusions, nose and mouth without ulcers or lesions, oropharynx clear without erythema  NECK: no adenopathy, no asymmetry, masses, or scars and thyroid normal to palpation  SKIN: Erythema of bilateral cheeks with scattered papules/pustules but sparing nasolabial folds, single 1 mm nevus with central pallor and dark ring on upper left chest, otherwise no suspicious lesions or rashes  NEURO: Normal strength and tone, mentation intact and speech normal  PSYCH: mentation appears normal, affect normal/bright              "

## 2023-01-24 ENCOUNTER — TRANSFERRED RECORDS (OUTPATIENT)
Dept: HEALTH INFORMATION MANAGEMENT | Facility: CLINIC | Age: 28
End: 2023-01-24

## 2023-01-27 ENCOUNTER — TRANSFERRED RECORDS (OUTPATIENT)
Dept: HEALTH INFORMATION MANAGEMENT | Facility: CLINIC | Age: 28
End: 2023-01-27

## 2023-03-10 ENCOUNTER — OFFICE VISIT (OUTPATIENT)
Dept: FAMILY MEDICINE | Facility: CLINIC | Age: 28
End: 2023-03-10
Payer: COMMERCIAL

## 2023-03-10 VITALS
OXYGEN SATURATION: 100 % | SYSTOLIC BLOOD PRESSURE: 122 MMHG | BODY MASS INDEX: 26.53 KG/M2 | WEIGHT: 155.4 LBS | HEART RATE: 102 BPM | RESPIRATION RATE: 16 BRPM | TEMPERATURE: 97.8 F | DIASTOLIC BLOOD PRESSURE: 70 MMHG | HEIGHT: 64 IN

## 2023-03-10 DIAGNOSIS — N89.8 VAGINAL ITCHING: Primary | ICD-10-CM

## 2023-03-10 DIAGNOSIS — N76.2 ACUTE VULVITIS: ICD-10-CM

## 2023-03-10 LAB
CLUE CELLS: ABNORMAL
TRICHOMONAS, WET PREP: ABNORMAL
WBC'S/HIGH POWER FIELD, WET PREP: ABNORMAL
YEAST, WET PREP: ABNORMAL

## 2023-03-10 PROCEDURE — 99213 OFFICE O/P EST LOW 20 MIN: CPT | Performed by: PHYSICIAN ASSISTANT

## 2023-03-10 PROCEDURE — 87210 SMEAR WET MOUNT SALINE/INK: CPT | Performed by: PHYSICIAN ASSISTANT

## 2023-03-10 RX ORDER — CEFUROXIME AXETIL 500 MG/1
1 TABLET ORAL
COMMUNITY
Start: 2023-02-22 | End: 2023-04-19 | Stop reason: ALTCHOICE

## 2023-03-10 RX ORDER — IVERMECTIN 10 MG/G
CREAM TOPICAL
COMMUNITY
Start: 2023-01-25

## 2023-03-10 RX ORDER — TRIAMCINOLONE ACETONIDE 1 MG/G
OINTMENT TOPICAL 3 TIMES DAILY
Qty: 30 G | Refills: 0 | Status: SHIPPED | OUTPATIENT
Start: 2023-03-10 | End: 2023-04-19

## 2023-03-10 RX ORDER — CALCIUM CARBONATE 500 MG/1
TABLET, CHEWABLE ORAL
COMMUNITY
Start: 2021-09-15

## 2023-03-10 RX ORDER — CETIRIZINE HYDROCHLORIDE 10 MG/1
10 TABLET ORAL
COMMUNITY

## 2023-03-10 ASSESSMENT — PAIN SCALES - GENERAL: PAINLEVEL: NO PAIN (0)

## 2023-03-10 NOTE — PROGRESS NOTES
"  Assessment & Plan       ICD-10-CM    1. Vaginal itching  N89.8 Wet prep - lab collect     Wet prep - lab collect      2. Acute vulvitis  N76.2 triamcinolone (KENALOG) 0.1 % external ointment          1,2) Wet prep negative. Due to erythema of the vulva will have her take Diflucan 150mg x1 dose tomorrow. TCS 2-3x per day x4-7 days. Follow up if symptoms fail to improve or worsen.      Ordering of each unique test  Prescription drug management         BMI:   Estimated body mass index is 26.53 kg/m  as calculated from the following:    Height as of this encounter: 1.63 m (5' 4.17\").    Weight as of this encounter: 70.5 kg (155 lb 6.4 oz).       Return in about 1 week (around 3/17/2023), or if symptoms worsen or fail to improve.    Julieth Perales PA-C  [facility] BHAVNA Monge is a 28 year old, presenting for the following health issues:  Derm Problem, Vaginal Problem, and Medication Reconciliation (Provider-Medications are missing and need to be added. cefuroxime (CEFTIN) 500 MG tablet)      History of Present Illness       Reason for visit:  Rash in genital area  Symptom onset:  1-2 weeks ago  Symptoms include:  Itch, burning, red dots, white dots, spreading on legs  Symptom intensity:  Moderate  Symptom progression:  Worsening  Had these symptoms before:  No  What makes it better:  Hydrocortisone cream helps the itching    She eats 2-3 servings of fruits and vegetables daily.She consumes 1 sweetened beverage(s) daily.She exercises with enough effort to increase her heart rate 20 to 29 minutes per day.  She exercises with enough effort to increase her heart rate 4 days per week.   She is taking medications regularly.       Vaginal Symptoms  Onset/Duration: 1-1.5 weeks  Description:  Vaginal Discharge: white clear   Itching (Pruritis): YES  Burning sensation:  YES  Odor: No  Accompanying Signs & Symptoms:  Urinary symptoms: No  Abdominal pain: No  Fever: No  History:   Sexually " "active: YES  New Partner: No  Possibility of Pregnancy:  No  Recent antibiotic use: YES- current  Previous vaginitis issues: YES- yeast infections after antibiotic courses   Precipitating or alleviating factors: None  Therapies tried and outcome: none    Has been on numerous abx in the last few months due to chronic sinusitis   Finishes Ceftin today      Review of Systems   Constitutional, and gu systems are negative, except as otherwise noted.      Objective    /70 (BP Location: Right arm, Patient Position: Sitting, Cuff Size: Adult Regular)   Pulse 102   Temp 97.8  F (36.6  C) (Tympanic)   Resp 16   Ht 1.63 m (5' 4.17\")   Wt 70.5 kg (155 lb 6.4 oz)   LMP 03/03/2023 (Exact Date)   SpO2 100%   Breastfeeding No   BMI 26.53 kg/m    Body mass index is 26.53 kg/m .  Physical Exam   GENERAL: healthy, alert and no distress   (female): normal female external genitalia, normal urethral meatus  and mild erythema of the vulva and groin folds  MS: no gross musculoskeletal defects noted, no edema  NEURO: Normal strength and tone, mentation intact and speech normal  PSYCH: mentation appears normal, affect normal/bright    Results for orders placed or performed in visit on 03/10/23 (from the past 24 hour(s))   Wet prep - lab collect    Specimen: Vagina; Swab   Result Value Ref Range    Trichomonas Absent Absent    Yeast Absent Absent    Clue Cells Absent Absent    WBCs/high power field 1+ (A) None                 "

## 2023-04-19 ENCOUNTER — OFFICE VISIT (OUTPATIENT)
Dept: PEDIATRICS | Facility: CLINIC | Age: 28
End: 2023-04-19
Payer: COMMERCIAL

## 2023-04-19 VITALS
HEIGHT: 64 IN | DIASTOLIC BLOOD PRESSURE: 82 MMHG | HEART RATE: 98 BPM | TEMPERATURE: 97.9 F | WEIGHT: 155 LBS | OXYGEN SATURATION: 100 % | RESPIRATION RATE: 20 BRPM | SYSTOLIC BLOOD PRESSURE: 122 MMHG | BODY MASS INDEX: 26.46 KG/M2

## 2023-04-19 DIAGNOSIS — N89.8 VAGINAL ITCHING: ICD-10-CM

## 2023-04-19 DIAGNOSIS — B37.31 CANDIDIASIS OF VAGINA: Primary | ICD-10-CM

## 2023-04-19 DIAGNOSIS — B37.31 CANDIDIASIS OF VAGINA: ICD-10-CM

## 2023-04-19 LAB
CLUE CELLS: ABNORMAL
TRICHOMONAS, WET PREP: ABNORMAL
WBC'S/HIGH POWER FIELD, WET PREP: ABNORMAL
YEAST, WET PREP: PRESENT

## 2023-04-19 PROCEDURE — 87210 SMEAR WET MOUNT SALINE/INK: CPT | Performed by: NURSE PRACTITIONER

## 2023-04-19 PROCEDURE — 99213 OFFICE O/P EST LOW 20 MIN: CPT | Performed by: NURSE PRACTITIONER

## 2023-04-19 RX ORDER — FAMOTIDINE 40 MG/1
1 TABLET, FILM COATED ORAL DAILY
COMMUNITY
Start: 2022-05-10

## 2023-04-19 RX ORDER — FLUCONAZOLE 150 MG/1
150 TABLET ORAL
Qty: 3 TABLET | Refills: 0 | Status: SHIPPED | OUTPATIENT
Start: 2023-04-19 | End: 2023-05-10

## 2023-04-19 RX ORDER — FLUCONAZOLE 150 MG/1
150 TABLET ORAL
Qty: 3 TABLET | Refills: 0 | OUTPATIENT
Start: 2023-04-19 | End: 2023-04-26

## 2023-04-19 RX ORDER — FLUOXETINE 10 MG/1
10 CAPSULE ORAL DAILY
COMMUNITY
Start: 2023-03-29 | End: 2023-06-09

## 2023-04-19 ASSESSMENT — PAIN SCALES - GENERAL: PAINLEVEL: NO PAIN (0)

## 2023-04-19 NOTE — PROGRESS NOTES
"  Assessment & Plan     Candidiasis of vagina  Treat for recurrent vaginitis.  Follow-up if symptoms do not improve as expected (can do Evisit).  - fluconazole (DIFLUCAN) 150 MG tablet; Take 1 tablet (150 mg) by mouth every 3 days for 3 doses    Vaginal itching  - Wet prep - lab collect; Future  - Wet prep - lab collect      There are no Patient Instructions on file for this visit.    Frieda Deutsch NP  [facility] SERGIO Monge is a 28 year old, presenting for the following health issues:  Vaginal Problem         View : No data to display.              History of Present Illness       Reason for visit:  Vaginal itching  Symptom onset:  1-2 weeks ago  Symptoms include:  Itching and discharge  Symptom intensity:  Moderate  Symptom progression:  Staying the same  Had these symptoms before:  No  What makes it worse:  No  What makes it better:  No    She eats 4 or more servings of fruits and vegetables daily.She consumes 1 sweetened beverage(s) daily.She exercises with enough effort to increase her heart rate 30 to 60 minutes per day.  She exercises with enough effort to increase her heart rate 4 days per week.   She is taking medications regularly.     Neg wet prep 3/10/23  Was given diflucan and triamcinolone for itching  Denies chance of STI  Denies any urinary symptoms  Had been on several abx for this for sinusitis, these symptoms have resolved    Review of Systems         Objective    /82 (BP Location: Right arm, Patient Position: Chair, Cuff Size: Adult Regular)   Pulse 98   Temp 97.9  F (36.6  C) (Oral)   Resp 20   Ht 1.63 m (5' 4.17\")   Wt 70.3 kg (155 lb)   LMP 04/01/2023 (Exact Date)   SpO2 100%   BMI 26.46 kg/m    Body mass index is 26.46 kg/m .  Physical Exam   GENERAL: healthy, alert and no distress  PSYCH: mentation appears normal, affect normal/bright    Results for orders placed or performed in visit on 04/19/23 (from the past 24 hour(s))   Wet prep - lab " collect    Specimen: Vagina; Swab   Result Value Ref Range    Trichomonas Absent Absent    Yeast Present (A) Absent    Clue Cells Absent Absent    WBCs/high power field 1+ (A) None

## 2023-05-10 ENCOUNTER — LAB (OUTPATIENT)
Dept: LAB | Facility: CLINIC | Age: 28
End: 2023-05-10
Payer: COMMERCIAL

## 2023-05-10 ENCOUNTER — E-VISIT (OUTPATIENT)
Dept: PEDIATRICS | Facility: CLINIC | Age: 28
End: 2023-05-10

## 2023-05-10 DIAGNOSIS — B37.31 CANDIDAL VAGINITIS: ICD-10-CM

## 2023-05-10 DIAGNOSIS — B96.89 BACTERIAL VAGINOSIS: ICD-10-CM

## 2023-05-10 DIAGNOSIS — N76.0 BACTERIAL VAGINOSIS: ICD-10-CM

## 2023-05-10 DIAGNOSIS — R82.90 CLOUDY URINE: ICD-10-CM

## 2023-05-10 DIAGNOSIS — R82.90 CLOUDY URINE: Primary | ICD-10-CM

## 2023-05-10 LAB
ALBUMIN UR-MCNC: NEGATIVE MG/DL
APPEARANCE UR: CLEAR
BILIRUB UR QL STRIP: NEGATIVE
CLUE CELLS: PRESENT
COLOR UR AUTO: YELLOW
GLUCOSE UR STRIP-MCNC: NEGATIVE MG/DL
HGB UR QL STRIP: NEGATIVE
KETONES UR STRIP-MCNC: NEGATIVE MG/DL
LEUKOCYTE ESTERASE UR QL STRIP: NEGATIVE
NITRATE UR QL: NEGATIVE
PH UR STRIP: 6 [PH] (ref 5–7)
SP GR UR STRIP: <=1.005 (ref 1–1.03)
TRICHOMONAS, WET PREP: ABNORMAL
UROBILINOGEN UR STRIP-ACNC: 0.2 E.U./DL
WBC'S/HIGH POWER FIELD, WET PREP: ABNORMAL
YEAST, WET PREP: PRESENT

## 2023-05-10 PROCEDURE — 87210 SMEAR WET MOUNT SALINE/INK: CPT

## 2023-05-10 PROCEDURE — 81003 URINALYSIS AUTO W/O SCOPE: CPT

## 2023-05-10 PROCEDURE — 99421 OL DIG E/M SVC 5-10 MIN: CPT | Performed by: INTERNAL MEDICINE

## 2023-05-10 RX ORDER — FLUCONAZOLE 150 MG/1
150 TABLET ORAL
Qty: 3 TABLET | Refills: 0 | Status: SHIPPED | OUTPATIENT
Start: 2023-05-10 | End: 2023-05-30

## 2023-05-10 RX ORDER — METRONIDAZOLE 500 MG/1
500 TABLET ORAL 2 TIMES DAILY
Qty: 14 TABLET | Refills: 0 | Status: SHIPPED | OUTPATIENT
Start: 2023-05-10 | End: 2023-05-17

## 2023-06-09 ENCOUNTER — OFFICE VISIT (OUTPATIENT)
Dept: OBGYN | Facility: CLINIC | Age: 28
End: 2023-06-09
Payer: COMMERCIAL

## 2023-06-09 VITALS — WEIGHT: 157 LBS | BODY MASS INDEX: 26.81 KG/M2 | DIASTOLIC BLOOD PRESSURE: 80 MMHG | SYSTOLIC BLOOD PRESSURE: 130 MMHG

## 2023-06-09 DIAGNOSIS — B37.31 YEAST INFECTION INVOLVING THE VAGINA AND SURROUNDING AREA: Primary | ICD-10-CM

## 2023-06-09 PROCEDURE — 99213 OFFICE O/P EST LOW 20 MIN: CPT | Performed by: OBSTETRICS & GYNECOLOGY

## 2023-06-09 RX ORDER — FLUTICASONE PROPIONATE 50 MCG
SPRAY, SUSPENSION (ML) NASAL
COMMUNITY

## 2023-06-09 RX ORDER — FLUCONAZOLE 150 MG/1
150 TABLET ORAL
Qty: 30 TABLET | Refills: 0 | Status: SHIPPED | OUTPATIENT
Start: 2023-06-09 | End: 2023-06-16

## 2023-06-09 NOTE — PROGRESS NOTES
Chief Complaint   Patient presents with     Vaginal Problem       Subjective:  28-year-old nulliparous patient presents to discuss management options for recurrent/persistent vulvovaginal candidiasis.  Patient states that she has been on a multitude of antibiotic regimens, primarily due to sinus infections, which has resulted in recurrent vulvovaginal candidiasis.  She has been treated on several occasions with Diflucan and she does get clinical response.  Her symptoms have been recurring.    We discussed the nature of vulvovaginal candidiasis and that prolonged antibiotic therapy is definitely a risk factor.  We discussed vulvar hygiene.  The patient is nondiabetic.    We discussed both topical and systemic therapy and advised systemic therapy given the recalcitrant nature of her infections      REVIEW OF SYSTEMS:  General: as above    Health Maintenance   Topic Date Due     HEPATITIS B IMMUNIZATION (1 of 3 - 3-dose series) Never done     COVID-19 Vaccine (4 - Moderna series) 01/21/2022     YEARLY PREVENTIVE VISIT  07/13/2023     PAP  01/17/2025     ADVANCE CARE PLANNING  05/26/2026     DTAP/TDAP/TD IMMUNIZATION (4 - Td or Tdap) 01/15/2028     PHQ-2 (once per calendar year)  Completed     INFLUENZA VACCINE  Completed     MENINGITIS IMMUNIZATION  Completed     Pneumococcal Vaccine: Pediatrics (0 to 5 Years) and At-Risk Patients (6 to 64 Years)  Aged Out     IPV IMMUNIZATION  Aged Out     HEPATITIS C SCREENING  Discontinued     HIV SCREENING  Discontinued     HPV IMMUNIZATION  Discontinued       Allergies   Allergen Reactions     Bee Venom Swelling     Severe swelling at sting site     Amoxicillin Diarrhea, GI Disturbance and Nausea     Bees      Severe swelling at sting site     Capsaicin Itching     Cherry Hives     Dust Mites      [place]     Hpv Vaccine Recombinant (Yeast Derived) [Hpv 4-Valent Vaccine Recombinant Vaccine]      Sores in mouth and vagina     Ibuprofen-Diphenhydramine Cit Diarrhea and Nausea      Kiwi Hives     Mangifera Indica Hives     Other [No Clinical Screening - See Comments] Nausea and Itching     Bell peppers     Piper Hives       Objective:  Vitals: /80   Wt 71.2 kg (157 lb)   LMP 05/25/2023 (Exact Date)   BMI 26.81 kg/m    BMI= Body mass index is 26.81 kg/m .    External genitalia without lesions: Vaginal introitus appears normal.  Patient with vaginismus.  Swab at vaginal introitus performed.  pH 4.5.  Speculum exam deferred as I did not feel it would change my plan of care.  Assessment/Plan:  1. Yeast infection involving the vagina and surrounding area  Prolonged Diflucan therapy advised given recurrent and recalcitrant nature of vulvovaginal candidiasis infections.  - fluconazole (DIFLUCAN) 150 MG tablet; Take 1 tablet (150 mg) by mouth every 3 days for 3 doses Then take 1 dose weekly x 6 months for recurrent/persistent yeast  Dispense: 30 tablet; Refill: 0        Krzysztof Alonzo MD

## 2023-06-09 NOTE — NURSING NOTE
"Chief Complaint   Patient presents with     Vaginal Problem   c/o recent hx of multiple vaginal infections onset after lengthy antibiotic treatment for sinus infections.   Onset again since 6/4/23 with vaginal white/clear discharge and vaginal irritation.     initial /80   Wt 71.2 kg (157 lb)   LMP 05/25/2023 (Exact Date)   BMI 26.81 kg/m   Estimated body mass index is 26.81 kg/m  as calculated from the following:    Height as of 4/19/23: 1.63 m (5' 4.17\").    Weight as of this encounter: 71.2 kg (157 lb).  BP completed using cuff size regular.  Cori Schumacher CMA    "

## 2023-08-02 ENCOUNTER — OFFICE VISIT (OUTPATIENT)
Dept: PEDIATRICS | Facility: CLINIC | Age: 28
End: 2023-08-02
Payer: COMMERCIAL

## 2023-08-02 VITALS
BODY MASS INDEX: 27.74 KG/M2 | HEART RATE: 86 BPM | DIASTOLIC BLOOD PRESSURE: 72 MMHG | HEIGHT: 64 IN | SYSTOLIC BLOOD PRESSURE: 106 MMHG | RESPIRATION RATE: 18 BRPM | TEMPERATURE: 97.9 F | WEIGHT: 162.5 LBS | OXYGEN SATURATION: 99 %

## 2023-08-02 DIAGNOSIS — Z00.00 ROUTINE GENERAL MEDICAL EXAMINATION AT A HEALTH CARE FACILITY: Primary | ICD-10-CM

## 2023-08-02 LAB
CHOLEST SERPL-MCNC: 198 MG/DL
HBA1C MFR BLD: 5 % (ref 0–5.6)
HDLC SERPL-MCNC: 66 MG/DL
LDLC SERPL CALC-MCNC: 111 MG/DL
NONHDLC SERPL-MCNC: 132 MG/DL
TRIGL SERPL-MCNC: 103 MG/DL

## 2023-08-02 PROCEDURE — 80061 LIPID PANEL: CPT | Performed by: INTERNAL MEDICINE

## 2023-08-02 PROCEDURE — 36415 COLL VENOUS BLD VENIPUNCTURE: CPT | Performed by: INTERNAL MEDICINE

## 2023-08-02 PROCEDURE — 99395 PREV VISIT EST AGE 18-39: CPT | Performed by: INTERNAL MEDICINE

## 2023-08-02 PROCEDURE — 83036 HEMOGLOBIN GLYCOSYLATED A1C: CPT | Performed by: INTERNAL MEDICINE

## 2023-08-02 ASSESSMENT — ENCOUNTER SYMPTOMS
ARTHRALGIAS: 0
HEMATOCHEZIA: 0
DIARRHEA: 0
ABDOMINAL PAIN: 0
BREAST MASS: 0
PALPITATIONS: 0
COUGH: 0
SORE THROAT: 0
EYE PAIN: 0
CHILLS: 0
CONSTIPATION: 0
FEVER: 0
FREQUENCY: 0
DYSURIA: 0
PARESTHESIAS: 0
HEMATURIA: 0
HEADACHES: 1
HEARTBURN: 0
MYALGIAS: 0
DIZZINESS: 0
NERVOUS/ANXIOUS: 0
JOINT SWELLING: 0
NAUSEA: 0
WEAKNESS: 0
SHORTNESS OF BREATH: 0

## 2023-08-02 ASSESSMENT — PAIN SCALES - GENERAL: PAINLEVEL: NO PAIN (0)

## 2023-08-02 NOTE — PROGRESS NOTES
SUBJECTIVE:   CC: Mariposa is an 28 year old who presents for preventive health visit.       2023     2:15 PM   Additional Questions   Roomed by Danay         2023     2:15 PM   Patient Reported Additional Medications   Patient reports taking the following new medications none       Healthy Habits:     Getting at least 3 servings of Calcium per day:  Yes    Bi-annual eye exam:  Yes    Dental care twice a year:  Yes    Sleep apnea or symptoms of sleep apnea:  None    Diet:  Regular (no restrictions)    Frequency of exercise:  2-3 days/week    Duration of exercise:  30-45 minutes    Taking medications regularly:  Yes    Medication side effects:  None    Additional concerns today:  Yes    Here for CPE    Had chronic sinusitis issues from  to spring. Evaluated by ENT with CT in  w/ clear sinuses. Still feels fullness in the sinus areas.    Recurrent issues w/ yeast, likely d/t antibiotic use.  Had gyn eval just over 1 mo ago. Burst of fluconazole then weekly x 6 mo.  Has been taking a probiotic.    She notes an abnormal taste in her mouth and intermittent yellow tongue discoloration.     Anxiety hx. Treated by a different clinic.     Social History     Tobacco Use    Smoking status: Never    Smokeless tobacco: Never   Substance Use Topics    Alcohol use: Yes     Comment: barak         2023     2:16 PM   Alcohol Use   Prescreen: >3 drinks/day or >7 drinks/week? No       Reviewed orders with patient.  Reviewed health maintenance and updated orders accordingly - Yes      Breast Cancer Screenin/13/2022     9:54 AM   Breast CA Risk Assessment (FHS-7)   Do you have a family history of breast, colon, or ovarian cancer? No / Unknown           Pertinent mammograms are reviewed under the imaging tab.    History of abnormal Pap smear: NO - age 21-29 PAP every 3 years recommended      2018     8:28 AM   PAP / HPV   PAP (Historical) NIL          Review of Systems   Constitutional:  Negative  "for chills and fever.   HENT:  Negative for congestion, ear pain, hearing loss and sore throat.    Eyes:  Negative for pain and visual disturbance.   Respiratory:  Negative for cough and shortness of breath.    Cardiovascular:  Negative for chest pain, palpitations and peripheral edema.   Gastrointestinal:  Negative for abdominal pain, constipation, diarrhea, heartburn, hematochezia and nausea.   Breasts:  Negative for tenderness, breast mass and discharge.   Genitourinary:  Positive for vaginal discharge. Negative for dysuria, frequency, genital sores, hematuria, pelvic pain, urgency and vaginal bleeding.   Musculoskeletal:  Negative for arthralgias, joint swelling and myalgias.   Skin:  Negative for rash.   Neurological:  Positive for headaches. Negative for dizziness, weakness and paresthesias.   Psychiatric/Behavioral:  Negative for mood changes. The patient is not nervous/anxious.         OBJECTIVE:   /72 (BP Location: Right arm, Patient Position: Sitting, Cuff Size: Adult Regular)   Pulse 86   Temp 97.9  F (36.6  C) (Tympanic)   Resp 18   Ht 1.632 m (5' 4.25\")   Wt 73.7 kg (162 lb 8 oz)   LMP 07/21/2023 (Approximate)   SpO2 99%   BMI 27.68 kg/m    Physical Exam  GENERAL: healthy, alert and no distress  EYES: PERRL, EOMI  HENT: ear canals and TM's normal. No nasal discharge. OP moist. No oral or tongue lesions noted.   NECK: no adenopathy  RESP: lungs clear to auscultation - no rales, rhonchi or wheezes  CV: regular rate and rhythm, normal S1 S2, no murmur, no peripheral edema and peripheral pulses strong  ABDOMEN: soft, nontender, bowel sounds normal  MS: no gross musculoskeletal defects noted  SKIN: no suspicious lesions or rashes  NEURO: Normal strength and tone  PSYCH: mentation appears normal, affect normal/bright      ASSESSMENT/PLAN:       ICD-10-CM    1. Routine general medical examination at a health care facility  Z00.00 Lipid panel reflex to direct LDL Non-fasting     Hemoglobin A1c     " "Lipid panel reflex to direct LDL Non-fasting     Hemoglobin A1c        Overall doing well.  Non-fasting labwork drawn today.  Clinically no evidence for thrush. Recommend complete fluconazole rx as per current dosing. If yeast sx return afterward plan ID consultation.       COUNSELING:  Reviewed preventive health counseling, as reflected in patient instructions      BMI:   Estimated body mass index is 27.68 kg/m  as calculated from the following:    Height as of this encounter: 1.632 m (5' 4.25\").    Weight as of this encounter: 73.7 kg (162 lb 8 oz).   Weight management plan: Discussed healthy diet and exercise guidelines      She reports that she has never smoked. She has never used smokeless tobacco.    Duong Myers MD  [facility]  "

## 2023-10-11 ENCOUNTER — TRANSFERRED RECORDS (OUTPATIENT)
Dept: HEALTH INFORMATION MANAGEMENT | Facility: CLINIC | Age: 28
End: 2023-10-11
Payer: COMMERCIAL

## 2023-11-28 ENCOUNTER — OFFICE VISIT (OUTPATIENT)
Dept: DERMATOLOGY | Facility: CLINIC | Age: 28
End: 2023-11-28
Attending: STUDENT IN AN ORGANIZED HEALTH CARE EDUCATION/TRAINING PROGRAM
Payer: COMMERCIAL

## 2023-11-28 DIAGNOSIS — L20.9 ATOPIC DERMATITIS, UNSPECIFIED TYPE: Primary | ICD-10-CM

## 2023-11-28 DIAGNOSIS — L81.4 LENTIGO: ICD-10-CM

## 2023-11-28 DIAGNOSIS — D48.5 NEOPLASM OF UNCERTAIN BEHAVIOR OF SKIN: ICD-10-CM

## 2023-11-28 DIAGNOSIS — R21 RASH: ICD-10-CM

## 2023-11-28 DIAGNOSIS — D22.9 MULTIPLE BENIGN NEVI: ICD-10-CM

## 2023-11-28 DIAGNOSIS — D18.01 CHERRY ANGIOMA: ICD-10-CM

## 2023-11-28 PROCEDURE — 11102 TANGNTL BX SKIN SINGLE LES: CPT | Performed by: PHYSICIAN ASSISTANT

## 2023-11-28 PROCEDURE — 88305 TISSUE EXAM BY PATHOLOGIST: CPT | Performed by: DERMATOLOGY

## 2023-11-28 PROCEDURE — 99203 OFFICE O/P NEW LOW 30 MIN: CPT | Mod: 25 | Performed by: PHYSICIAN ASSISTANT

## 2023-11-28 RX ORDER — TRIAMCINOLONE ACETONIDE 0.25 MG/G
OINTMENT TOPICAL
Qty: 80 G | Refills: 3 | Status: SHIPPED | OUTPATIENT
Start: 2023-11-28 | End: 2024-02-12

## 2023-11-28 ASSESSMENT — PAIN SCALES - GENERAL: PAINLEVEL: NO PAIN (0)

## 2023-11-28 NOTE — PATIENT INSTRUCTIONS
Wound Care Instructions     FOR SUPERFICIAL WOUNDS     [facility] [phone]    [facility] [phone]                       AFTER 24 HOURS YOU SHOULD REMOVE THE BANDAGE AND BEGIN DAILY DRESSING CHANGES AS FOLLOWS:     1) Remove Dressing.     2) Clean and dry the area with tap water using a Q-tip or sterile gauze pad.     3) Apply Vaseline, Aquaphor, Polysporin ointment or Bacitracin ointment over entire wound.  Do NOT use Neosporin ointment.     4) Cover the wound with a band-aid, or a sterile non-stick gauze pad and micropore paper tape      REPEAT THESE INSTRUCTIONS AT LEAST ONCE A DAY UNTIL THE WOUND HAS COMPLETELY HEALED.    It is an old wives tale that a wound heals better when it is exposed to air and allowed to dry out. The wound will heal faster with a better cosmetic result if it is kept moist with ointment and covered with a bandage.    **Do not let the wound dry out.**      Supplies Needed:      *Cotton tipped applicators (Q-tips)    *Polysporin Ointment or Bacitracin Ointment (NOT NEOSPORIN)    *Band-aids or non-stick gauze pads and micropore paper tape.      PATIENT INFORMATION:    During the healing process you will notice a number of changes. All wounds develop a small halo of redness surrounding the wound.  This means healing is occurring. Severe itching with extensive redness usually indicates sensitivity to the ointment or bandage tape used to dress the wound.  You should call our office if this develops.      Swelling  and/or discoloration around your surgical site is common, particularly when performed around the eye.    All wounds normally drain.  The larger the wound the more drainage there will be.  After 7-10 days, you will notice the wound beginning to shrink and new skin will begin to grow.  The wound is healed when you can see skin has formed over the entire area.  A healed wound has a healthy, shiny look to the surface and is red to dark pink in color  to normalize.  Wounds may take approximately 4-6 weeks to heal.  Larger wounds may take 6-8 weeks.  After the wound is healed you may discontinue dressing changes.    You may experience a sensation of tightness as your wound heals. This is normal and will gradually subside.    Your healed wound may be sensitive to temperature changes. This sensitivity improves with time, but if you re having a lot of discomfort, try to avoid temperature extremes.    Patients frequently experience itching after their wound appears to have healed because of the continue healing under the skin.  Plain Vaseline will help relieve the itching.        POSSIBLE COMPLICATIONS    BLEEDING:    Leave the bandage in place.  Use tightly rolled up gauze or a cloth to apply direct pressure over the bandage for 30  minutes.  Reapply pressure for an additional 30 minutes if necessary  Use additional gauze and tape to maintain pressure once the bleeding has stopped.

## 2023-11-28 NOTE — PROGRESS NOTES
Mariposa Nunez is a pleasant 28 year old year old female patient here today for spot on chest. She notes present for a while, recently has been losing color. She notes hands are very dry as well on lips. She uses a lot of aquaphor which does help some but she will get some cracking and bleeding on lips. Patient has no other skin complaints today.  Remainder of the HPI, Meds, PMH, Allergies, FH, and SH was reviewed in chart.    Pertinent Hx: no personal history of skin cancer   Past Medical History:   Diagnosis Date    ADHD (attention deficit hyperactivity disorder)     Anxiety     Dyspareunia, female        Past Surgical History:   Procedure Laterality Date    ENDOSCOPY      LAPAROSCOPIC APPENDECTOMY N/A 01/02/2018    Procedure: LAPAROSCOPIC APPENDECTOMY;  Laparoscopic appendectomy ;  Surgeon: Nura Hyman MD;  Location: [facility] OR        Family History   Problem Relation Age of Onset    Asthma Mother     Hypertension Mother     Hyperlipidemia Father     Asthma Sister     Congenital Anomalies Sister     Cerebrovascular Disease Paternal Grandmother     Lupus Maternal Aunt     Neurologic Disorder Other         grandmother, aunts and uncles    Hypertension Other         also grandparents and uncle    Lipids Other         grandparents             Outpatient Encounter Medications as of 11/28/2023   Medication Sig Dispense Refill    triamcinolone (KENALOG) 0.025 % external ointment Apply sparingly twice daily to lips and hand for two week then decrease to 2 times weekly. 80 g 3    calcium carbonate (TUMS) 500 MG chewable tablet daily for heartburn      cetirizine (ZYRTEC) 10 MG tablet Take 10 mg by mouth      famotidine (PEPCID) 40 MG tablet Take 1 tablet by mouth daily      FLUoxetine (PROZAC) 20 MG capsule Take 20 mg by mouth daily      fluticasone (FLONASE) 50 MCG/ACT nasal spray       SOOLANTRA 1 % cream APPLY TOPICALLY TO FACE IN THE MORNING FOR ROSACEA       No facility-administered encounter medications on  file as of 11/28/2023.             O:   NAD, WDWN, Alert & Oriented, Mood & Affect wnl, Vitals stable   Here today alone   There were no vitals taken for this visit.   General appearance normal   Vitals stable   Alert, oriented and in no acute distress    brown macules on trunk and ext   Red papules on trunk  Brown papules and macules with regular pigment network and borders on torso and extremities   0.2 cm brown macule on left upper chest    Xerosis on hands and lower lip   The remainder of skin exam is normal       Eyes: Conjunctivae/lids:Normal     ENT: Lips: normal    MSK:Normal    Pulm: Breathing Normal    Neuro/Psych: Orientation:Alert and Orientedx3 ; Mood/Affect:normal   A/P:  1. R/O atypical nevus on left upper chest   TANGENTIAL BIOPSY SENT OUT:  After consent, anesthesia with LEC and prep, tangential excision performed and specimen sent out for permanent section histology.  No complications and routine wound care. Patient told to call our office in 1-2 weeks for result.      2. Atopic dermatitis  Continue aquaphor on lips, hands, use with gloves at bedtime.   Use triamcinolone ointment bid x 2 weeks then 2 times weekly thereafter.   3. Seborrheic keratosis, lentigo, angioma, benign nevi   It was a pleasure speaking to Mariposa Nunez today.  BENIGN LESIONS DISCUSSED WITH PATIENT:  I discussed the specifics of tumor, prognosis, and genetics of benign lesions.  I explained that treatment of these lesions would be purely cosmetic and not medically neccessary.  I discussed with patient different removal options including excision, cautery and /or laser.      Nature and genetics of benign skin lesions dicussed with patient.  Signs and Symptoms of skin cancer discussed with patient.  ABCDEs of melanoma reviewed with patient.  Patient encouraged to perform monthly skin exams.  UV precautions reviewed with patient.  Risks of non-melanoma skin cancer discussed with patient   Return to clinic pending biopsy  results.

## 2023-11-28 NOTE — NURSING NOTE
Chief Complaint   Patient presents with    Skin Check     Spot on chest        There were no vitals filed for this visit.  Wt Readings from Last 1 Encounters:   08/02/23 73.7 kg (162 lb 8 oz)       Nereida Boston LPN .................11/28/2023

## 2023-11-28 NOTE — LETTER
11/28/2023         RE: Mariposa Nunez  [birth year][address]  [place] [address]        Dear Colleague,    Thank you for referring your patient, Mariposa Nunez, to the [facility]. Please see a copy of my visit note below.    Mariposa Nunez is a pleasant 28 year old year old female patient here today for spot on chest. She notes present for a while, recently has been losing color. She notes hands are very dry as well on lips. She uses a lot of aquaphor which does help some but she will get some cracking and bleeding on lips. Patient has no other skin complaints today.  Remainder of the HPI, Meds, PMH, Allergies, FH, and SH was reviewed in chart.    Pertinent Hx: no personal history of skin cancer   Past Medical History:   Diagnosis Date     ADHD (attention deficit hyperactivity disorder)      Anxiety      Dyspareunia, female        Past Surgical History:   Procedure Laterality Date     ENDOSCOPY       LAPAROSCOPIC APPENDECTOMY N/A 01/02/2018    Procedure: LAPAROSCOPIC APPENDECTOMY;  Laparoscopic appendectomy ;  Surgeon: Nura Hyman MD;  Location:  OR        Family History   Problem Relation Age of Onset     Asthma Mother      Hypertension Mother      Hyperlipidemia Father      Asthma Sister      Congenital Anomalies Sister      Cerebrovascular Disease Paternal Grandmother      Lupus Maternal Aunt      Neurologic Disorder Other         grandmother, aunts and uncles     Hypertension Other         also grandparents and uncle     Lipids Other         grandparents             Outpatient Encounter Medications as of 11/28/2023   Medication Sig Dispense Refill     triamcinolone (KENALOG) 0.025 % external ointment Apply sparingly twice daily to lips and hand for two week then decrease to 2 times weekly. 80 g 3     calcium carbonate (TUMS) 500 MG chewable tablet daily for heartburn       cetirizine (ZYRTEC) 10 MG tablet Take 10 mg by mouth       famotidine (PEPCID) 40 MG  tablet Take 1 tablet by mouth daily       FLUoxetine (PROZAC) 20 MG capsule Take 20 mg by mouth daily       fluticasone (FLONASE) 50 MCG/ACT nasal spray        SOOLANTRA 1 % cream APPLY TOPICALLY TO FACE IN THE MORNING FOR ROSACEA       No facility-administered encounter medications on file as of 11/28/2023.             O:   NAD, WDWN, Alert & Oriented, Mood & Affect wnl, Vitals stable   Here today alone   There were no vitals taken for this visit.   General appearance normal   Vitals stable   Alert, oriented and in no acute distress    brown macules on trunk and ext   Red papules on trunk  Brown papules and macules with regular pigment network and borders on torso and extremities   0.2 cm brown macule on left upper chest    Xerosis on hands and lower lip   The remainder of skin exam is normal       Eyes: Conjunctivae/lids:Normal     ENT: Lips: normal    MSK:Normal    Pulm: Breathing Normal    Neuro/Psych: Orientation:Alert and Orientedx3 ; Mood/Affect:normal   A/P:  1. R/O atypical nevus on left upper chest   TANGENTIAL BIOPSY SENT OUT:  After consent, anesthesia with LEC and prep, tangential excision performed and specimen sent out for permanent section histology.  No complications and routine wound care. Patient told to call our office in 1-2 weeks for result.      2. Atopic dermatitis  Continue aquaphor on lips, hands, use with gloves at bedtime.   Use triamcinolone ointment bid x 2 weeks then 2 times weekly thereafter.   3. Seborrheic keratosis, lentigo, angioma, benign nevi   It was a pleasure speaking to Mariposa Nunez today.  BENIGN LESIONS DISCUSSED WITH PATIENT:  I discussed the specifics of tumor, prognosis, and genetics of benign lesions.  I explained that treatment of these lesions would be purely cosmetic and not medically neccessary.  I discussed with patient different removal options including excision, cautery and /or laser.      Nature and genetics of benign skin lesions dicussed with  patient.  Signs and Symptoms of skin cancer discussed with patient.  ABCDEs of melanoma reviewed with patient.  Patient encouraged to perform monthly skin exams.  UV precautions reviewed with patient.  Risks of non-melanoma skin cancer discussed with patient   Return to clinic pending biopsy results.       Again, thank you for allowing me to participate in the care of your patient.        Sincerely,        Gem Delgado PA-C

## 2023-12-01 LAB
PATH REPORT.COMMENTS IMP SPEC: NORMAL
PATH REPORT.COMMENTS IMP SPEC: NORMAL
PATH REPORT.FINAL DX SPEC: NORMAL
PATH REPORT.GROSS SPEC: NORMAL
PATH REPORT.MICROSCOPIC SPEC OTHER STN: NORMAL
PATH REPORT.RELEVANT HX SPEC: NORMAL

## 2024-02-12 ENCOUNTER — OFFICE VISIT (OUTPATIENT)
Dept: PEDIATRICS | Facility: CLINIC | Age: 29
End: 2024-02-12
Payer: COMMERCIAL

## 2024-02-12 VITALS
WEIGHT: 165 LBS | BODY MASS INDEX: 28.1 KG/M2 | SYSTOLIC BLOOD PRESSURE: 129 MMHG | TEMPERATURE: 97.7 F | HEART RATE: 89 BPM | DIASTOLIC BLOOD PRESSURE: 85 MMHG | OXYGEN SATURATION: 96 %

## 2024-02-12 DIAGNOSIS — N76.0 VAGINITIS AND VULVOVAGINITIS: Primary | ICD-10-CM

## 2024-02-12 LAB
ALBUMIN UR-MCNC: NEGATIVE MG/DL
APPEARANCE UR: CLEAR
BILIRUB UR QL STRIP: NEGATIVE
CLUE CELLS: ABNORMAL
COLOR UR AUTO: YELLOW
GLUCOSE UR STRIP-MCNC: NEGATIVE MG/DL
HGB UR QL STRIP: NEGATIVE
KETONES UR STRIP-MCNC: NEGATIVE MG/DL
LEUKOCYTE ESTERASE UR QL STRIP: NEGATIVE
NITRATE UR QL: NEGATIVE
PH UR STRIP: 7 [PH] (ref 5–7)
SP GR UR STRIP: 1.02 (ref 1–1.03)
TRICHOMONAS, WET PREP: ABNORMAL
UROBILINOGEN UR STRIP-ACNC: 0.2 E.U./DL
WBC'S/HIGH POWER FIELD, WET PREP: ABNORMAL
YEAST, WET PREP: ABNORMAL

## 2024-02-12 PROCEDURE — 81003 URINALYSIS AUTO W/O SCOPE: CPT | Performed by: INTERNAL MEDICINE

## 2024-02-12 PROCEDURE — 99213 OFFICE O/P EST LOW 20 MIN: CPT | Performed by: INTERNAL MEDICINE

## 2024-02-12 PROCEDURE — 87210 SMEAR WET MOUNT SALINE/INK: CPT | Performed by: INTERNAL MEDICINE

## 2024-02-12 RX ORDER — FLUCONAZOLE 150 MG/1
TABLET ORAL
COMMUNITY
Start: 2023-11-29 | End: 2024-02-12

## 2024-02-12 ASSESSMENT — PAIN SCALES - GENERAL: PAINLEVEL: NO PAIN (0)

## 2024-02-12 NOTE — PROGRESS NOTES
Assessment & Plan       ICD-10-CM    1. Vaginitis and vulvovaginitis  N76.0 UA Macroscopic with reflex to Microscopic and Culture - Clinic Collect     Wet prep - Clinic Collect        Hx of chronic / recurrent fungal vaginitis. Her wet prep and UA are both without any signs of ongoing yeast today. Since her symptoms are improving w/o treatment, no additional medications are needed.  I encouraged her to try to avoid antibiotic treatment whenever possible.    Duong Myers MD      [facility] is a 28 year old, presenting for the following health issues:  Vaginal Problem and Sinus Problem        2/12/2024     3:27 PM   Additional Questions   Roomed by Trinidad Vogel   Accompanied by N/A     History of Present Illness       Reason for visit:  Ongoing yeast infection    She eats 2-3 servings of fruits and vegetables daily.She consumes 0 sweetened beverage(s) daily.She exercises with enough effort to increase her heart rate 30 to 60 minutes per day.  She exercises with enough effort to increase her heart rate 4 days per week.   She is taking medications regularly.         Vaginal Symptoms  Onset/Duration: Winter of 2022  Description:  Vaginal Discharge: clear  Itching (Pruritis): YES  Burning sensation:  No  Odor: No  Accompanying Signs & Symptoms:  Urinary symptoms: No  Abdominal pain: No  Fever: No  History:   Sexually active: YES  New Partner: No  Possibility of Pregnancy:  No  Recent antibiotic use: Began after round of antibiotics in winter of 2022  Previous vaginitis issues: YES  Precipitating or alleviating factors: fluconazole (DIFLUCAN) - symptoms have mostly resolved  Therapies tried and outcome: Diflucan    Issues w/ recurrent / chronic fungal vulvovaginitis over the past few years.  Many are related to recurrent antibiotic use for dx of sinusitis.  She was evaluated by gyn last spring - treated for 6 months with fluconazole. This did improve her symptoms, but still w/ intermittent sx.  She scheduled this  visit about 2 months ago. Since that time, her vaginitis sx are improving. Now only minimal sx.   Has not taken atbx recently.  Had 2 bouts of sinus congestion she treated w/o atbx.         Objective    /85 (BP Location: Right arm, Patient Position: Sitting, Cuff Size: Adult Regular)   Pulse 89   Temp 97.7  F (36.5  C) (Tympanic)   Wt 74.8 kg (165 lb)   LMP 02/05/2024 (Exact Date)   SpO2 96%   BMI 28.10 kg/m    Body mass index is 28.1 kg/m .  Physical Exam   GEN: No distress      Results for orders placed or performed in visit on 02/12/24   UA Macroscopic with reflex to Microscopic and Culture - Clinic Collect     Status: Normal    Specimen: Urine, Clean Catch   Result Value Ref Range    Color Urine Yellow Colorless, Straw, Light Yellow, Yellow    Appearance Urine Clear Clear    Glucose Urine Negative Negative mg/dL    Bilirubin Urine Negative Negative    Ketones Urine Negative Negative mg/dL    Specific Gravity Urine 1.020 1.003 - 1.035    Blood Urine Negative Negative    pH Urine 7.0 5.0 - 7.0    Protein Albumin Urine Negative Negative mg/dL    Urobilinogen Urine 0.2 0.2, 1.0 E.U./dL    Nitrite Urine Negative Negative    Leukocyte Esterase Urine Negative Negative    Narrative    Microscopic not indicated   Wet prep - Clinic Collect     Status: Abnormal    Specimen: Vagina; Swab   Result Value Ref Range    Trichomonas Absent Absent    Yeast Absent Absent    Clue Cells Absent Absent    WBCs/high power field 1+ (A) None            Signed Electronically by: Duong Myers MD

## 2024-04-09 ENCOUNTER — MYC MEDICAL ADVICE (OUTPATIENT)
Dept: PEDIATRICS | Facility: CLINIC | Age: 29
End: 2024-04-09
Payer: COMMERCIAL

## 2024-05-07 ENCOUNTER — E-VISIT (OUTPATIENT)
Dept: PEDIATRICS | Facility: CLINIC | Age: 29
End: 2024-05-07
Payer: COMMERCIAL

## 2024-05-07 DIAGNOSIS — E66.3 OVERWEIGHT: Primary | ICD-10-CM

## 2024-05-07 PROCEDURE — 99421 OL DIG E/M SVC 5-10 MIN: CPT | Performed by: INTERNAL MEDICINE

## 2024-05-10 ENCOUNTER — LAB (OUTPATIENT)
Dept: LAB | Facility: CLINIC | Age: 29
End: 2024-05-10
Payer: COMMERCIAL

## 2024-05-10 DIAGNOSIS — E66.3 OVERWEIGHT: ICD-10-CM

## 2024-05-10 LAB — HBA1C MFR BLD: 5.2 % (ref 0–5.6)

## 2024-05-10 PROCEDURE — 80053 COMPREHEN METABOLIC PANEL: CPT

## 2024-05-10 PROCEDURE — 36415 COLL VENOUS BLD VENIPUNCTURE: CPT

## 2024-05-10 PROCEDURE — 84443 ASSAY THYROID STIM HORMONE: CPT

## 2024-05-10 PROCEDURE — 83036 HEMOGLOBIN GLYCOSYLATED A1C: CPT

## 2024-05-11 LAB
ALBUMIN SERPL BCG-MCNC: 4.2 G/DL (ref 3.5–5.2)
ALP SERPL-CCNC: 73 U/L (ref 40–150)
ALT SERPL W P-5'-P-CCNC: 13 U/L (ref 0–50)
ANION GAP SERPL CALCULATED.3IONS-SCNC: 8 MMOL/L (ref 7–15)
AST SERPL W P-5'-P-CCNC: 21 U/L (ref 0–45)
BILIRUB SERPL-MCNC: 0.3 MG/DL
BUN SERPL-MCNC: 12 MG/DL (ref 6–20)
CALCIUM SERPL-MCNC: 9.4 MG/DL (ref 8.6–10)
CHLORIDE SERPL-SCNC: 103 MMOL/L (ref 98–107)
CREAT SERPL-MCNC: 0.67 MG/DL (ref 0.51–0.95)
DEPRECATED HCO3 PLAS-SCNC: 27 MMOL/L (ref 22–29)
EGFRCR SERPLBLD CKD-EPI 2021: >90 ML/MIN/1.73M2
GLUCOSE SERPL-MCNC: 81 MG/DL (ref 70–99)
POTASSIUM SERPL-SCNC: 4.7 MMOL/L (ref 3.4–5.3)
PROT SERPL-MCNC: 7.8 G/DL (ref 6.4–8.3)
SODIUM SERPL-SCNC: 138 MMOL/L (ref 135–145)
TSH SERPL DL<=0.005 MIU/L-ACNC: 1.28 UIU/ML (ref 0.3–4.2)

## 2024-05-24 ENCOUNTER — E-VISIT (OUTPATIENT)
Dept: PEDIATRICS | Facility: CLINIC | Age: 29
End: 2024-05-24
Payer: COMMERCIAL

## 2024-05-24 DIAGNOSIS — R63.5 WEIGHT GAIN: Primary | ICD-10-CM

## 2024-05-24 PROCEDURE — 99421 OL DIG E/M SVC 5-10 MIN: CPT | Performed by: INTERNAL MEDICINE

## 2024-05-28 ENCOUNTER — LAB (OUTPATIENT)
Dept: LAB | Facility: CLINIC | Age: 29
End: 2024-05-28
Payer: COMMERCIAL

## 2024-05-28 DIAGNOSIS — R63.5 WEIGHT GAIN: ICD-10-CM

## 2024-05-28 LAB
FSH SERPL IRP2-ACNC: 6.8 MIU/ML
LH SERPL-ACNC: 6.8 MIU/ML

## 2024-05-28 PROCEDURE — 83002 ASSAY OF GONADOTROPIN (LH): CPT

## 2024-05-28 PROCEDURE — 84305 ASSAY OF SOMATOMEDIN: CPT

## 2024-05-28 PROCEDURE — 84403 ASSAY OF TOTAL TESTOSTERONE: CPT

## 2024-05-28 PROCEDURE — 83001 ASSAY OF GONADOTROPIN (FSH): CPT

## 2024-05-28 PROCEDURE — 36415 COLL VENOUS BLD VENIPUNCTURE: CPT

## 2024-05-30 LAB — TESTOST SERPL-MCNC: 20 NG/DL (ref 8–60)

## 2024-05-31 LAB — IGF-I BLD-MCNC: 140 NG/ML (ref 96–301)

## 2024-07-03 ENCOUNTER — PATIENT OUTREACH (OUTPATIENT)
Dept: CARE COORDINATION | Facility: CLINIC | Age: 29
End: 2024-07-03
Payer: COMMERCIAL

## 2024-07-17 ENCOUNTER — OFFICE VISIT (OUTPATIENT)
Dept: MIDWIFE SERVICES | Facility: CLINIC | Age: 29
End: 2024-07-17
Payer: COMMERCIAL

## 2024-07-17 VITALS
WEIGHT: 171.4 LBS | OXYGEN SATURATION: 100 % | SYSTOLIC BLOOD PRESSURE: 126 MMHG | BODY MASS INDEX: 29.19 KG/M2 | HEART RATE: 97 BPM | TEMPERATURE: 99.4 F | DIASTOLIC BLOOD PRESSURE: 81 MMHG

## 2024-07-17 DIAGNOSIS — Z13.0 SCREENING FOR IRON DEFICIENCY ANEMIA: ICD-10-CM

## 2024-07-17 DIAGNOSIS — Z01.419 ENCOUNTER FOR GYNECOLOGICAL EXAMINATION WITHOUT ABNORMAL FINDING: Primary | ICD-10-CM

## 2024-07-17 LAB
ERYTHROCYTE [DISTWIDTH] IN BLOOD BY AUTOMATED COUNT: 13.8 % (ref 10–15)
HCT VFR BLD AUTO: 37.3 % (ref 35–47)
HGB BLD-MCNC: 11.9 G/DL (ref 11.7–15.7)
MCH RBC QN AUTO: 26.9 PG (ref 26.5–33)
MCHC RBC AUTO-ENTMCNC: 31.9 G/DL (ref 31.5–36.5)
MCV RBC AUTO: 84 FL (ref 78–100)
PLATELET # BLD AUTO: 337 10E3/UL (ref 150–450)
RBC # BLD AUTO: 4.43 10E6/UL (ref 3.8–5.2)
WBC # BLD AUTO: 9.9 10E3/UL (ref 4–11)

## 2024-07-17 PROCEDURE — 85027 COMPLETE CBC AUTOMATED: CPT | Performed by: ADVANCED PRACTICE MIDWIFE

## 2024-07-17 PROCEDURE — 99385 PREV VISIT NEW AGE 18-39: CPT | Performed by: ADVANCED PRACTICE MIDWIFE

## 2024-07-17 PROCEDURE — 36415 COLL VENOUS BLD VENIPUNCTURE: CPT | Performed by: ADVANCED PRACTICE MIDWIFE

## 2024-07-17 RX ORDER — LACTOBACILLUS RHAMNOSUS GG 10B CELL
1 CAPSULE ORAL 2 TIMES DAILY
COMMUNITY

## 2024-07-17 ASSESSMENT — ANXIETY QUESTIONNAIRES
2. NOT BEING ABLE TO STOP OR CONTROL WORRYING: SEVERAL DAYS
6. BECOMING EASILY ANNOYED OR IRRITABLE: SEVERAL DAYS
1. FEELING NERVOUS, ANXIOUS, OR ON EDGE: SEVERAL DAYS
GAD7 TOTAL SCORE: 4
5. BEING SO RESTLESS THAT IT IS HARD TO SIT STILL: NOT AT ALL
3. WORRYING TOO MUCH ABOUT DIFFERENT THINGS: SEVERAL DAYS
IF YOU CHECKED OFF ANY PROBLEMS ON THIS QUESTIONNAIRE, HOW DIFFICULT HAVE THESE PROBLEMS MADE IT FOR YOU TO DO YOUR WORK, TAKE CARE OF THINGS AT HOME, OR GET ALONG WITH OTHER PEOPLE: NOT DIFFICULT AT ALL
7. FEELING AFRAID AS IF SOMETHING AWFUL MIGHT HAPPEN: NOT AT ALL
GAD7 TOTAL SCORE: 4

## 2024-07-17 ASSESSMENT — PATIENT HEALTH QUESTIONNAIRE - PHQ9
SUM OF ALL RESPONSES TO PHQ QUESTIONS 1-9: 0
5. POOR APPETITE OR OVEREATING: NOT AT ALL

## 2024-07-17 NOTE — PROGRESS NOTES
Mariposa is a 29 year old  female who presents for annual exam. No concerns feeling well. Thought pap smear was done in  and due this year but done in  and due next January. Normal pap smear history so due every 3 years. She has normal periods, no concerns. Condoms for BC. Declined STI testing, same partner x8 years. Okay for CBC testing. Recently labs done, TSH, CMP, A1C, and lipids normal last year. Has always had pelvic pain, vaginismus. Worked with pelvic floor and also did pilGenomeQuest which has worked the best for her. No other questions or concerns today.   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 Just got engaged last week! Planning a wedding most likely 10/2025. 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 Lives with tatiana.    Menses are regular q 28-30 days and normal lasting 2 days.  Menses flow: normal and medium with blood clots. LMP 2024. Using condoms for contraception.  She is not currently considering pregnancy.  Besides routine health maintenance, Pap, No STD/Cholesterol and Is Not Fasting today re:Labs, No Medication Refills/No Referrals.  GYNECOLOGIC HISTORY:  Menarche: 17  Age at first intercourse: 21 Number of lifetime partners: 1  Mariposa is sexually active with boyfriend/only/male partner(s) and is currently in monogamous relationship with boyfriend.    History sexually transmitted infections: No STD history  STI testing offered?  Declined  SHEBA exposure: No  History of abnormal Pap smear: No - age 21-29 PAP every 3 years recommended  Family history of breast CA: No  Family history of uterine/ovarian CA: No    Family history of colon CA: No    HEALTH MAINTENANCE:  Cholesterol: (  Cholesterol   Date Value Ref Range Status   2023 198 <200 mg/dL Final    History of abnormal lipids: No  Mammo: N/A . History of abnormal Mammo: YES, No, Not applicable.  Regular Self Breast Exams: Yes  Calcium/Vitamin  D intake: source:  dairy, dietary supplement(s) Adequate? Yes  TSH: (  TSH   Date Value Ref Range Status   05/10/2024 1.28 0.30 - 4.20 uIU/mL Final   05/15/2017 1.51 0.40 - 4.00 mU/L Final    )  Pap; (  Lab Results   Component Value Date    PAP NIL 2018    )    HISTORY:  OB History    Para Term  AB Living   0 0 0 0 0 0   SAB IAB Ectopic Multiple Live Births   0 0 0 0 0     Past Medical History:   Diagnosis Date    ADHD (attention deficit hyperactivity disorder)     Anxiety     Dyspareunia, female      Past Surgical History:   Procedure Laterality Date    ENDOSCOPY      LAPAROSCOPIC APPENDECTOMY N/A 2018    Procedure: LAPAROSCOPIC APPENDECTOMY;  Laparoscopic appendectomy ;  Surgeon: Nura Hyman MD;  Location: [facility] OR     Family History   Problem Relation Age of Onset    Asthma Mother     Hypertension Mother     Hyperlipidemia Father     Asthma Sister     Congenital heart disease Sister         potential VSD, hole in the heart, no surgery, premature    Hypertension Maternal Grandmother     Heart block Maternal Grandfather     Hypertension Paternal Grandmother     Cardiovascular Paternal Grandmother         stroke    Cerebrovascular Disease Paternal Grandfather     Coronary Artery Disease Paternal Grandfather     Heart Failure Paternal Grandfather     Lupus Maternal Aunt     Neurologic Disorder Other         grandmother, aunts and uncles    Hypertension Other         also grandparents and uncle    Lipids Other         grandparents     Social History     Socioeconomic History    Marital status: Single   Occupational History    Occupation: strategic communications, disability studies minor     Employer: STUDENT     Comment: [employer]   Tobacco Use    Smoking status: Never    Smokeless tobacco: Never   Vaping Use    Vaping status: Never Used   Substance and Sexual Activity    Alcohol use: Yes     Comment: occa    Drug use: No    Sexual activity: Yes     Partners: Male      Birth control/protection: Condom   Other Topics Concern    Parent/sibling w/ CABG, MI or angioplasty before 65F 55M? No   Social History Narrative    FAMILY INFORMATION     Date: 2006    Parent #1      Name: Patsy Nunez   Gender: female   : 1967      Education: [employer]   Occupation:         Parent #2      Name: Stalin Nunez   Gender: male   : 1962     Education: PhD [employer]   Occupation: surgeon        Siblings:  Name: Cher    : 1993        Relationship Status of Parent(s):     Who does the child live with? mother, father and sister(s)    What language(s) is/are spoken at home? English     Social Determinants of Health     Financial Resource Strain: Low Risk  (2022)    Overall Financial Resource Strain (CARDIA)     Difficulty of Paying Living Expenses: Not hard at all   Food Insecurity: No Food Insecurity (2022)    Hunger Vital Sign     Worried About Running Out of Food in the Last Year: Never true     Ran Out of Food in the Last Year: Never true   Transportation Needs: No Transportation Needs (2022)    PRAPARE - Transportation     Lack of Transportation (Medical): No     Lack of Transportation (Non-Medical): No   Physical Activity: Sufficiently Active (2022)    Exercise Vital Sign     Days of Exercise per Week: 4 days     Minutes of Exercise per Session: 40 min   Stress: No Stress Concern Present (2022)    [place] [employer] - Occupational Stress Questionnaire     Feeling of Stress : Not at all   Social Connections: Socially Integrated (2022)    Social Connection and Isolation Panel [NHANES]     Frequency of Communication with Friends and Family: More than three times a week     Frequency of Social Gatherings with Friends and Family: More than three times a week     Attends Scientologist Services: More than 4 times per year     Active Member of Clubs or Organizations: Yes     Marital Status: Living with  partner   Interpersonal Safety: Low Risk  (2/12/2024)    Interpersonal Safety     Do you feel physically and emotionally safe where you currently live?: Yes     Within the past 12 months, have you been hit, slapped, kicked or otherwise physically hurt by someone?: No     Within the past 12 months, have you been humiliated or emotionally abused in other ways by your partner or ex-partner?: No   Housing Stability: Low Risk  (7/13/2022)    Housing Stability Vital Sign     Unable to Pay for Housing in the Last Year: No     Number of Places Lived in the Last Year: 2     Unstable Housing in the Last Year: No       Current Outpatient Medications:     calcium carbonate (TUMS) 500 MG chewable tablet, daily for heartburn, Disp: , Rfl:     cetirizine (ZYRTEC) 10 MG tablet, Take 10 mg by mouth, Disp: , Rfl:     famotidine (PEPCID) 40 MG tablet, Take 1 tablet by mouth daily, Disp: , Rfl:     fluticasone (FLONASE) 50 MCG/ACT nasal spray, , Disp: , Rfl:     glutamine 500 MG capsule, Take 500 mg by mouth 4 times daily, Disp: , Rfl:     lactobacillus rhamnosus, GG, (CULTURELL) capsule, Take 1 capsule by mouth 2 times daily, Disp: , Rfl:     SOOLANTRA 1 % cream, APPLY TOPICALLY TO FACE IN THE MORNING FOR ROSACEA, Disp: , Rfl:      Allergies   Allergen Reactions    Bee Venom Swelling     Severe swelling at sting site    Amoxicillin Diarrhea, GI Disturbance and Nausea    Bees      Severe swelling at sting site    Capsaicin Itching    Cherry Hives    Dust Mites      [place]    Hpv Vaccine Recombinant (Yeast Derived) [Hpv 4-Valent Vaccine Recombinant Vaccine]      Sores in mouth and vagina    Ibuprofen-Diphenhydramine Cit Diarrhea and Nausea    Kiwi Hives    Mangifera Indica Hives    Other [No Clinical Screening - See Comments] Nausea and Itching     Bell peppers    Piper Hives       Past medical, surgical, social and family history were reviewed and updated in EPIC.    ROS:   C:     NEGATIVE for fever, chills, change in weight  I:        NEGATIVE for worrisome rashes, moles or lesions  E:     NEGATIVE for vision changes or irritation  E/M: NEGATIVE for ear, mouth and throat problems  R:     NEGATIVE for significant cough or SOB  CV:   NEGATIVE for chest pain, palpitations or peripheral edema  GI:     NEGATIVE for nausea, abdominal pain, heartburn, or change in bowel habits  :   NEGATIVE for frequency, dysuria, hematuria, vaginal discharge, or irregular bleeding  M:     NEGATIVE for significant arthralgias or myalgia  N:      NEGATIVE for weakness, dizziness or paresthesias  E:      NEGATIVE for temperature intolerance, skin/hair changes  P:      NEGATIVE for changes in mood or affect.    EXAM:  /81   Pulse 97   Temp 99.4  F (37.4  C) (Oral)   Wt 77.7 kg (171 lb 6.4 oz)   SpO2 100%   Breastfeeding No   BMI 29.19 kg/m     BMI: Body mass index is 29.19 kg/m .  Constitutional: healthy, alert and no distress  Head: Normocephalic. No masses, lesions, tenderness or abnormalities  Neck: Neck supple. Trachea midline. No adenopathy. Thyroid symmetric, normal size.   Cardiovascular: RRR.   Respiratory: Negative.   Breast: symmetrical and non tender, no masses, nipples everted, no discharge, SBE taught  Gastrointestinal: Abdomen soft, non-tender, non-distended. No masses, organomegaly.  : unable to complete due to discomfort   Musculoskeletal: extremities normal  Skin: no suspicious lesions or rashes  Psychiatric: Affect appropriate, cooperative,mentation appears normal.     COUNSELING:   Reviewed preventive health counseling, as reflected in patient instructions   reports that she has never smoked. She has never used smokeless tobacco.    Body mass index is 29.19 kg/m .    FRAX Risk Assessment    ASSESSMENT:  29 year old female with satisfactory annual exam  (Z01.419) Encounter for gynecological examination without abnormal finding  (primary encounter diagnosis)  Plan: Follow up for annual exams. Pap smear due next year.     (Z13.0) Screening  for iron deficiency anemia  Plan: CBC with platelets    MARIAM Andrade CNM

## 2024-08-05 ENCOUNTER — OFFICE VISIT (OUTPATIENT)
Dept: PEDIATRICS | Facility: CLINIC | Age: 29
End: 2024-08-05
Payer: COMMERCIAL

## 2024-08-05 VITALS
OXYGEN SATURATION: 99 % | DIASTOLIC BLOOD PRESSURE: 77 MMHG | WEIGHT: 174.1 LBS | RESPIRATION RATE: 18 BRPM | HEART RATE: 93 BPM | TEMPERATURE: 97.1 F | SYSTOLIC BLOOD PRESSURE: 113 MMHG | BODY MASS INDEX: 29.72 KG/M2 | HEIGHT: 64 IN

## 2024-08-05 DIAGNOSIS — Z00.00 ROUTINE GENERAL MEDICAL EXAMINATION AT A HEALTH CARE FACILITY: Primary | ICD-10-CM

## 2024-08-05 DIAGNOSIS — E66.811 CLASS 1 OBESITY WITHOUT SERIOUS COMORBIDITY WITH BODY MASS INDEX (BMI) OF 30.0 TO 30.9 IN ADULT, UNSPECIFIED OBESITY TYPE: ICD-10-CM

## 2024-08-05 PROCEDURE — 99395 PREV VISIT EST AGE 18-39: CPT | Performed by: INTERNAL MEDICINE

## 2024-08-05 PROCEDURE — 99213 OFFICE O/P EST LOW 20 MIN: CPT | Mod: 25 | Performed by: INTERNAL MEDICINE

## 2024-08-05 RX ORDER — METFORMIN HCL 500 MG
500 TABLET, EXTENDED RELEASE 24 HR ORAL DAILY
Qty: 30 TABLET | Refills: 5 | Status: SHIPPED | OUTPATIENT
Start: 2024-08-05 | End: 2024-09-13

## 2024-08-05 SDOH — HEALTH STABILITY: PHYSICAL HEALTH: ON AVERAGE, HOW MANY DAYS PER WEEK DO YOU ENGAGE IN MODERATE TO STRENUOUS EXERCISE (LIKE A BRISK WALK)?: 5 DAYS

## 2024-08-05 ASSESSMENT — SOCIAL DETERMINANTS OF HEALTH (SDOH): HOW OFTEN DO YOU GET TOGETHER WITH FRIENDS OR RELATIVES?: MORE THAN THREE TIMES A WEEK

## 2024-08-05 NOTE — PROGRESS NOTES
"Preventive Care Visit  [facility]  Duong Myers MD, Internal Medicine - Pediatrics  Aug 5, 2024      Assessment & Plan       ICD-10-CM    1. Routine general medical examination at a health care facility  Z00.00       2. Class 1 obesity without serious comorbidity with body mass index (BMI) of 30.0 to 30.9 in adult, unspecified obesity type  E66.9 metFORMIN (GLUCOPHAGE XR) 500 MG 24 hr tablet    Z68.30 Adult Comprehensive Weight Management  Referral        Overall she is doing well. HM reviewed and is UTD.     Obesity. Time spent discussing the dx as well as management options.  She is struggling to control her weight with lifestyle changes even after stopping selective serotonin reuptake inhibitor use.   We reviewed options.  OK to try Metformin.  Weight management referral signed.  She can d/w her psychiatrist about potentially trying Wellbutrin if a new med is needed for mood.     BMI  Estimated body mass index is 30.28 kg/m  as calculated from the following:    Height as of this encounter: 1.615 m (5' 3.58\").    Weight as of this encounter: 79 kg (174 lb 1.6 oz).   Weight management plan: Patient referred to endocrine and/or weight management specialty Discussed healthy diet and exercise guidelines start metformin.     Duong Myers MD     Usha Monge is a 29 year old, presenting for the following:  Physical (Concern with weight gain since discontinuing SSRI medications )        8/5/2024     3:22 PM   Additional Questions   Roomed by Zara PETERSON         8/5/2024     3:22 PM   Patient Reported Additional Medications   Patient reports taking the following new medications None        Health Care Directive  Patient does not have a Health Care Directive or Living Will: Discussed advance care planning with patient; however, patient declined at this time.    SUZETTE Monge is here for her CPE. Overall she is feeling well.     Struggling with weight control.  Had been treated with ssri in " the past which she felt could be contributing to weight gain.  Has hx of GH deficiency as a child; endocrine labwork was checked earlier this year w/o a cause identified.   She has been exercising up to 1 hour per day most days of the week, as well as monitoring and working to decrease calorie intake. Weight is still trending upward.   We reviewed options.     Wt Readings from Last 4 Encounters:   08/05/24 79 kg (174 lb 1.6 oz)   07/17/24 77.7 kg (171 lb 6.4 oz)   02/12/24 74.8 kg (165 lb)   08/02/23 73.7 kg (162 lb 8 oz)            8/5/2024   General Health   How would you rate your overall physical health? Good   Feel stress (tense, anxious, or unable to sleep) Only a little      (!) STRESS CONCERN      8/5/2024   Nutrition   Three or more servings of calcium each day? Yes   Diet: Carbohydrate counting   How many servings of fruit and vegetables per day? (!) 2-3   How many sweetened beverages each day? 0-1            8/5/2024   Exercise   Days per week of moderate/strenous exercise 5 days            8/5/2024   Social Factors   Frequency of gathering with friends or relatives More than three times a week   Worry food won't last until get money to buy more No   Food not last or not have enough money for food? No   Do you have housing? (Housing is defined as stable permanent housing and does not include staying ouside in a car, in a tent, in an abandoned building, in an overnight shelter, or couch-surfing.) Yes   Are you worried about losing your housing? No   Lack of transportation? No   Unable to get utilities (heat,electricity)? Yes   Want help with housing or utility concern? No      (!) FINANCIAL RESOURCE STRAIN CONCERN      8/5/2024   Dental   Dentist two times every year? Yes            8/5/2024   TB Screening   Were you born outside of the [place]? No              8/5/2024   Substance Use   Alcohol more than 3/day or more than 7/wk No   Do you use any other substances recreationally? No        Social History  "    Tobacco Use    Smoking status: Never     Passive exposure: Never    Smokeless tobacco: Never   Vaping Use    Vaping status: Never Used   Substance Use Topics    Alcohol use: Yes     Comment: occ, socially, few times per month    Drug use: No             8/5/2024   STI Screening   New sexual partner(s) since last STI/HIV test? No        History of abnormal Pap smear: No - age 21-29 PAP every 3 years recommended        1/17/2022     1:42 PM 8/20/2018     8:28 AM   PAP / HPV   PAP (Historical)  NIL    PAP-ABSTRACT See Scanned Document            This result is from an external source.           8/5/2024   Contraception/Family Planning   Questions about contraception or family planning No           Objective    Exam  /77 (BP Location: Right arm, Patient Position: Sitting, Cuff Size: Adult Large)   Pulse 93   Temp 97.1  F (36.2  C) (Temporal)   Resp 18   Ht 1.615 m (5' 3.58\")   Wt 79 kg (174 lb 1.6 oz)   LMP 07/23/2024   SpO2 99%   BMI 30.28 kg/m     Estimated body mass index is 30.28 kg/m  as calculated from the following:    Height as of this encounter: 1.615 m (5' 3.58\").    Weight as of this encounter: 79 kg (174 lb 1.6 oz).    Physical Exam  GENERAL: healthy, alert and no distress  EYES: PERRL, EOMI  HENT: ear canals and TM's normal. No nasal discharge. OP moist.  NECK: no adenopathy  RESP: lungs clear to auscultation - no rales, rhonchi or wheezes  CV: regular rate and rhythm, normal S1 S2, no murmur, no peripheral edema  ABDOMEN: soft, nontender, bowel sounds normal  MS: no gross musculoskeletal defects noted  SKIN: no suspicious lesions or rashes  NEURO: Normal strength and tone  PSYCH: mentation appears normal, affect normal         Signed Electronically by: Duong Myers MD    "

## 2024-09-06 ENCOUNTER — MYC MEDICAL ADVICE (OUTPATIENT)
Dept: PEDIATRICS | Facility: CLINIC | Age: 29
End: 2024-09-06
Payer: COMMERCIAL

## 2024-09-06 DIAGNOSIS — E66.811 CLASS 1 OBESITY WITHOUT SERIOUS COMORBIDITY WITH BODY MASS INDEX (BMI) OF 30.0 TO 30.9 IN ADULT, UNSPECIFIED OBESITY TYPE: ICD-10-CM

## 2024-09-09 NOTE — TELEPHONE ENCOUNTER
Called and spoke with patient to clarify specifics regarding medication and side effects. Patient states she has been taking metformin 500 mg as prescribed, 1 tablet daily with dinner. States she noticed no side effects for the first 3 weeks and then the fourth week she started experiencing increased appetite and noticed she felt like she needed to eat more than she was. States that the last 2 days it's gotten a little better. Also reports in the fourth week experiencing some constipation.     Reports eating consistent calories by tracking and eating around 65-90g of protein a day and 150-250g of carbs a day. States she tries to drink 80 ounces of water a day and lots of leafy and fiber. States her last menstrual period started 8/20/2024. Reports having stayed bout the same weight or having gained 1-2 pounds since starting the medication.    Please review and advise: patient inquiring if increase in appetite is a concern or normal and inquiring how long she should wait before seeing weight loss and if her 1-2 pound gain is normal?        metFORMIN (GLUCOPHAGE XR) 500 MG 24 hr tablet 30 tablet 5 8/5/2024 -- No   Sig - Route: Take 1 tablet (500 mg) by mouth daily - Oral     Carole Odell RN

## 2024-09-09 NOTE — TELEPHONE ENCOUNTER
Would not expect metformin to cause weight gain. Metformin is being used off label for weight loss by provider, typically dose is titrate to 2000 mg daily. Metformin would reach steady level within 4 days of starting the medication.     Defer to primary care provider if plan to further titrate metformin or trial alternate pharmacotherapy for weight loss.     Jessy Silva, PharmD  Medication Therapy Management Pharmacist

## 2024-09-10 NOTE — TELEPHONE ENCOUNTER
"Dr. Myers,    Please see pt concern regarding increased appetite and weight gain on metformin.     Per MTM:  \"Defer to primary care provider if plan to further titrate metformin or trial alternate pharmacotherapy for weight loss.\"    Please review and advise, thank you!  Jacinto Ty RN on 9/10/2024 at 8:43 AM    "

## 2024-09-13 RX ORDER — METFORMIN HCL 500 MG
1000 TABLET, EXTENDED RELEASE 24 HR ORAL DAILY
Qty: 60 TABLET | Refills: 5 | Status: SHIPPED | OUTPATIENT
Start: 2024-09-13 | End: 2024-09-17

## 2024-09-17 RX ORDER — METFORMIN HCL 500 MG
1000 TABLET, EXTENDED RELEASE 24 HR ORAL DAILY
Qty: 60 TABLET | Refills: 5 | Status: SHIPPED | OUTPATIENT
Start: 2024-09-17

## 2024-11-20 ENCOUNTER — OFFICE VISIT (OUTPATIENT)
Dept: ENDOCRINOLOGY | Facility: CLINIC | Age: 29
End: 2024-11-20
Payer: COMMERCIAL

## 2024-11-20 VITALS
WEIGHT: 172.2 LBS | SYSTOLIC BLOOD PRESSURE: 135 MMHG | HEART RATE: 86 BPM | OXYGEN SATURATION: 99 % | BODY MASS INDEX: 29.4 KG/M2 | DIASTOLIC BLOOD PRESSURE: 95 MMHG | HEIGHT: 64 IN

## 2024-11-20 DIAGNOSIS — E66.811 CLASS 1 OBESITY WITHOUT SERIOUS COMORBIDITY WITH BODY MASS INDEX (BMI) OF 30.0 TO 30.9 IN ADULT, UNSPECIFIED OBESITY TYPE: Primary | ICD-10-CM

## 2024-11-20 RX ORDER — METFORMIN HYDROCHLORIDE 500 MG/1
2000 TABLET, EXTENDED RELEASE ORAL DAILY
Qty: 120 TABLET | Refills: 3 | Status: SHIPPED | OUTPATIENT
Start: 2024-11-20 | End: 2024-11-21

## 2024-11-20 ASSESSMENT — SLEEP AND FATIGUE QUESTIONNAIRES
HOW LIKELY ARE YOU TO NOD OFF OR FALL ASLEEP WHILE WATCHING TV: SLIGHT CHANCE OF DOZING
HOW LIKELY ARE YOU TO NOD OFF OR FALL ASLEEP WHILE SITTING AND TALKING TO SOMEONE: WOULD NEVER DOZE
HOW LIKELY ARE YOU TO NOD OFF OR FALL ASLEEP WHILE SITTING INACTIVE IN A PUBLIC PLACE: WOULD NEVER DOZE
HOW LIKELY ARE YOU TO NOD OFF OR FALL ASLEEP WHILE SITTING QUIETLY AFTER LUNCH WITHOUT ALCOHOL: SLIGHT CHANCE OF DOZING
HOW LIKELY ARE YOU TO NOD OFF OR FALL ASLEEP IN A CAR, WHILE STOPPED FOR A FEW MINUTES IN TRAFFIC: WOULD NEVER DOZE
HOW LIKELY ARE YOU TO NOD OFF OR FALL ASLEEP WHILE LYING DOWN TO REST IN THE AFTERNOON WHEN CIRCUMSTANCES PERMIT: SLIGHT CHANCE OF DOZING
HOW LIKELY ARE YOU TO NOD OFF OR FALL ASLEEP WHEN YOU ARE A PASSENGER IN A CAR FOR AN HOUR WITHOUT A BREAK: WOULD NEVER DOZE
HOW LIKELY ARE YOU TO NOD OFF OR FALL ASLEEP WHILE SITTING AND READING: SLIGHT CHANCE OF DOZING

## 2024-11-20 ASSESSMENT — PAIN SCALES - GENERAL: PAINLEVEL_OUTOF10: NO PAIN (0)

## 2024-11-20 NOTE — PATIENT INSTRUCTIONS
"Tommy Monge, it was nice to meet you today!  Thank you for allowing us the privilege of caring for you. We hope we provided you with the excellent service you deserve.   Please let us know if there is anything else we can do for you so that we can be sure you are completely satisfied with your care experience.    To ensure the quality of our services you may be receiving a patient satisfaction survey from an independent patient satisfaction monitoring company.    The greatest compliment you can give is a \"Likely to Recommend\"    Your visit was with Chiqui Jauregui NP today.    Instructions per today's visit:     Follow up  plan:  Increase metformin   Try increasing protein to 80g daily   Try reducing calories to 1400 calories daily   Continue with orange theory   Keep up great work with hydration   See Dietitian in December   Follow up with me in 3 months   Can consider stimulant or bupropion   ___________________________________________________________________________  Important contact and scheduling information:  Please call our contact center at [phone] to schedule your next appointments.  For any nursing questions or concerns call Radha Shine LPN at [phone] or Eliz Garg RN at [phone]  Please call during clinic hours Monday through Friday 8:00a - 4:00p if you have questions or you can contact us via V[employer] at anytime and we will reply during clinic hours.    Lab results will be communicated through My Chart or letter (if My Chart not used). Please call the clinic if you have not received communication after 1 week or if you have any questions.?  Clinic Fax: [phone]  __________________________________________________________________________    If labs were ordered today:    Please make an appointment to have them drawn at your convenience.     To schedule the Lab Appointment using V[employer]:  Select \"Schedule an Appointment\"  Select \"Lab Only\"  For \"A couple of questions\", select \"Other\"  For " "\"Which locations work for you?, select the location and set up the appointment    To schedule by phone call [phone] to schedule a lab only appointment at any [facility] lab.  ___________________________________________________________________________  Work with [facility] !  Virtual Sessions are Available through [facility] Weight Management Clinics    To learn more, call to schedule a free, Health  Q&A appointment: [phone]     What is Health Coaching?  Do you know what you are supposed to do, but you just aren't doing it?  Then, HEALTH COACHING may help you!   Get unstuck and move forward with the support of a professionally trained [employer]-HWC (National Board-Certified Health and ) who uses evidence-based approaches to help you move forward with healthy lifestyle changes in the areas of weight loss, stress management and overall well-being.    Health Coaches help you identify goals that will work best for you. Health Coaches provide support and encouragement with overcoming barriers and help you to find inspiration and motivation to lead a healthy lifestyle.    Option one:  Health Coaching 3-Pack; Three, 30-minute Health Coaching Visits, for $99  Visits are done virtually (phone or video)  This is a self pay service; we do not accept insurance for mike coaching.    Option two:   The 24 week Plan; 11 Health Coaching Visits, and a 7 months subscription to makr-- on-demand fitness, nutrition and mindfulness classes, for $499 (employee discounts may be available). Participants will also meet regularly with a weight management Medical Provider and a Registered/Licensed Dietician.  This is a self-pay service; we do not accept insurance for health coaching.    To Schedule a free Health  Q&A appointment to learn more,  call [phone].  ____________________________________________________________________  [facility]" "[place]  Healthy Lifestyle Group    Healthy Lifestyle Group  This is a 60 minute virtual coaching group for those who want to lead a healthier lifestyle. Come together to set goals and overcome barriers in a supportive group environment. We will address the four pillars of health--nutrition, exercise, sleep and emotional well-being.  This group is highly recommended for those who are participating in the 24 week Healthy Lifestyle Plan and our Health Coaching sessions.    WHEN: This group meets the first Friday of the month, 12:30 PM - 1:30 PM online, via a zoom meeting.      FACILITATOR: Led by National Board Certified Health and , Julieth Ayers [facility].    TO REGISTER: Please call the Call Center at [phone] to register. You will get an appointment to attend in [facility]. Fifteen minutes prior to the meeting, complete the e-check in and you will get the link to join the meeting.  There is no charge to attend this group and space is limited.      2023 and 2024 Meeting Topics and Dates:    November 3: Introduction to Mindfulness (Learn simple and effective mindfulness practices and how it can benefit you)    December 8: Let's Talk (guided discussion on our wins and challenges)    January 5: New Years Vision: Manifest your Best 2024! (Guided imagery,  journaling and discussion)    February 2: Let's Talk    March 1: 10 Percent Happier by Haris Isidro (Book Bites; a guided discussion on the nuggets of wisdom from favorite wellness books; no need to read the book but highly encouraged)    April 5: Let's Talk    May 3: \"Essentialism; The Disciplined Pursuit of Less by Ronak Concepcion (book bites discussion)    June 7: Let's Talk    July 5: NO MEETING, off for the 4th of July Holiday    August 2: The Blue Zones, Secrets for Living a Longer Life by Haris Mills (book bites discussion)      If you would like bariatric surgery specific support group info please let your care team know.         Thank you,   [facility] " [facility] Team

## 2024-11-20 NOTE — PROGRESS NOTES
"60 minutes spent by me on the date of the encounter doing chart review, history and exam, documentation and further activities per the note    New Medical Weight Management Consult    PATIENT:  Mariposa Nunez  MRN:         [id number]  :         1995  GRANT:         2024    Dear Dr. Myers,    I had the pleasure of seeing your patient, Mariposa Nunez. Full intake/assessment was done to determine barriers to weight loss success and develop a treatment plan. Mariposa Nunez is a 29 year old female interested in treatment of medical problems associated with excess weight. She has a height of 5' 3.5\", a weight of 172 lbs 3.2 oz, and the calculated Body mass index is 30.03 kg/m .      Assessment & Plan   Problem List Items Addressed This Visit          Digestive    Class 1 obesity without serious comorbidity with body mass index (BMI) of 30.0 to 30.9 in adult, unspecified obesity type - Primary     Has always identified as carrying extra weight. Gradual gain over time but weight always felt manageable. Has been able to lose up to 15lb with diet and lifestyle changes but not changes had not been sustainable. Weight up and down 20lb since highschool. 25lb weight gain with starting prozac in  despite efforts to stop gain while taking. Ultimately, prozac was stopped but has not been able to lose the weight she had gained. She started metformin a few months ago without weight loss. She expresses desire to lose back to weight she was prior to taking prozac and has concerns about health consequences from weight gain. Comorbidities include heavy periods, reflux, ocd and anxiety.     She has been tracking her intake with [employer] pal this past 6 months; eating between 1600 and 1700 calories at least 5 days a week- usually getting close to 50-60g protein unless she isn't planning ahead. Has more hunger if she isn't getting higher protein diet and more difficult to meet her goals when not planning her meals. " "Avoiding restricting food groups. She goes to [employer] 3 days a week. On the weekend, she has more difficulty with tracking given she feels like tracking is all or nothing, difficult to stick to the plan.     Discussed 80/20 rule rather than all or nothing. Based on BMR on tanita scale of 1424 she may need to reduce caloric intake to achieve weight loss. With amount of exercise she is doing, she may need to increase protein to build muscle. Recommend follow up with dietitian to further discuss.    Could consider  antiobesity medications if hunger/ cravings become problematic. Tolerating metformin, less heavy periods while taking. No adverse side effects. Will try maximizing metformin.     Increase metformin   Try increasing protein to 80g daily   Try reducing calories to 1400 calories daily   Continue with [employer]   Keep up great work with hydration   See Dietitian in December   Follow up with me in 3 months   Can consider stimulant or bupropion          Relevant Medications    metFORMIN (GLUCOPHAGE XR) 500 MG 24 hr tablet    Other Relevant Orders    [facility] Follow-Up Order    Comprehensive metabolic panel            She has the following co-morbidities:        11/20/2024     6:44 AM   --   I have the following health issues associated with obesity GERD (Reflux)   I have the following symptoms associated with obesity None of the above     Heavy clots with cycle but she had a very regular period. With starting metformin period has been lighter, pms symptoms less dramatic and easier to tolerate     Cholesterol normal 8/2023 while taking prozac (OCD and anxiety)         11/20/2024     6:44 AM   Referring Provider   Please name the provider who referred you to Medical Weight Management  If you do not know, please answer \"I Don't Know\" Dr. Duong Myers           11/20/2024     6:44 AM   Weight History   How concerned are you about your weight? Very Concerned   I became " "overweight As an Adult   The following factors have contributed to my weight gain Started on Medication that Caused Weight Gain   I have tried the following methods to lose weight Watching Portions or Calories    Exercise    Medications    Fasting   My lowest weight since age 20 was 128   My highest weight since age 20 was 174   The most weight I have ever lost was (lbs) 15   I have the following family history of obesity/being overweight My mother is overweight    My father is overweight    Many of my relatives are overweight   How has your weight changed over the last year? Gained   How many pounds? 25     20lb weight gain when taking selective serotonin reuptake inhibitor- stopped medication but has not been able to take off the extra weight despite adding metformin     Has always struggled with body image and carried \"extra fat\" - 135lb in High school, college 145lb until getting sick with appendicitis and lost into 129lb. Regain as she was getting better. Whole 30 near end of college with loss to 129ish but not sustainable.     Gain to 150lb in covid and lost to mid 130s with calorie deficit and exercise. Tried this while she was taking prozac but didn't prevent gain. After out of her system she tried to do this again with no weight loss. Now, weight up and down 4lb despite all her efforts.     145lb when starting the ssri.       Wt Readings from Last 5 Encounters:   11/20/24 78.1 kg (172 lb 3.2 oz)   08/05/24 79 kg (174 lb 1.6 oz)   07/17/24 77.7 kg (171 lb 6.4 oz)   02/12/24 74.8 kg (165 lb)   08/02/23 73.7 kg (162 lb 8 oz)            11/20/2024     6:44 AM   Diet Recall Review with Patient   If you do eat breakfast, what types of food do you eat? protein bar or protein pancake or greek yogurt with toast with pb protein or protein bagel with cream cheese and veggies   If you do eat lunch, what types of food do you typically eat? snack plate with all the food groups   If you do eat supper, what types of food " do you typically eat? meat veggies and rice or chickpea pasta or potatoes   If you do snack, what types of food do you typically eat? fruit veggies crackers with protein   How many glasses of juice do you drink in a typical day? 0   How many of glasses of milk do you drink in a typical day? 0   How many 8oz glasses of sugar containing drinks such as Theodore-Aid/sweet tea do you drink in a day? 0   How many cans/bottles of sugar pop/soda/tea/sports drinks do you drink in a day? 0   How many cans/bottles of diet pop/soda/tea or sports drink do you drink in a day? 0   How often do you have a drink of alcohol? Monthly or Less   If you do drink, how many drinks might you have in a day? 1 or 2     Tracks with my fitness pal   If planning ahead she feels appetite is well controlled -sometimes feeling full before getting to her calorie goal   If not planning, she is hungry and has already consumed her calorie goals    Can identify less hungry if paying attention to protein - typically eating 50-60g daily     Tries to avoid restricting food groups         11/20/2024     6:44 AM   Eating Habits   Generally, my meals include foods like these bread, pasta, rice, potatoes, corn, crackers, sweet dessert, pop, or juice Almost Everyday   Generally, my meals include foods like these fried meats, brats, burgers, french fries, pizza, cheese, chips, or ice cream Once a Week   Eat fast food (like McDonal[employer], [employer], Taco Bell) Never   Eat at a buffet or sit-down restaurant Never   Eat most of my meals in front of the TV or computer Almost Everyday   Often skip meals, eat at random times, have no regular eating times Never   Rarely sit down for a meal but snack or graze throughout Less Than Weekly   Eat extra snacks between meals Once a Week   Eat most of my food at the end of the day Less Than Weekly   Eat in the middle of the night or wake up at night to eat Never   Eat extra snacks to prevent or correct low blood sugar Never   Eat to  prevent acid reflux or stomach pain Less Than Weekly   Worry about not having enough food to eat Never   I eat when I am depressed Never   I eat when I am stressed Once a Week   I eat when I am bored Once a Week   I eat when I am anxious Once a Week   I eat when I am happy or as a reward Never   I feel hungry all the time even if I just have eaten Less Than Weekly   Feeling full is important to me A Few Times a Week   I finish all the food on my plate even if I am already full Less Than Weekly   I can't resist eating delicious food or walk past the good food/smell Less Than Weekly   I eat/snack without noticing that I am eating Less Than Weekly   I eat when I am preparing the meal Never   I eat more than usual when I see others eating Less Than Weekly   I have trouble not eating sweets, ice cream, cookies, or chips if they are around the house Less Than Weekly   I think about food all day Once a Week   What foods, if any, do you crave? Sweets/Candy/Chocolate           11/20/2024     6:44 AM   Amount of Food   I feel out of control when eating Monthly   I eat a large amount of food, like a loaf of bread, a box of cookies, a pint/quart of ice cream, all at once Never   I eat a large amount of food even when I am not hungry Never   I eat rapidly Monthly   I eat alone because I feel embarrassed and do not want others to see how much I have eaten Never   I eat until I am uncomfortably full Never   I feel bad, disgusted, or guilty after I overeat Weekly           11/20/2024     6:44 AM   Activity/Exercise History   How much of a typical 12 hour day do you spend sitting? Half the Day   How much of a typical 12 hour day do you spend lying down? Less Than Half the Day   How much of a typical day do you spend walking/standing? Half the Day   How many hours (not including work) do you spend on the TV/Video Games/Computer/Tablet/Phone? 2-3 Hours   How many times a week are you active for the purpose of exercise? 4-5 TImes a  Week   What keeps you from being more active? Other   How many total minutes do you spend doing some activity for the purpose of exercising when you exercise? More Than 30 Minutes     [place] [employer] 3-4 days a week     PAST MEDICAL HISTORY:  Past Medical History:   Diagnosis Date    ADHD (attention deficit hyperactivity disorder)     Anxiety     Dyspareunia, female            11/20/2024     6:44 AM   Work/Social History Reviewed With Patient   My employment status is Laid-Off   My job    How much of your job is spent on the computer or phone? 100%   How many hours do you spend commuting to work daily? 0   What is your marital status? /In a Relationship   If in a relationship, is your significant other overweight? No   Who do you live with? Fiance   Who does the food shopping? Both of us           11/20/2024     6:44 AM   Mental Health History Reviewed With Patient   Have you ever been physically or sexually abused? No   How often in the past 2 weeks have you felt little interest or pleasure in doing things? Not at all   Over the past 2 weeks how often have you felt down, depressed, or hopeless? Not at all           11/20/2024     6:44 AM   Sleep History Reviewed With Patient   How many hours do you sleep at night? 7       MEDICATIONS:   Current Outpatient Medications   Medication Sig Dispense Refill    calcium carbonate (TUMS) 500 MG chewable tablet daily for heartburn      cetirizine (ZYRTEC) 10 MG tablet Take 10 mg by mouth      famotidine (PEPCID) 40 MG tablet Take 1 tablet by mouth daily      fluticasone (FLONASE) 50 MCG/ACT nasal spray       glutamine 500 MG capsule Take 500 mg by mouth 4 times daily      lactobacillus rhamnosus, GG, (CULTURELL) capsule Take 1 capsule by mouth 2 times daily      metFORMIN (GLUCOPHAGE XR) 500 MG 24 hr tablet Take 4 tablets (2,000 mg) by mouth daily. 120 tablet 3    SOOLANTRA 1 % cream APPLY TOPICALLY TO FACE IN THE MORNING FOR ROSACEA         ALLERGIES:    Allergies   Allergen Reactions    Bee Venom Swelling     Severe swelling at sting site    Amoxicillin Diarrhea, GI Disturbance and Nausea    Bees      Severe swelling at sting site    Capsaicin Itching    Cherry Hives    Dust Mites      [place]    Hpv Vaccine Recombinant (Yeast Derived) [Human Papillomavirus 4-Valent Recombinant Vaccine]      Sores in mouth and vagina    Ibuprofen-Diphenhydramine Cit Diarrhea and Nausea    Kiwi Hives    Mangifera Indica Hives    Other [No Clinical Screening - See Comments] Nausea and Itching     Bell peppers    Piper Hives       Office Visit on 07/17/2024   Component Date Value Ref Range Status    WBC Count 07/17/2024 9.9  4.0 - 11.0 10e3/uL Final    RBC Count 07/17/2024 4.43  3.80 - 5.20 10e6/uL Final    Hemoglobin 07/17/2024 11.9  11.7 - 15.7 g/dL Final    Hematocrit 07/17/2024 37.3  35.0 - 47.0 % Final    MCV 07/17/2024 84  78 - 100 fL Final    MCH 07/17/2024 26.9  26.5 - 33.0 pg Final    MCHC 07/17/2024 31.9  31.5 - 36.5 g/dL Final    RDW 07/17/2024 13.8  10.0 - 15.0 % Final    Platelet Count 07/17/2024 337  150 - 450 10e3/uL Final       Anti-obesity medication ROS:    HEENT  Hx of glaucoma: No    Cardiovascular  CAD:No  HTN:No    Gastrointestinal  GERD: famotidine daily, known hiatal hernia   Constipation/diarrhea/GI issues:No- some constipation just recently   Liver Dz:No  Computed FIB-4 Calculation unavailable. One or more values for this score either were not found within the given timeframe or did not fit some other criterion.    H/O Pancreatitis:No    Psychiatric  Bipolar: No  Anxiety:Yes  Depression:No  History of alcohol/drug abuse: No  Hx of eating disorder:No    Endocrine  Personal or family hx of MTC or MEN2:No  Diabetes/prediabetes: No    Neurologic:  Hx of seizures: No  Hx of migraines: No  Memory Impairment: No  Chronic pain/opioids use: No      History of kidney stones: No  Kidney disease: No  Current birth control:  not currently on birth control, doesn't  "plan to have children for 3 years, uses condoms currently        5/26/2021    10:09 AM 11/20/2024     5:56 AM   MADDY Score (Last Two)   MADDY Raw Score 40 40    Activation Score 100 100    MADDY Level 4 4        Patient-reported         PHYSICAL EXAM:  Objective    BP (!) 135/95 (BP Location: Left arm, Patient Position: Sitting, Cuff Size: Adult Regular)   Pulse 86   Ht 1.613 m (5' 3.5\")   Wt 78.1 kg (172 lb 3.2 oz)   SpO2 99%   BMI 30.03 kg/m    Physical Exam   GENERAL: alert and no distress  EYES: Eyes grossly normal to inspection.  No discharge or erythema, or obvious scleral/conjunctival abnormalities.  RESP: No audible wheeze, cough, or visible cyanosis.    SKIN: Visible skin clear. No significant rash, abnormal pigmentation or lesions.  NEURO: Cranial nerves grossly intact.  Mentation and speech appropriate for age.  PSYCH: Appropriate affect, tone, and pace of words     Computed FIB-4 Calculation unavailable. One or more values for this score either were not found within the given timeframe or did not fit some other criterion.    Fib-4 < 1.3: No further evaluation at this point, unless other concerns  - If the Fib-4 is > 2.67,  Fibroscan and elective liver clinic referral  - Intermediate Fib-4 scores: Get a Fibroscan, consider repeating this in 1-2 years.    Sincerely,    Chiqui Jauregui NP        "

## 2024-11-20 NOTE — NURSING NOTE
"(   Chief Complaint   Patient presents with    New Patient     New MWM    )    ( Weight: 78.1 kg (172 lb 3.2 oz) )  ( Height: 161.3 cm (5' 3.5\") )  ( BMI (Calculated): 30.03 )  (   )  (   )  (   )  (   )  (   )  (   )    ( BP: (!) 135/95 )  (   )  (   )  (   )  ( Pulse: 86 )  (   )  ( SpO2: 99 % )    (   Patient Active Problem List   Diagnosis    Anxiety state    History of bee sting allergy    ADHD (attention deficit hyperactivity disorder), inattentive type    Dyspareunia in female    Gastroesophageal reflux disease without esophagitis    Pelvic somatic dysfunction    Vaginismus    )  (   Current Outpatient Medications   Medication Sig Dispense Refill    calcium carbonate (TUMS) 500 MG chewable tablet daily for heartburn      cetirizine (ZYRTEC) 10 MG tablet Take 10 mg by mouth      famotidine (PEPCID) 40 MG tablet Take 1 tablet by mouth daily      fluticasone (FLONASE) 50 MCG/ACT nasal spray       glutamine 500 MG capsule Take 500 mg by mouth 4 times daily      lactobacillus rhamnosus, GG, (CULTURELL) capsule Take 1 capsule by mouth 2 times daily      metFORMIN (GLUCOPHAGE XR) 500 MG 24 hr tablet Take 2 tablets (1,000 mg) by mouth daily. 60 tablet 5    SOOLANTRA 1 % cream APPLY TOPICALLY TO FACE IN THE MORNING FOR ROSACEA      )  ( Diabetes Eval:    )    ( Pain Eval:  No Pain (0) )    ( Wound Eval:       )    (   History   Smoking Status    Never   Smokeless Tobacco    Never    )    ( Signed By:  Rosalia De Paz; November 20, 2024; 7:04 AM )    "

## 2024-11-20 NOTE — LETTER
"2024       RE: Mariposa Nunez   [facility] [id number]     Dear Colleague,    Thank you for referring your patient, Mariposa Nunez, to the [facility] [place] at [facility]. Please see a copy of my visit note below.    60 minutes spent by me on the date of the encounter doing chart review, history and exam, documentation and further activities per the note    New Medical Weight Management Consult    PATIENT:  Mariposa Nunez  MRN:         [id number]  :         1995  GRANT:         2024    Dear Dr. Myers,    I had the pleasure of seeing your patient, Mariposa Nunez. Full intake/assessment was done to determine barriers to weight loss success and develop a treatment plan. Mariposa Nunez is a 29 year old female interested in treatment of medical problems associated with excess weight. She has a height of 5' 3.5\", a weight of 172 lbs 3.2 oz, and the calculated Body mass index is 30.03 kg/m .      Assessment & Plan  Problem List Items Addressed This Visit          Digestive    Class 1 obesity without serious comorbidity with body mass index (BMI) of 30.0 to 30.9 in adult, unspecified obesity type - Primary     Has always identified as carrying extra weight. Gradual gain over time but weight always felt manageable. Has been able to lose up to 15lb with diet and lifestyle changes but not changes had not been sustainable. Weight up and down 20lb since [facility]. 25lb weight gain with starting prozac in  despite efforts to stop gain while taking. Ultimately, prozac was stopped but has not been able to lose the weight she had gained. She started metformin a few months ago without weight loss. She expresses desire to lose back to weight she was prior to taking prozac and has concerns about health consequences from weight gain. Comorbidities include heavy periods, reflux, ocd and anxiety. "     She has been tracking her intake with [employer] this past 6 months; eating between 1600 and 1700 calories at least 5 days a week- usually getting close to 50-60g protein unless she isn't planning ahead. Has more hunger if she isn't getting higher protein diet and more difficult to meet her goals when not planning her meals. Avoiding restricting food groups. She goes to [employer] 3 days a week. On the weekend, she has more difficulty with tracking given she feels like tracking is all or nothing, difficult to stick to the plan.     Discussed 80/20 rule rather than all or nothing. Based on BMR on tanita scale of 1424 she may need to reduce caloric intake to achieve weight loss. With amount of exercise she is doing, she may need to increase protein to build muscle. Recommend follow up with dietitian to further discuss.    Could consider  antiobesity medications if hunger/ cravings become problematic. Tolerating metformin, less heavy periods while taking. No adverse side effects. Will try maximizing metformin.     Increase metformin   Try increasing protein to 80g daily   Try reducing calories to 1400 calories daily   Continue with [employer] theory   Keep up great work with hydration   See Dietitian in December   Follow up with me in 3 months   Can consider stimulant or bupropion          Relevant Medications    metFORMIN (GLUCOPHAGE XR) 500 MG 24 hr tablet    Other Relevant Orders    Adult [facility]ariat[facility] Clinic Follow-Up Order    Comprehensive metabolic panel            She has the following co-morbidities:        11/20/2024     6:44 AM   --   I have the following health issues associated with obesity GERD (Reflux)   I have the following symptoms associated with obesity None of the above     Heavy clots with cycle but she had a very regular period. With starting metformin period has been lighter, pms symptoms less dramatic and easier to tolerate     Cholesterol normal 8/2023 while taking  "prozac (OCD and anxiety)         11/20/2024     6:44 AM   Referring Provider   Please name the provider who referred you to Medical Weight Management  If you do not know, please answer \"I Don't Know\" Dr. Duong Myers           11/20/2024     6:44 AM   Weight History   How concerned are you about your weight? Very Concerned   I became overweight As an Adult   The following factors have contributed to my weight gain Started on Medication that Caused Weight Gain   I have tried the following methods to lose weight Watching Portions or Calories    Exercise    Medications    Fasting   My lowest weight since age 20 was 128   My highest weight since age 20 was 174   The most weight I have ever lost was (lbs) 15   I have the following family history of obesity/being overweight My mother is overweight    My father is overweight    Many of my relatives are overweight   How has your weight changed over the last year? Gained   How many pounds? 25     20lb weight gain when taking selective serotonin reuptake inhibitor- stopped medication but has not been able to take off the extra weight despite adding metformin     Has always struggled with body image and carried \"extra fat\" - 135lb in High school, college 145lb until getting sick with appendicitis and lost into 129lb. Regain as she was getting better. Whole 30 near end of college with loss to 129ish but not sustainable.     Gain to 150lb in covid and lost to mid 130s with calorie deficit and exercise. Tried this while she was taking prozac but didn't prevent gain. After out of her system she tried to do this again with no weight loss. Now, weight up and down 4lb despite all her efforts.     145lb when starting the ssri.       Wt Readings from Last 5 Encounters:   11/20/24 78.1 kg (172 lb 3.2 oz)   08/05/24 79 kg (174 lb 1.6 oz)   07/17/24 77.7 kg (171 lb 6.4 oz)   02/12/24 74.8 kg (165 lb)   08/02/23 73.7 kg (162 lb 8 oz)            11/20/2024     6:44 AM   Diet Recall Review " with Patient   If you do eat breakfast, what types of food do you eat? protein bar or protein pancake or greek yogurt with toast with pb protein or protein bagel with cream cheese and veggies   If you do eat lunch, what types of food do you typically eat? snack plate with all the food groups   If you do eat supper, what types of food do you typically eat? meat veggies and rice or chickpea pasta or potatoes   If you do snack, what types of food do you typically eat? fruit veggies crackers with protein   How many glasses of juice do you drink in a typical day? 0   How many of glasses of milk do you drink in a typical day? 0   How many 8oz glasses of sugar containing drinks such as Theodore-Aid/sweet tea do you drink in a day? 0   How many cans/bottles of sugar pop/soda/tea/sports drinks do you drink in a day? 0   How many cans/bottles of diet pop/soda/tea or sports drink do you drink in a day? 0   How often do you have a drink of alcohol? Monthly or Less   If you do drink, how many drinks might you have in a day? 1 or 2     Tracks with my fitness pal   If planning ahead she feels appetite is well controlled -sometimes feeling full before getting to her calorie goal   If not planning, she is hungry and has already consumed her calorie goals    Can identify less hungry if paying attention to protein - typically eating 50-60g daily     Tries to avoid restricting food groups         11/20/2024     6:44 AM   Eating Habits   Generally, my meals include foods like these bread, pasta, rice, potatoes, corn, crackers, sweet dessert, pop, or juice Almost Everyday   Generally, my meals include foods like these fried meats, brats, burgers, french fries, pizza, cheese, chips, or ice cream Once a Week   Eat fast food (like McDonalds, [employer], Taco Bell) Never   Eat at a buffet or sit-down restaurant Never   Eat most of my meals in front of the TV or computer Almost Everyday   Often skip meals, eat at random times, have no regular  eating times Never   Rarely sit down for a meal but snack or graze throughout Less Than Weekly   Eat extra snacks between meals Once a Week   Eat most of my food at the end of the day Less Than Weekly   Eat in the middle of the night or wake up at night to eat Never   Eat extra snacks to prevent or correct low blood sugar Never   Eat to prevent acid reflux or stomach pain Less Than Weekly   Worry about not having enough food to eat Never   I eat when I am depressed Never   I eat when I am stressed Once a Week   I eat when I am bored Once a Week   I eat when I am anxious Once a Week   I eat when I am happy or as a reward Never   I feel hungry all the time even if I just have eaten Less Than Weekly   Feeling full is important to me A Few Times a Week   I finish all the food on my plate even if I am already full Less Than Weekly   I can't resist eating delicious food or walk past the good food/smell Less Than Weekly   I eat/snack without noticing that I am eating Less Than Weekly   I eat when I am preparing the meal Never   I eat more than usual when I see others eating Less Than Weekly   I have trouble not eating sweets, ice cream, cookies, or chips if they are around the house Less Than Weekly   I think about food all day Once a Week   What foods, if any, do you crave? Sweets/Candy/Chocolate           11/20/2024     6:44 AM   Amount of Food   I feel out of control when eating Monthly   I eat a large amount of food, like a loaf of bread, a box of cookies, a pint/quart of ice cream, all at once Never   I eat a large amount of food even when I am not hungry Never   I eat rapidly Monthly   I eat alone because I feel embarrassed and do not want others to see how much I have eaten Never   I eat until I am uncomfortably full Never   I feel bad, disgusted, or guilty after I overeat Weekly           11/20/2024     6:44 AM   Activity/Exercise History   How much of a typical 12 hour day do you spend sitting? Half the Day   How  much of a typical 12 hour day do you spend lying down? Less Than Half the Day   How much of a typical day do you spend walking/standing? Half the Day   How many hours (not including work) do you spend on the TV/Video Games/Computer/Tablet/Phone? 2-3 Hours   How many times a week are you active for the purpose of exercise? 4-5 TImes a Week   What keeps you from being more active? Other   How many total minutes do you spend doing some activity for the purpose of exercising when you exercise? More Than 30 Minutes     [place] [employer] 3-4 days a week     PAST MEDICAL HISTORY:  Past Medical History:   Diagnosis Date     ADHD (attention deficit hyperactivity disorder)      Anxiety      Dyspareunia, female            11/20/2024     6:44 AM   Work/Social History Reviewed With Patient   My employment status is Laid-Off   My job    How much of your job is spent on the computer or phone? 100%   How many hours do you spend commuting to work daily? 0   What is your marital status? /In a Relationship   If in a relationship, is your significant other overweight? No   Who do you live with? Fiance   Who does the food shopping? Both of us           11/20/2024     6:44 AM   Mental Health History Reviewed With Patient   Have you ever been physically or sexually abused? No   How often in the past 2 weeks have you felt little interest or pleasure in doing things? Not at all   Over the past 2 weeks how often have you felt down, depressed, or hopeless? Not at all           11/20/2024     6:44 AM   Sleep History Reviewed With Patient   How many hours do you sleep at night? 7       MEDICATIONS:   Current Outpatient Medications   Medication Sig Dispense Refill     calcium carbonate (TUMS) 500 MG chewable tablet daily for heartburn       cetirizine (ZYRTEC) 10 MG tablet Take 10 mg by mouth       famotidine (PEPCID) 40 MG tablet Take 1 tablet by mouth daily       fluticasone (FLONASE) 50 MCG/ACT nasal spray         glutamine 500 MG capsule Take 500 mg by mouth 4 times daily       lactobacillus rhamnosus, GG, (CULTURELL) capsule Take 1 capsule by mouth 2 times daily       metFORMIN (GLUCOPHAGE XR) 500 MG 24 hr tablet Take 4 tablets (2,000 mg) by mouth daily. 120 tablet 3     SOOLANTRA 1 % cream APPLY TOPICALLY TO FACE IN THE MORNING FOR ROSACEA         ALLERGIES:   Allergies   Allergen Reactions     Bee Venom Swelling     Severe swelling at sting site     Amoxicillin Diarrhea, GI Disturbance and Nausea     Bees      Severe swelling at sting site     Capsaicin Itching     Cherry Hives     Dust Mites      [place]     Hpv Vaccine Recombinant (Yeast Derived) [Human Papillomavirus 4-Valent Recombinant Vaccine]      Sores in mouth and vagina     Ibuprofen-Diphenhydramine Cit Diarrhea and Nausea     Kiwi Hives     Mangifera Indica Hives     Other [No Clinical Screening - See Comments] Nausea and Itching     Bell peppers     Piper Hives       Office Visit on 07/17/2024   Component Date Value Ref Range Status     WBC Count 07/17/2024 9.9  4.0 - 11.0 10e3/uL Final     RBC Count 07/17/2024 4.43  3.80 - 5.20 10e6/uL Final     Hemoglobin 07/17/2024 11.9  11.7 - 15.7 g/dL Final     Hematocrit 07/17/2024 37.3  35.0 - 47.0 % Final     MCV 07/17/2024 84  78 - 100 fL Final     MCH 07/17/2024 26.9  26.5 - 33.0 pg Final     MCHC 07/17/2024 31.9  31.5 - 36.5 g/dL Final     RDW 07/17/2024 13.8  10.0 - 15.0 % Final     Platelet Count 07/17/2024 337  150 - 450 10e3/uL Final       Anti-obesity medication ROS:    HEENT  Hx of glaucoma: No    Cardiovascular  CAD:No  HTN:No    Gastrointestinal  GERD: famotidine daily, known hiatal hernia   Constipation/diarrhea/GI issues:No- some constipation just recently   Liver Dz:No  Computed FIB-4 Calculation unavailable. One or more values for this score either were not found within the given timeframe or did not fit some other criterion.    H/O Pancreatitis:No    Psychiatric  Bipolar:  "No  Anxiety:Yes  Depression:No  History of alcohol/drug abuse: No  Hx of eating disorder:No    Endocrine  Personal or family hx of MTC or MEN2:No  Diabetes/prediabetes: No    Neurologic:  Hx of seizures: No  Hx of migraines: No  Memory Impairment: No  Chronic pain/opioids use: No      History of kidney stones: No  Kidney disease: No  Current birth control:  not currently on birth control, doesn't plan to have children for 3 years, uses condoms currently        5/26/2021    10:09 AM 11/20/2024     5:56 AM   MADDY Score (Last Two)   MADDY Raw Score 40 40    Activation Score 100 100    MADDY Level 4 4        Patient-reported         PHYSICAL EXAM:  Objective   BP (!) 135/95 (BP Location: Left arm, Patient Position: Sitting, Cuff Size: Adult Regular)   Pulse 86   Ht 1.613 m (5' 3.5\")   Wt 78.1 kg (172 lb 3.2 oz)   SpO2 99%   BMI 30.03 kg/m    Physical Exam   GENERAL: alert and no distress  EYES: Eyes grossly normal to inspection.  No discharge or erythema, or obvious scleral/conjunctival abnormalities.  RESP: No audible wheeze, cough, or visible cyanosis.    SKIN: Visible skin clear. No significant rash, abnormal pigmentation or lesions.  NEURO: Cranial nerves grossly intact.  Mentation and speech appropriate for age.  PSYCH: Appropriate affect, tone, and pace of words     Computed FIB-4 Calculation unavailable. One or more values for this score either were not found within the given timeframe or did not fit some other criterion.    Fib-4 < 1.3: No further evaluation at this point, unless other concerns  - If the Fib-4 is > 2.67,  Fibroscan and elective liver clinic referral  - Intermediate Fib-4 scores: Get a Fibroscan, consider repeating this in 1-2 years.    Sincerely,    Chiqui Jauregui NP            Again, thank you for allowing me to participate in the care of your patient.      Sincerely,    Chiqui Jauregui NP    "

## 2024-11-21 PROBLEM — E66.811 CLASS 1 OBESITY WITHOUT SERIOUS COMORBIDITY WITH BODY MASS INDEX (BMI) OF 30.0 TO 30.9 IN ADULT, UNSPECIFIED OBESITY TYPE: Status: ACTIVE | Noted: 2024-11-21

## 2024-11-22 NOTE — ASSESSMENT & PLAN NOTE
Has always identified as carrying extra weight. Gradual gain over time but weight always felt manageable. Has been able to lose up to 15lb with diet and lifestyle changes but not changes had not been sustainable. [facility] 25lb weight gain with starting prozac in 2023 despite efforts to stop gain while taking. Ultimately, prozac was stopped but has not been able to lose the weight she had gained. She started metformin a few months ago without weight loss. She expresses desire to lose back to weight she was prior to taking prozac and has concerns about health consequences from weight gain. Comorbidities include heavy periods, reflux, ocd and anxiety.     She has been tracking her intake with [employer] this past 6 months; eating between 1600 and 1700 calories at least 5 days a week- usually getting close to 50-60g protein unless she isn't planning ahead. Has more hunger if she isn't getting higher protein diet and more difficult to meet her goals when not planning her meals. Avoiding restricting food groups. She goes to [employer] 3 days a week. On the weekend, she has more difficulty with tracking given she feels like tracking is all or nothing, difficult to stick to the plan.     Discussed 80/20 rule rather than all or nothing. Based on BMR on tanita scale of 1424 she may need to reduce caloric intake to achieve weight loss. With amount of exercise she is doing, she may need to increase protein to build muscle. Recommend follow up with dietitian to further discuss.    Could consider  antiobesity medications if hunger/ cravings become problematic. Tolerating metformin, less heavy periods while taking. No adverse side effects. Will try maximizing metformin.     Increase metformin   Try increasing protein to 80g daily   Try reducing calories to 1400 calories daily   Continue with [employer]   Keep up great work with hydration   See Dietitian in December   Follow up with me in 3  months   Can consider stimulant or bupropion

## 2024-12-09 ENCOUNTER — TELEPHONE (OUTPATIENT)
Dept: ENDOCRINOLOGY | Facility: CLINIC | Age: 29
End: 2024-12-09
Payer: COMMERCIAL

## 2024-12-09 NOTE — TELEPHONE ENCOUNTER
Attempt to contact pharmacy to discuss patient's Metformin rx. Waited on hold for 20 minutes. Call was ended without speaking to anyone.

## 2024-12-16 NOTE — PROGRESS NOTES
"Video-Visit Details    Type of service:  Video Visit    Video Start Time: 10:00 AM   Video End Time: 10:28 AM    Originating Location (pt. Location): Home    Distant Location (provider location):  Offsite (providers home) [facility] [place]     Platform used for Video Visit: [employer]    New Weight Management Nutrition Consultation    Mariposa Nunez is a 29 year old female presents today for new weight management nutrition consultation.  Patient referred by Chiqui Jauregui NP on 2024.    Patient with Co-morbidities of obesity includin/20/2024     6:44 AM   --   I have the following health issues associated with obesity GERD (Reflux)   I have the following symptoms associated with obesity None of the above         Anthropometrics:  Initial Consult with NP:  Estimated body mass index is 30.03 kg/m  as calculated from the following:    Height as of 24: 1.613 m (5' 3.5\").    Weight as of 24: 78.1 kg (172 lb 3.2 oz).    Tanita Scale:  Date: 24  BW: 172.2 lbs  Fat%: 42.8% (desirable range: 21-32.9%)  Muscle%: 64.2%  Muscle Mass: 93.4 lbs  Visceral Fat Ratin (optimal less than 13)   BMR: 1424 kcal    Current:  Estimated body mass index is 28.84 kg/m  as calculated from the following:    Height as of this encounter: 1.626 m (5' 4\").    Weight as of this encounter: 76.2 kg (168 lb). (-4 lbs form initial)      Medications for Weight Loss:  Metformin    NUTRITION HISTORY  Food allergies: peppers, esmer, kiwi and cherries  Food intolerances: Lactose; whey and soy make her throw up.   Doesn't eat pork.    Vitamin/Mineral Supplements: None currently.  Previous methods of diet modification for weight loss: Various diets, calorie counting    Per NP consult: She has been tracking her intake with tyler[employer]dennis pal this past 6 months; eating between 1600 and 1700 calories at least 5 days a week- usually getting close to 50-60g protein unless she isn't planning " ahead. Has more hunger if she isn't getting higher protein diet and more difficult to meet her goals when not planning her meals. Avoiding restricting food groups. She goes to [employer] 3 days a week. On the weekend, she has more difficulty with tracking given she feels like tracking is all or nothing, difficult to stick to the plan.   Discussed 80/20 rule rather than all or nothing. Based on BMR on tanita scale of 1424 she may need to reduce caloric intake to achieve weight loss. With amount of exercise she is doing, she may need to increase protein to build muscle. Recommend follow up with dietitian to further discuss    Today - Increased to 3 tablets of metformin. Did have upset stomach and diarrhea when first starting but now more constipation.In the last 2-3 weeks has been tracking calorie and protein intake consistently. Is consuming 1400 ronnie/d or less daily and finding this manageable to adhere to with increase in metformin and protein intake. Reports feeling very well fed with 65-70 gm protein daily.     Recently laid off so routine has changed. Waking around 9 am, drinking water, breakfast at 10 am. Aims for 30 gm protein at breakfast, doesn't get hungry again until 2-3 pm and isn't hungry for a full meal.   Doesn't love meat, so has been incorporating more plant-based protein.   H/o hiatal hernia. Reports an increase in reflux lately. Taking Pepcid.     Recent Diet Recall:  Breakfast: 10 am Vegan protein drink - 20 gm pro, 150 ronnie; protein cereal with Fairlife milk; greek yogurt with fruit  Lunch: 2-3 pm similar choices to bfast; small salad with chicken; protein tortillas roll-up  Dinner: 7 pm ban pasta; beyond meat; tofu; edemame with veggies and fruit   Snacks: 9 pm oatly ice cream   Beverages: Water (80 oz), occ tea/coffee, diet coke             11/20/2024     6:44 AM   Diet Recall Review with Patient   If you do eat breakfast, what types of food do you eat? protein bar or protein pancake or greek  yogurt with toast with pb protein or protein bagel with cream cheese and veggies   If you do eat lunch, what types of food do you typically eat? snack plate with all the food groups   If you do eat supper, what types of food do you typically eat? meat veggies and rice or chickpea pasta or potatoes   If you do snack, what types of food do you typically eat? fruit veggies crackers with protein   How many glasses of juice do you drink in a typical day? 0   How many of glasses of milk do you drink in a typical day? 0   How many 8oz glasses of sugar containing drinks such as Theodore-Aid/sweet tea do you drink in a day? 0   How many cans/bottles of sugar pop/soda/tea/sports drinks do you drink in a day? 0   How many cans/bottles of diet pop/soda/tea or sports drink do you drink in a day? 0   How often do you have a drink of alcohol? Monthly or Less   If you do drink, how many drinks might you have in a day? 1 or 2           11/20/2024     6:44 AM   Eating Habits   Generally, my meals include foods like these bread, pasta, rice, potatoes, corn, crackers, sweet dessert, pop, or juice Almost Everyday   Generally, my meals include foods like these fried meats, brats, burgers, french fries, pizza, cheese, chips, or ice cream Once a Week   Eat fast food (like McDonalds, Burger Inocente, Taco Bell) Never   Eat at a buffet or sit-down restaurant Never   Eat most of my meals in front of the TV or computer Almost Everyday   Often skip meals, eat at random times, have no regular eating times Never   Rarely sit down for a meal but snack or graze throughout Less Than Weekly   Eat extra snacks between meals Once a Week   Eat most of my food at the end of the day Less Than Weekly   Eat in the middle of the night or wake up at night to eat Never   Eat extra snacks to prevent or correct low blood sugar Never   Eat to prevent acid reflux or stomach pain Less Than Weekly   Worry about not having enough food to eat Never   I eat when I am depressed  Never   I eat when I am stressed Once a Week   I eat when I am bored Once a Week   I eat when I am anxious Once a Week   I eat when I am happy or as a reward Never   I feel hungry all the time even if I just have eaten Less Than Weekly   Feeling full is important to me A Few Times a Week   I finish all the food on my plate even if I am already full Less Than Weekly   I can't resist eating delicious food or walk past the good food/smell Less Than Weekly   I eat/snack without noticing that I am eating Less Than Weekly   I eat when I am preparing the meal Never   I eat more than usual when I see others eating Less Than Weekly   I have trouble not eating sweets, ice cream, cookies, or chips if they are around the house Less Than Weekly   I think about food all day Once a Week   What foods, if any, do you crave? Sweets/Candy/Chocolate           11/20/2024     6:44 AM   Amount of Food   I feel out of control when eating Monthly   I eat a large amount of food, like a loaf of bread, a box of cookies, a pint/quart of ice cream, all at once Never   I eat a large amount of food even when I am not hungry Never   I eat rapidly Monthly   I eat alone because I feel embarrassed and do not want others to see how much I have eaten Never   I eat until I am uncomfortably full Never   I feel bad, disgusted, or guilty after I overeat Weekly       Physical Activity:  [employer], 3 days weekly. Walking other days.          11/20/2024     6:44 AM   Activity/Exercise History   How much of a typical 12 hour day do you spend sitting? Half the Day   How much of a typical 12 hour day do you spend lying down? Less Than Half the Day   How much of a typical day do you spend walking/standing? Half the Day   How many hours (not including work) do you spend on the TV/Video Games/Computer/Tablet/Phone? 2-3 Hours   How many times a week are you active for the purpose of exercise? 4-5 TImes a Week   What keeps you from being more active? Other   How  many total minutes do you spend doing some activity for the purpose of exercising when you exercise? More Than 30 Minutes       Nutrition Prescription  Recommended energy/nutrient modification.  1400 calories/day   80 gm protein/day (progressive)    Nutrition Diagnosis  Obesity r/t long history of positive energy balance aeb BMI >30.    Nutrition Intervention  Materials/education provided on hypocaloric diet for weight loss. Discussed 1400 calorie/day diet, eating to help satiety level on fewer calories; portion control and healthy food choices (Plate Method and Volumetrics handouts), lower calorie snack choices, meal and snack planning tips and resources. Patient demonstrates understanding.  Co-developed goals to work towards.   Provided pt with list of goals and resources below via [employer].     Expected Engagement: good  Follow-Up Plans: increasing protein to support muscle synthesis     Nutrition Goals  1) Increase water and fiber intake to help with constipation  - Add berries to protein shake for extra fiber   2) Continue tracking calories and protein   -1400 calories or less per day   - Work up to 80 gm daily as able (minimum 60 gm daily)  3) Keep up the excellent work with exercise!    Tips for Managing Reflux:  - Eat slowly (20-30 minutes per meal), chewing foods well (25 chews per bite/applesauce consistency)  - Wait 3 hours after eating before lying down.  - Eat in a calm, relaxed place. Sit down while you eat.  - Avoid intake of high-fat foods (fried foods, processed meats (sausages, hot dogs, salami), high-fat meat, pastries, cookies, candy, full-fat dairy, nuts/nut butter, added oils/butter), alcohol, caffeine, peppermint, spicy foods, chocolate   - Smaller meal volumes     The Plate Method  [email or web address]    Protein Sources   [email or web address]://Sunrise [email or web address]     Carbohydrates  [email or web address]     Mindful Eating  [email or web address]://Sunrise [email or web address]     Summary of Volumetrics Eating  Plan  [email or web address]://[email or web address]     Example High-Protein Plant-Based Meals:  Black Bean Quinoa Bowl: https://www.Projectioneering/recipe/990486/black-bean-quinoa-buddha-bowl/  Super Food Grain Bowl: https://www.ARTA Bioscience.Tagent/recipe/402457/vegan-superfood-grain-bowls/  Citrus Lime Tofu Salad: https://www.Projectioneering/recipe/813241/citrus-lime-tofu-salad/  Egg Scramble with Cottage Cheese (contains eggs, dairy): https://www.Virdocs Software/high-protein-scrambled-eggs-with-cottage-cheese/  Green Shakshuka (contains eggs): https://www.Virdocs Software/green-shakshuka/  Air Fryer Peanut Herrera Tofu with Gingery Cauliflower Rice: https://www.Virdocs Software/air-fryer-peanut-herrera-tofu/  Lentil Bolognese: https://www.Virdocs Software/lentil-bolognese/  Tempeh Tacos: https://Mebelrama/tempeh-tacos/#recipe    High-Protein Plant Based Recipe Resources:  https://www.ARTA Bioscience.com/article/2249307/high-protein-vegan-diet-meal-plan/  https://www.[employer]I Corporation.Tagent/recipe-categories/high-protein  https://www.Vello Systems.Tagent/best-high-protein-vegan-breakfast-recipes/  https://www.ARTA Bioscience.Tagent/plant-based-foods-with-more-protein-than-an-egg-3572381  https://www.[employer] Bioscience.Tagent/article/3240583/plant-based-snacks-that-pack-a-lot-of-protein/      Follow-Up:  2/17/25    Time spent with patient: 28 minutes.  Kinga Marshall, SILAS, LD

## 2024-12-17 ENCOUNTER — VIRTUAL VISIT (OUTPATIENT)
Dept: ENDOCRINOLOGY | Facility: CLINIC | Age: 29
End: 2024-12-17
Payer: COMMERCIAL

## 2024-12-17 VITALS — BODY MASS INDEX: 28.68 KG/M2 | WEIGHT: 168 LBS | HEIGHT: 64 IN

## 2024-12-17 DIAGNOSIS — Z71.3 NUTRITIONAL COUNSELING: ICD-10-CM

## 2024-12-17 DIAGNOSIS — E66.811 CLASS 1 OBESITY WITHOUT SERIOUS COMORBIDITY WITH BODY MASS INDEX (BMI) OF 30.0 TO 30.9 IN ADULT, UNSPECIFIED OBESITY TYPE: Primary | ICD-10-CM

## 2024-12-17 PROCEDURE — 97802 MEDICAL NUTRITION INDIV IN: CPT | Mod: 95 | Performed by: DIETITIAN, REGISTERED

## 2024-12-17 PROCEDURE — 99207 PR NO CHARGE LOS: CPT | Mod: 95 | Performed by: DIETITIAN, REGISTERED

## 2024-12-17 ASSESSMENT — PAIN SCALES - GENERAL: PAINLEVEL_OUTOF10: NO PAIN (0)

## 2024-12-17 NOTE — NURSING NOTE
Is the patient currently in the state of [place]? YES    Visit mode:VIDEO    If the visit is dropped, the patient can be reconnected by: VIDEO VISIT: Text to cell phone:   Telephone Information:   Mobile [phone]       Will anyone else be joining the visit? NO  (If patient encounters technical issues they should call [phone]    How would you like to obtain your AVS? MyChart    Are changes needed to the allergy or medication list? No    Are refills needed on medications prescribed by this physician? NO    Reason for visit: RITESH MCCRAY

## 2024-12-17 NOTE — PATIENT INSTRUCTIONS
Tommy Monge,    Follow-up with dietitian on 2/17    Thank you,    Kinga Marshall, SILAS, LD  If you would like to schedule or reschedule an appointment with the RD, please call [phone]    Nutrition Goals  1) Increase water and fiber intake to help with constipation  - Add berries to protein shake for extra fiber   2) Continue tracking calories and protein   -1400 calories or less per day   - Work up to 80 gm daily as able (minimum 60 gm daily)  3) Keep up the excellent work with exercise!    Tips for Managing Reflux:  - Eat slowly (20-30 minutes per meal), chewing foods well (25 chews per bite/applesauce consistency)  - Wait 3 hours after eating before lying down.  - Eat in a calm, relaxed place. Sit down while you eat.  - Avoid intake of high-fat foods (fried foods, processed meats (sausages, hot dogs, salami), high-fat meat, pastries, cookies, candy, full-fat dairy, nuts/nut butter, added oils/butter), alcohol, caffeine, peppermint, spicy foods, chocolate   - Smaller meal volumes     The Plate Method  [email or web address]    Protein Sources   http://[email or web address]     Carbohydrates  [email or web address]://[email or web address]     Mindful Eating  [email or web address]://[email or web address]     Summary of Volumetrics Eating Plan  [email or web address]     Example High-Protein Plant-Based Meals:  Black Bean Quinoa Bowl: https://www.Sponto.SSN Logistics/recipe/107822/black-bean-quinoa-buddha-bowl/  Super Food Grain Bowl: https://www.Sponto.SSN Logistics/recipe/863364/vegan-superfood-grain-bowls/  Citrus Lime Tofu Salad: https://www.Sponto.SSN Logistics/recipe/011966/citrus-lime-tofu-salad/  Egg Scramble with Cottage Cheese (contains eggs, dairy): https://www.Sleep Solutions/high-protein-scrambled-eggs-with-cottage-cheese/  Green Shakshuka (contains eggs): https://www.Sleep Solutions/green-Mirens Inckshuka/  Air Fryer Peanut Herrera Tofu with Gingery Cauliflower Rice: https://www.Sleep Solutions/air-fryer-peanut-herrera-tofu/  Lentil Bolognese:  https://www.Swarmforce/lentil-bolognese/  Tempeh Tacos: https://LATTO/tempeh-tacos/#recipe    High-Protein Plant Based Recipe Resources:  https://www.Playdom.K94 Discoveries/article/9400684/high-protein-vegan-diet-meal-plan/  https://www.The Clearing/recipe-categories/high-protein  https://www.Solstice Supply/best-high-protein-vegan-breakfast-recipes/  https://www.Playdom.K94 Discoveries/plant-based-foods-with-more-protein-than-an-egg-3459804  https://www.Playdom.K94 Discoveries/article/9308009/plant-based-snacks-that-pack-a-lot-of-protein/                  [facility]   Healthy Lifestyle Group    Healthy Lifestyle Coaching Group?  This is a 60 minute virtual coaching group for those who want to lead a healthier lifestyle. Come together to set goals and overcome barriers in a supportive group environment. We will address the four pillars of health--nutrition, exercise, sleep and emotional well-being. This group is highly recommended for those who are participating in the 24 week Healthy Lifestyle Plan and our Health Coaching sessions. All are welcome!    WHEN: This group meets the first Friday of the month, 12:30 PM - 1:30 PM online, via a zoom meeting.      FACILITATOR: Led by National Board Certified Health and , Julieth Ayers [facility].     TO REGISTER: Please call the Call Center at [phone] to register. You will get an appointment to attend in [employer]. Fifteen minutes prior to the meeting, complete the e-check in and you will get the link to join the meeting.  There is no charge to attend this group and space is limited.   Please register for each month you wish to attend    PLEASE NOTE: There will be NO GROUP on March 7 and July 4, 2025 2024 and 2025 GROUP MEETING DATES:   October 4, 2024  November 1, 2024  December 6, 2024  January 3, 2025  February 7, 2025  No meeting March 7, 2025  April 4, 2025  May 2, 2025  June 6, 2025  No meeting July 4,  2025 August 1, 2025      Work with A Health !  Virtual 1:1 Sessions are Available through [facility]    To learn more, call to schedule a free, Health  Q&A appointment: [phone]     What is Health Coaching?  Do you know what you are supposed to do, but you just aren't doing it?  Then, HEALTH COACHING may help you!   Get unstuck and move forward with the support of a professionally trained [facility]-HWC (National Board-Certified Health and ) who uses evidence-based approaches to help you move forward with healthy lifestyle changes in the areas of weight loss, stress management and overall well-being.    Health Coaches help you identify goals that will work best for you. Health Coaches provide support and encouragement with overcoming barriers and help you to find inspiration and motivation to lead a healthy lifestyle.    Option one:  Health Coaching 3-Pack; Three, 30-minute Health Coaching Visits, for $99  Visits are done virtually (phone or video)  This is a self pay service; we do not accept insurance for mike coaching.    Option two:   The 24 week Plan; 11 Health Coaching Visits, and a 7 months subscription to [employer]- on-demand fitness, nutrition and mindfulness classes, for $499 (employee discounts may be available). Participants will also meet regularly with a weight management Medical Provider and a Registered/Licensed Dietician.  This is a self-pay service; we do not accept insurance for health coaching.    To Schedule a free Health  Q&A appointment to learn more,  call [phone].

## 2024-12-17 NOTE — LETTER
"2024       RE: Mariposa Nunez   Mariam Marshall  [facility] [id number]     Dear Colleague,    Thank you for referring your patient, Mariposa Nunez, to the [facility] WEIGHT MANAGEMENT CLINIC [place] at [facility]. Please see a copy of my visit note below.    Video-Visit Details    Type of service:  Video Visit    Video Start Time: 10:00 AM   Video End Time: 10:28 AM    Originating Location (pt. Location): Home    Distant Location (provider location):  Offsite (providers home) [facility] WE[facility]NA[facility]     Platform used for Video Visit: [employer]    New Weight Management Nutrition Consultation    Mariposa Nunez is a 29 year old female presents today for new weight management nutrition consultation.  Patient referred by Chiqui Jauregui NP on 2024.    Patient with Co-morbidities of obesity includin/20/2024     6:44 AM   --   I have the following health issues associated with obesity GERD (Reflux)   I have the following symptoms associated with obesity None of the above         Anthropometrics:  Initial Consult with NP:  Estimated body mass index is 30.03 kg/m  as calculated from the following:    Height as of 24: 1.613 m (5' 3.5\").    Weight as of 24: 78.1 kg (172 lb 3.2 oz).    Tanita Scale:  Date: 24  BW: 172.2 lbs  Fat%: 42.8% (desirable range: 21-32.9%)  Muscle%: 64.2%  Muscle Mass: 93.4 lbs  Visceral Fat Ratin (optimal less than 13)   BMR: 1424 kcal    Current:  Estimated body mass index is 28.84 kg/m  as calculated from the following:    Height as of this encounter: 1.626 m (5' 4\").    Weight as of this encounter: 76.2 kg (168 lb). (-4 lbs form initial)      Medications for Weight Loss:  Metformin    NUTRITION HISTORY  Food allergies: peppers, esmer, kiwi and cherries  Food intolerances: Lactose; whey and soy make her throw up.   Doesn't eat pork.    Vitamin/Mineral " Supplements: None currently.  Previous methods of diet modification for weight loss: Various diets, calorie counting    Per NP consult: She has been tracking her intake with [employer] pal this past 6 months; eating between 1600 and 1700 calories at least 5 days a week- usually getting close to 50-60g protein unless she isn't planning ahead. Has more hunger if she isn't getting higher protein diet and more difficult to meet her goals when not planning her meals. Avoiding restricting food groups. She goes to [employer] 3 days a week. On the weekend, she has more difficulty with tracking given she feels like tracking is all or nothing, difficult to stick to the plan.   Discussed 80/20 rule rather than all or nothing. Based on BMR on tanita scale of 1424 she may need to reduce caloric intake to achieve weight loss. With amount of exercise she is doing, she may need to increase protein to build muscle. Recommend follow up with dietitian to further discuss    Today - Increased to 3 tablets of metformin. Did have upset stomach and diarrhea when first starting but now more constipation.In the last 2-3 weeks has been tracking calorie and protein intake consistently. Is consuming 1400 ronnie/d or less daily and finding this manageable to adhere to with increase in metformin and protein intake. Reports feeling very well fed with 65-70 gm protein daily.     Recently laid off so routine has changed. Waking around 9 am, drinking water, breakfast at 10 am. Aims for 30 gm protein at breakfast, doesn't get hungry again until 2-3 pm and isn't hungry for a full meal.   Doesn't love meat, so has been incorporating more plant-based protein.   H/o hiatal hernia. Reports an increase in reflux lately. Taking Pepcid.     Recent Diet Recall:  Breakfast: 10 am Vegan protein drink - 20 gm pro, 150 ronnie; protein cereal with Fairlife milk; greek yogurt with fruit  Lunch: 2-3 pm similar choices to bfast; small salad with chicken; protein tortillas  roll-up  Dinner: 7 pm ban pasta; beyond meat; tofu; edemame with veggies and fruit   Snacks: 9 pm oatly ice cream   Beverages: Water (80 oz), occ tea/coffee, diet coke             11/20/2024     6:44 AM   Diet Recall Review with Patient   If you do eat breakfast, what types of food do you eat? protein bar or protein pancake or greek yogurt with toast with pb protein or protein bagel with cream cheese and veggies   If you do eat lunch, what types of food do you typically eat? snack plate with all the food groups   If you do eat supper, what types of food do you typically eat? meat veggies and rice or chickpea pasta or potatoes   If you do snack, what types of food do you typically eat? fruit veggies crackers with protein   How many glasses of juice do you drink in a typical day? 0   How many of glasses of milk do you drink in a typical day? 0   How many 8oz glasses of sugar containing drinks such as Theodore-Aid/sweet tea do you drink in a day? 0   How many cans/bottles of sugar pop/soda/tea/sports drinks do you drink in a day? 0   How many cans/bottles of diet pop/soda/tea or sports drink do you drink in a day? 0   How often do you have a drink of alcohol? Monthly or Less   If you do drink, how many drinks might you have in a day? 1 or 2           11/20/2024     6:44 AM   Eating Habits   Generally, my meals include foods like these bread, pasta, rice, potatoes, corn, crackers, sweet dessert, pop, or juice Almost Everyday   Generally, my meals include foods like these fried meats, brats, burgers, french fries, pizza, cheese, chips, or ice cream Once a Week   Eat fast food (like McDonalds, [employer], Taco Bell) Never   Eat at a buffet or sit-down restaurant Never   Eat most of my meals in front of the TV or computer Almost Everyday   Often skip meals, eat at random times, have no regular eating times Never   Rarely sit down for a meal but snack or graze throughout Less Than Weekly   Eat extra snacks between meals Once a  Week   Eat most of my food at the end of the day Less Than Weekly   Eat in the middle of the night or wake up at night to eat Never   Eat extra snacks to prevent or correct low blood sugar Never   Eat to prevent acid reflux or stomach pain Less Than Weekly   Worry about not having enough food to eat Never   I eat when I am depressed Never   I eat when I am stressed Once a Week   I eat when I am bored Once a Week   I eat when I am anxious Once a Week   I eat when I am happy or as a reward Never   I feel hungry all the time even if I just have eaten Less Than Weekly   Feeling full is important to me A Few Times a Week   I finish all the food on my plate even if I am already full Less Than Weekly   I can't resist eating delicious food or walk past the good food/smell Less Than Weekly   I eat/snack without noticing that I am eating Less Than Weekly   I eat when I am preparing the meal Never   I eat more than usual when I see others eating Less Than Weekly   I have trouble not eating sweets, ice cream, cookies, or chips if they are around the house Less Than Weekly   I think about food all day Once a Week   What foods, if any, do you crave? Sweets/Candy/Chocolate           11/20/2024     6:44 AM   Amount of Food   I feel out of control when eating Monthly   I eat a large amount of food, like a loaf of bread, a box of cookies, a pint/quart of ice cream, all at once Never   I eat a large amount of food even when I am not hungry Never   I eat rapidly Monthly   I eat alone because I feel embarrassed and do not want others to see how much I have eaten Never   I eat until I am uncomfortably full Never   I feel bad, disgusted, or guilty after I overeat Weekly       Physical Activity:  [place] theory, 3 days weekly. Walking other days.          11/20/2024     6:44 AM   Activity/Exercise History   How much of a typical 12 hour day do you spend sitting? Half the Day   How much of a typical 12 hour day do you spend lying down? Less  Than Half the Day   How much of a typical day do you spend walking/standing? Half the Day   How many hours (not including work) do you spend on the TV/Video Games/Computer/Tablet/Phone? 2-3 Hours   How many times a week are you active for the purpose of exercise? 4-5 TImes a Week   What keeps you from being more active? Other   How many total minutes do you spend doing some activity for the purpose of exercising when you exercise? More Than 30 Minutes       Nutrition Prescription  Recommended energy/nutrient modification.  1400 calories/day   80 gm protein/day (progressive)    Nutrition Diagnosis  Obesity r/t long history of positive energy balance aeb BMI >30.    Nutrition Intervention  Materials/education provided on hypocaloric diet for weight loss. Discussed 1400 calorie/day diet, eating to help satiety level on fewer calories; portion control and healthy food choices (Plate Method and Volumetrics handouts), lower calorie snack choices, meal and snack planning tips and resources. Patient demonstrates understanding.  Co-developed goals to work towards.   Provided pt with list of goals and resources below via [employer].     Expected Engagement: good  Follow-Up Plans: increasing protein to support muscle synthesis     Nutrition Goals  1) Increase water and fiber intake to help with constipation  - Add berries to protein shake for extra fiber   2) Continue tracking calories and protein   -1400 calories or less per day   - Work up to 80 gm daily as able (minimum 60 gm daily)  3) Keep up the excellent work with exercise!    Tips for Managing Reflux:  - Eat slowly (20-30 minutes per meal), chewing foods well (25 chews per bite/applesauce consistency)  - Wait 3 hours after eating before lying down.  - Eat in a calm, relaxed place. Sit down while you eat.  - Avoid intake of high-fat foods (fried foods, processed meats (sausages, hot dogs, salami), high-fat meat, pastries, cookies, candy, full-fat dairy, nuts/nut butter,  added oils/butter), alcohol, caffeine, peppermint, spicy foods, chocolate   - Smaller meal volumes     The Plate Method  https://[email or web address]    Protein Sources   http://[employer]/873647.pdf     Carbohydrates  http://[email or web address]     Mindful Eating  http://[employer]/100239.pdf     Summary of Volumetrics Eating Plan  http://[email or web address]     Example High-Protein Plant-Based Meals:  Black Bean Quinoa Bowl: https://www.Concept3D/recipe/863987/black-bean-quinoa-buddha-bowl/  Super Food Grain Bowl: https://www.Concept3D/recipe/043552/vegan-superfood-grain-bowls/  Citrus Lime Tofu Salad: https://wwwDeutsche Startups/recipe/336557/citrus-lime-tofu-salad/  Egg Scramble with Cottage Cheese (contains eggs, dairy): https://www.ProofPilot/high-protein-scrambled-eggs-with-cottage-cheese/  Green Shakshuka (contains eggs): https://www.ProofPilot/green-shakshuka/  Air Fryer Peanut Herrera Tofu with Gingery Cauliflower Rice: https://www.ProofPilot/air-fryer-peanut-herrera-tofu/  Lentil Bolognese: https://www.ProofPilot/lentil-bolognese/  Tempeh Tacos: https://[employer].com/tempeh-tacos/#recipe    High-Protein Plant Based Recipe Resources:  https://www.Chumby.Etsy/article/0059600/high-protein-vegan-diet-meal-plan/  https://www.RunMyProcess.Etsy/recipe-categories/high-protein  https://www.[employer].Etsy/best-high-protein-vegan-breakfast-recipes/  https://www.Chumby.Etsy/plant-based-foods-with-more-protein-than-an-egg-6113242  https://www.Chumby.com/article/5028556/plant-based-snacks-that-pack-a-lot-of-protein/      Follow-Up:  2/17/25    Time spent with patient: 28 minutes.  Kinga Marshall RD, LD        Again, thank you for allowing me to participate in the care of your patient.      Sincerely,    Kinga Marshall RD

## 2025-01-13 ENCOUNTER — TELEPHONE (OUTPATIENT)
Dept: ENDOCRINOLOGY | Facility: CLINIC | Age: 30
End: 2025-01-13
Payer: COMMERCIAL

## 2025-01-13 NOTE — TELEPHONE ENCOUNTER
Left Voicemail (1st Attempt) and Sent Mychart (1st Attempt) for the patient to call back and reschedule the following:    Appointment type: Return Med WT MGMT Nutrition  Appointment mode: Virtual Visit  Previously scheduled with: Kinga Marshall  Reschedule with: Arabella New or Jinny Layne  Date: 2/17/25  Specialty phone number: [phone]    Additonal Notes:

## 2025-01-15 ENCOUNTER — TELEPHONE (OUTPATIENT)
Dept: ENDOCRINOLOGY | Facility: CLINIC | Age: 30
End: 2025-01-15
Payer: COMMERCIAL

## 2025-01-15 NOTE — TELEPHONE ENCOUNTER
Patient confirmed scheduled appointment:     Date: 2/25/2025  Time: 2pm  Visit type: Return Med WT MGMT Nutrition  Visit mode: Virtual Visit  Provider:  Jinny Layne  [place] [facility] Notes:   Rescheduled from 2/17 with MEENU Marshall

## 2025-02-18 ENCOUNTER — LAB (OUTPATIENT)
Dept: LAB | Facility: CLINIC | Age: 30
End: 2025-02-18
Payer: COMMERCIAL

## 2025-02-18 DIAGNOSIS — E66.811 CLASS 1 OBESITY WITHOUT SERIOUS COMORBIDITY WITH BODY MASS INDEX (BMI) OF 30.0 TO 30.9 IN ADULT, UNSPECIFIED OBESITY TYPE: ICD-10-CM

## 2025-02-18 LAB
ALBUMIN SERPL BCG-MCNC: 4.4 G/DL (ref 3.5–5.2)
ALP SERPL-CCNC: 68 U/L (ref 40–150)
ALT SERPL W P-5'-P-CCNC: 12 U/L (ref 0–50)
ANION GAP SERPL CALCULATED.3IONS-SCNC: 11 MMOL/L (ref 7–15)
AST SERPL W P-5'-P-CCNC: 21 U/L (ref 0–45)
BILIRUB SERPL-MCNC: 0.3 MG/DL
BUN SERPL-MCNC: 10.8 MG/DL (ref 6–20)
CALCIUM SERPL-MCNC: 9.8 MG/DL (ref 8.8–10.4)
CHLORIDE SERPL-SCNC: 101 MMOL/L (ref 98–107)
CREAT SERPL-MCNC: 0.61 MG/DL (ref 0.51–0.95)
EGFRCR SERPLBLD CKD-EPI 2021: >90 ML/MIN/1.73M2
GLUCOSE SERPL-MCNC: 108 MG/DL (ref 70–99)
HCO3 SERPL-SCNC: 26 MMOL/L (ref 22–29)
POTASSIUM SERPL-SCNC: 4.2 MMOL/L (ref 3.4–5.3)
PROT SERPL-MCNC: 8.1 G/DL (ref 6.4–8.3)
SODIUM SERPL-SCNC: 138 MMOL/L (ref 135–145)

## 2025-02-18 PROCEDURE — 36415 COLL VENOUS BLD VENIPUNCTURE: CPT

## 2025-02-18 PROCEDURE — 80053 COMPREHEN METABOLIC PANEL: CPT

## 2025-02-27 ENCOUNTER — VIRTUAL VISIT (OUTPATIENT)
Dept: FAMILY MEDICINE | Facility: CLINIC | Age: 30
End: 2025-02-27
Payer: COMMERCIAL

## 2025-02-27 DIAGNOSIS — G44.209 ACUTE NON INTRACTABLE TENSION-TYPE HEADACHE: Primary | ICD-10-CM

## 2025-02-27 DIAGNOSIS — J01.00 ACUTE MAXILLARY SINUSITIS, RECURRENCE NOT SPECIFIED: ICD-10-CM

## 2025-02-27 RX ORDER — CYCLOBENZAPRINE HCL 5 MG
5 TABLET ORAL
Qty: 10 TABLET | Refills: 0 | Status: SHIPPED | OUTPATIENT
Start: 2025-02-27

## 2025-02-27 RX ORDER — NAPROXEN 500 MG/1
500 TABLET ORAL 2 TIMES DAILY WITH MEALS
Qty: 14 TABLET | Refills: 0 | Status: SHIPPED | OUTPATIENT
Start: 2025-02-27 | End: 2025-03-06

## 2025-02-27 NOTE — PROGRESS NOTES
Mariposa is a 30 year old who is being evaluated via a billable video visit.    How would you like to obtain your AVS? MyChart  If the video visit is dropped, the invitation should be resent by: Text to cell phone: [phone]  Will anyone else be joining your video visit? No      Assessment & Plan     Acute non intractable tension-type headache  Was seen in [facility] Urgency room on 2/18/2025 the day after a workout with acute strain of her right trapezius resulting in acute headache pain that progressed the next day. Had a CT head, CTA head and neck that was normal and reassuring. She has continued to have some pains in superior and medial trapezius b/l and paraspinal muscles with some occipital neuritis triggering symptoms likely from tension at base of scalp. Using heating pads and tylenol. Recommend adding naproxen for a week and nightly muscle relaxer, recommend biofreeze, consider massage. If any worsening headaches or severe symptoms or associated lightheadedness, vision changes, vomiting to go back to the ED  - naproxen (NAPROSYN) 500 MG tablet; Take 1 tablet (500 mg) by mouth 2 times daily (with meals) for 7 days.  - cyclobenzaprine (FLEXERIL) 5 MG tablet; Take 1 tablet (5 mg) by mouth nightly as needed for muscle spasms.    Acute maxillary sinusitis, recurrence not specified  Recommend monitoring sinus symptoms and if not improving after 7-10 days recommend starting an antibiotic to treat sinusitis. Continue antihistamine, flonase and nasal rinses. Discussed that sinusitis can be contributing to facial,temple, forehead pain/pressure          Subjective   Mariposa is a 30 year old, presenting for the following health issues:  Headache    HPI      Headaches  How often are you getting headaches    Constant headache for the past 10 days  Are you able to do normal daily activities when you have a migraine? Yes  Are you taking rescue/relief medications? (Select all that apply) Tylenol  How helpful is your  rescue/relief medication?  No difference  Are you taking any medications to prevent migraines? (Select all that apply)  No  In the past 4 weeks, how often have you gone to urgent care or the emergency room because of your headaches?  yes      On 2/17/2025 was doing a strength workout class and doing overhead weights and noticed a strain in her right neck that became a strain on b/l temples but was mild. Was able to finish her workout. The next day her headache was getting worse, so went ot the urgency room. was having one off pinpoint pains and now settled into her temples or neck would hurt that would radiate up over head to eyes also, more on right side occasional left. Feels more pressure on temples. More on right neck.  Using heating pad. Feels knots in neck/shoulders. If touches trapezius will get a shooting pain into temple on that side. Taking tylenol and tylenol sinus. Doesn't take motrin since this upsets stomach.   No weakness in arms, no radiating pain down arms, no tinnitus.   Endorses some sinus pressure. Gets sinus infections a lot; typiclaly doesn't take antibiotics much, normally does rinses and takes tylenol sinus. Feels tight behind face/eyes and feels congested, more post-nasal drainage, has been present 5 days.      Went to [facility] Urgency room in [place] last week (2/18/2025) and had a normal CT scan of the head.    HEAD CT: 1. No acute intracranial process. HEAD CTA: 1. No significant stenosis, aneurysm, or high flow vascular malformation identified. 2. Variant Lime of [place] anatomy as above. NECK CTA: 1. Normal neck CTA.       Review of Systems  Constitutional, HEENT, cardiovascular, pulmonary, gi and gu systems are negative, except as otherwise noted.      Objective           Vitals:  No vitals were obtained today due to virtual visit.    Physical Exam   GENERAL: alert and no distress  EYES: Eyes grossly normal to inspection.  No discharge or erythema, or obvious scleral/conjunctival  abnormalities.  RESP: No audible wheeze, cough, or visible cyanosis.    SKIN: Visible skin clear. No significant rash, abnormal pigmentation or lesions.  NEURO: Cranial nerves grossly intact.  Mentation and speech appropriate for age.  PSYCH: Appropriate affect, tone, and pace of words          Video-Visit Details    Type of service:  Video Visit   Originating Location (pt. Location): Home    Distant Location (provider location):  On-site  Platform used for Video Visit: Piotr  Signed Electronically by: Carole Beavers DO

## 2025-03-03 ENCOUNTER — OFFICE VISIT (OUTPATIENT)
Dept: PEDIATRICS | Facility: CLINIC | Age: 30
End: 2025-03-03
Payer: COMMERCIAL

## 2025-03-03 VITALS
RESPIRATION RATE: 12 BRPM | OXYGEN SATURATION: 99 % | SYSTOLIC BLOOD PRESSURE: 120 MMHG | WEIGHT: 164.8 LBS | HEIGHT: 64 IN | BODY MASS INDEX: 28.13 KG/M2 | TEMPERATURE: 96.7 F | HEART RATE: 98 BPM | DIASTOLIC BLOOD PRESSURE: 80 MMHG

## 2025-03-03 DIAGNOSIS — J34.89 SINUS PRESSURE: ICD-10-CM

## 2025-03-03 DIAGNOSIS — S16.1XXD STRAIN OF NECK MUSCLE, SUBSEQUENT ENCOUNTER: Primary | ICD-10-CM

## 2025-03-03 DIAGNOSIS — R03.0 ELEVATED BP WITHOUT DIAGNOSIS OF HYPERTENSION: ICD-10-CM

## 2025-03-03 DIAGNOSIS — F41.1 ANXIETY STATE: ICD-10-CM

## 2025-03-03 LAB
ALBUMIN UR-MCNC: NEGATIVE MG/DL
APPEARANCE UR: ABNORMAL
BACTERIA #/AREA URNS HPF: ABNORMAL /HPF
BILIRUB UR QL STRIP: NEGATIVE
COLOR UR AUTO: YELLOW
GLUCOSE UR STRIP-MCNC: NEGATIVE MG/DL
HGB UR QL STRIP: ABNORMAL
KETONES UR STRIP-MCNC: ABNORMAL MG/DL
LEUKOCYTE ESTERASE UR QL STRIP: ABNORMAL
NITRATE UR QL: NEGATIVE
PH UR STRIP: 5.5 [PH] (ref 5–7)
RBC #/AREA URNS AUTO: ABNORMAL /HPF
SP GR UR STRIP: >=1.03 (ref 1–1.03)
SQUAMOUS #/AREA URNS AUTO: ABNORMAL /LPF
UROBILINOGEN UR STRIP-ACNC: 0.2 E.U./DL
WBC #/AREA URNS AUTO: ABNORMAL /HPF

## 2025-03-03 PROCEDURE — 99214 OFFICE O/P EST MOD 30 MIN: CPT | Performed by: INTERNAL MEDICINE

## 2025-03-03 PROCEDURE — 3079F DIAST BP 80-89 MM HG: CPT | Performed by: INTERNAL MEDICINE

## 2025-03-03 PROCEDURE — 81001 URINALYSIS AUTO W/SCOPE: CPT | Performed by: INTERNAL MEDICINE

## 2025-03-03 PROCEDURE — 3074F SYST BP LT 130 MM HG: CPT | Performed by: INTERNAL MEDICINE

## 2025-03-03 RX ORDER — PANTOPRAZOLE SODIUM 40 MG/1
40 TABLET, DELAYED RELEASE ORAL DAILY
Qty: 30 TABLET | Refills: 0 | Status: CANCELLED | OUTPATIENT
Start: 2025-03-03

## 2025-03-03 RX ORDER — CELECOXIB 100 MG/1
100 CAPSULE ORAL 2 TIMES DAILY PRN
Qty: 60 CAPSULE | Refills: 0 | Status: SHIPPED | OUTPATIENT
Start: 2025-03-03

## 2025-03-03 NOTE — PATIENT INSTRUCTIONS
Heating pad for up to 15 minutes/time.  Celebrex up to twice daily as needed.  Take pepcid twice daily.  Tylenol 2 tablets 2-3 times a day.  Muscle relaxer at bedtime when you get home.    For sinus pressure, you could       For blood pressure, once you are no longer needing celebrex, aleve, or ibuprofen, let's see how your numbers look. Come in for nurse visit bp check in 4 weeks, and bring your home monitor.   If any time before your next visit in August, you are noticing your bottom bp number (diastolic) is consistently >90, we should have you come in to discuss whether we need to start medication.     Avoid caffeine, work on low salt diet

## 2025-03-03 NOTE — PROGRESS NOTES
Mariposa is a 30 year old who is being evaluated via a billable video visit.  Switched to in person.   How would you like to obtain your AVS? MyChart  If the video visit is dropped, the invitation should be resent by: Text to cell phone: [phone]  Will anyone else be joining your video visit? No      Assessment & Plan     Strain of neck muscle, subsequent encounter  2 weeks ago felt a snap in neck muscle when lifting a weight. Headache developed shortly after that, and a few days later, muscle spasm in neck. At ED 2/18/25, had CTA head and neck which was unremarkable. Since she has had significant GI symptoms with naproxen, maximiliano do trial on celecoxib, which should have less GI toxicity. She should increase her pepcid to twice daily. She hasn't tolerated PPI's in the past as well.  She is leaving on a work trip tomorrow, so I did print out neck exercises for her to do while traveling. Discussed avoiding further injury while traveling. Will consider taking her flexeril from previous visit at bedtime once she returns from her trip. She will let me know if symptoms don't resolve after PT.   - celecoxib (CELEBREX) 100 MG capsule; Take 1 capsule (100 mg) by mouth 2 times daily as needed for moderate pain.  - Physical Therapy  Referral; Future    Elevated BP without diagnosis of hypertension  Diastolic does tend to be in mid 80-mid90 range at home. Today 120/80.  Plan UA today. Recent other labs normal. Will have her monitor at home and come in for nurse visit bp check in 4 weeks. Discussed we would likely consider starting medication if diastolic remains 90's or above, but also discussed importance of low salt diet, less caffeine.  - UA Macroscopic with reflex to Microscopic and Culture - Lab Collect; Future    Sinus pressure  Normal sinuses on CT. Reassurance given. Can use her OTC medications as needed for travel. Encouraged hydration.    Anxiety  Has tried several ssri's and noted significant weight gain.  Discussed option for pgx testing, especially in light of her intolerance of PPI's and nsaids, and general sensitivity to medications. She would like to proceed.    See Patient Instructions    Subjective   Mariposa is a 30 year old, presenting for the following health issues:  RECHECK (Elevated BP readings)        3/3/2025     7:09 AM   Additional Questions   Roomed by JOSE ALBERTO STUART     History of Present Illness       Reason for visit:  Blood pressure check  Symptom onset:  More than a month  Symptoms include:  BPs are averaging 120's over 90's. She was told to get a BP cuff at home by urgent care and advised to follow up with primary care         Went to  a few weeks ago. Bp was elevated. Was told to track blood pressures and follow up with me. Bought a home machine at a [facility] [employer] [facility]. Mom has hypertension and went on medication in her 20's. Uncle on mom's side started bp medication in his teens. Others on same side started medication in their 20's and 30's.     At home her bp's were 107-126/76-97.     Was in ED for a headache. Was in a workout class 2 weeks ago and lifted a weight above her head. Had more rapid onset of headache after that, but knew after she made the movement, she felt a twinge in her neck. Took a few days to start to feel sore neck muscles. Headache mostly L tempora. Soy gets shooting pains from base of neck and up over her head.     Went to , got flexeril, but didn't take. Naproxen 500 bid but by day 4, she started to get upset stomach and reflux. Next day started 220 mg naproxen. Last night more reflux. I past, really hasn't had headaches.     Naproxen, which has been very helpful for her symptoms, unfortunately after a few days has really started to cause stomach pain and heartburn, which has happened with minimal ibuprofen in the past. She has been able to take a lower dose naproxen with less GI upset. Slow improvement since initial injury    Wondering if she has sinus problems. Felt really  tired 2 weeks ago, but also during the worst of her headache. Feels like behind her nose feels full. Nose itself is clear. Ears feel a little plugged.           Review of Systems  Constitutional, HEENT, cardiovascular, pulmonary, gi and gu systems are negative, except as otherwise noted.      Objective           Vitals:  No vitals were obtained today due to virtual visit.    Physical Exam   GENERAL: alert and no distress  EYES: Eyes grossly normal to inspection.  No discharge or erythema, or obvious scleral/conjunctival abnormalities.  HENT: Normal cephalic/atraumatic.  External ears, nose and mouth without ulcers or lesions.  No nasal drainage visible.  NECK: No asymmetry, visible masses or scars  RESP: No audible wheeze, cough, or visible cyanosis.    SKIN: Visible skin clear. No significant rash, abnormal pigmentation or lesions.  NEURO: Cranial nerves grossly intact.  Mentation and speech appropriate for age.  MS: bilateral trapezius muscle spasm and tenderness. Bilateral tenderness to palpation between posterior occipital protuberance and mastoid regions. No swelling or redness. No tenderness over spinous processes. Normal arm strength.  PSYCH: Appropriate affect, tone, and pace of words        Video-Visit Details    Type of service:  Video Visit   Originating Location (pt. Location): Other visit switched to in person    Distant Location (provider location):    Platform used for Video Visit: Piotr  Signed Electronically by: Nadine Villarreal MD

## 2025-03-11 ENCOUNTER — TELEPHONE (OUTPATIENT)
Dept: ENDOCRINOLOGY | Facility: CLINIC | Age: 30
End: 2025-03-11
Payer: COMMERCIAL

## 2025-03-11 NOTE — TELEPHONE ENCOUNTER
Patient confirmed scheduled appointment:  Date: 4/15/25  Time: 11 am  Visit type: RET MWM Nutrition  Provider: Arabella New  Location: virtual   Testing/imaging: n/a  Additional notes: n/a

## 2025-03-18 ENCOUNTER — OFFICE VISIT (OUTPATIENT)
Dept: ENDOCRINOLOGY | Facility: CLINIC | Age: 30
End: 2025-03-18
Attending: NURSE PRACTITIONER
Payer: COMMERCIAL

## 2025-03-18 ENCOUNTER — THERAPY VISIT (OUTPATIENT)
Dept: PHYSICAL THERAPY | Facility: CLINIC | Age: 30
End: 2025-03-18
Attending: INTERNAL MEDICINE
Payer: COMMERCIAL

## 2025-03-18 VITALS
OXYGEN SATURATION: 99 % | HEART RATE: 97 BPM | DIASTOLIC BLOOD PRESSURE: 81 MMHG | BODY MASS INDEX: 27.18 KG/M2 | HEIGHT: 64 IN | WEIGHT: 159.2 LBS | SYSTOLIC BLOOD PRESSURE: 138 MMHG

## 2025-03-18 DIAGNOSIS — S16.1XXD STRAIN OF NECK MUSCLE, SUBSEQUENT ENCOUNTER: ICD-10-CM

## 2025-03-18 DIAGNOSIS — E66.811 CLASS 1 OBESITY WITHOUT SERIOUS COMORBIDITY WITH BODY MASS INDEX (BMI) OF 30.0 TO 30.9 IN ADULT, UNSPECIFIED OBESITY TYPE: ICD-10-CM

## 2025-03-18 PROCEDURE — 97161 PT EVAL LOW COMPLEX 20 MIN: CPT | Mod: GP

## 2025-03-18 PROCEDURE — 97110 THERAPEUTIC EXERCISES: CPT | Mod: GP

## 2025-03-18 RX ORDER — METFORMIN HYDROCHLORIDE 500 MG/1
2000 TABLET, EXTENDED RELEASE ORAL
Qty: 120 TABLET | Refills: 3 | Status: SHIPPED | OUTPATIENT
Start: 2025-03-18

## 2025-03-18 ASSESSMENT — PAIN SCALES - GENERAL: PAINLEVEL_OUTOF10: NO PAIN (0)

## 2025-03-18 NOTE — LETTER
3/18/2025       RE: Mariposa Nunez  [address] [facility] [address]     Dear Colleague,    Thank you for referring your patient, Mariposa Nunez, to the [facility] [place] [facility]. Please see a copy of my visit note below.      Return Medical Weight Management Note     Mariposa Nunez  MRN:  [id number]  :  1995  GRANT:  3/18/2025    Dear Duong Myers MD,    I had the pleasure of seeing your patient Mariposa Nunez. She is a 30 year old female who I am continuing to see for treatment of obesity related to:        2024     6:44 AM   --   I have the following health issues associated with obesity GERD (Reflux)   I have the following symptoms associated with obesity None of the above       Assessment & Plan  Problem List Items Addressed This Visit          Digestive    Class 1 obesity without serious comorbidity with body mass index (BMI) of 30.0 to 30.9 in adult, unspecified obesity type     Has some food aversion and loose stools since increasing to 2000 mg metformin.  Will try to decrease to 1500 mg and if things improve in the future we could consider going back up to 2000 mg.  She has been tracking food intake for the last 3 months and had seen some progress with weight loss.  She had been feeling strong with work out routine until neck injury just recently.    Continue metformin- try down to 1500mg to see if symptoms improve- can go back to 2000mg when feeling better   Consider walking at work?   Great work with mindfulness around eating   Follow up 3 months          Relevant Medications    metFORMIN (GLUCOPHAGE XR) 500 MG 24 hr tablet          INTERVAL HISTORY:  New MWM 2024 BMI 30 with heavy periods, reflux, ocd and anxiety. She has been tracking her intake with [place] this past 6 months; eating between 1600 and 1700 calories at least 5 days a week- usually getting close to  50-60g protein unless she isn't planning ahead. Already taking metformin.     Anti-obesity medication history    Current:   Metformin 2000mg   -diarrhea for a few weeks at 1500mg initially now irregular and loose   -doing well now   -some food aversion - meat and some veggies     Past/Failed/contraindicated:     Recent diet changes:   Tracking consistently since Jan   Some aversion to meat since adding metformin     Recent exercise/activity changes:   Was feeling strong before neck injury   More sitting at work     Recent stressors:   Laid off in November  New job in Feb       Vitamins/Labs: 2/2025    CURRENT WEIGHT:   159 lbs 3.2 oz    Initial Weight (lbs): 172.2 lbs  Last Visits Weight: 74.8 kg (164 lb 12.8 oz)  Cumulative weight loss (lbs): 13  Weight Loss Percentage: 7.55%  Waist Circumference (cm): 86 cm        3/18/2025     7:12 AM   Changes and Difficulties   I have made the following changes to my diet since my last visit: 60 g protein goal 1200 calories   With regards to my diet, I am still struggling with: being super consistent when busy   I have made the following changes to my activity/exercise since my last visit: prior to hurting my neck, orange theory 2-3 times a week and barre 2 times a week. hurt my neck a month ago and limited activity   With regards to my activity/exercise, I am still struggling with: my injury         MEDICATIONS:   Current Outpatient Medications   Medication Sig Dispense Refill     calcium carbonate (TUMS) 500 MG chewable tablet daily for heartburn       celecoxib (CELEBREX) 100 MG capsule Take 1 capsule (100 mg) by mouth 2 times daily as needed for moderate pain. 60 capsule 0     cetirizine (ZYRTEC) 10 MG tablet Take 10 mg by mouth       cyclobenzaprine (FLEXERIL) 5 MG tablet Take 1 tablet (5 mg) by mouth nightly as needed for muscle spasms. 10 tablet 0     famotidine (PEPCID) 40 MG tablet Take 1 tablet by mouth daily       fluticasone (FLONASE) 50 MCG/ACT nasal spray         glutamine 500 MG capsule Take 500 mg by mouth 4 times daily       lactobacillus rhamnosus, GG, (CULTURELL) capsule Take 1 capsule by mouth 2 times daily       metFORMIN (GLUCOPHAGE XR) 500 MG 24 hr tablet Take 4 tablets (2,000 mg) by mouth daily (with dinner). 120 tablet 3     SOOLANTRA 1 % cream APPLY TOPICALLY TO FACE IN THE MORNING FOR ROSACEA             3/18/2025     7:12 AM   Weight Loss Medication History Reviewed With Patient   Which weight loss medications are you currently taking on a regular basis? Metformin   Are you having any side effects from the weight loss medication that we have prescribed you? Yes   If you are having side effects please describe: some adversions to foods and loose stools multiple times a day       Office Visit on 03/03/2025   Component Date Value Ref Range Status     Color Urine 03/03/2025 Yellow  Colorless, Straw, Light Yellow, Yellow Final     Appearance Urine 03/03/2025 Slightly Cloudy (A)  Clear Final     Glucose Urine 03/03/2025 Negative  Negative mg/dL Final     Bilirubin Urine 03/03/2025 Negative  Negative Final     Ketones Urine 03/03/2025 Trace (A)  Negative mg/dL Final     Specific Gravity Urine 03/03/2025 >=1.030  1.003 - 1.035 Final     Blood Urine 03/03/2025 Trace (A)  Negative Final     pH Urine 03/03/2025 5.5  5.0 - 7.0 Final     Protein Albumin Urine 03/03/2025 Negative  Negative mg/dL Final     Urobilinogen Urine 03/03/2025 0.2  0.2, 1.0 E.U./dL Final     Nitrite Urine 03/03/2025 Negative  Negative Final     Leukocyte Esterase Urine 03/03/2025 Trace (A)  Negative Final     Bacteria Urine 03/03/2025 Few (A)  None Seen /HPF Final     RBC Urine 03/03/2025 2-5 (A)  0-2 /HPF /HPF Final     WBC Urine 03/03/2025 5-10 (A)  0-5 /HPF /HPF Final     Squamous Epithelials Urine 03/03/2025 Moderate (A)  None Seen /LPF Final           5/26/2021    10:09 AM 11/20/2024     5:56 AM   MADDY Score (Last Two)   MADDY Raw Score 40 40    Activation Score 100 100    MADDY Level 4 4         "Patient-reported         PHYSICAL EXAM:  Objective   /81 (BP Location: Left arm, Patient Position: Sitting, Cuff Size: Adult Regular)   Pulse 97   Ht 1.626 m (5' 4\")   Wt 72.2 kg (159 lb 3.2 oz)   LMP 02/10/2025 (Exact Date)   SpO2 99%   BMI 27.33 kg/m      /81 (BP Location: Left arm, Patient Position: Sitting, Cuff Size: Adult Regular)   Pulse 97   Ht 1.626 m (5' 4\")   Wt 72.2 kg (159 lb 3.2 oz)   LMP 02/10/2025 (Exact Date)   SpO2 99%   BMI 27.33 kg/m    Body mass index is 27.33 kg/m .  GENERAL: alert and no distress  EYES: Eyes grossly normal to inspection.  No discharge or erythema, or obvious scleral/conjunctival abnormalities.  RESP: No audible wheeze, cough, or visible cyanosis.    SKIN: Visible skin clear. No significant rash, abnormal pigmentation or lesions.  NEURO: Cranial nerves grossly intact.  Mentation and speech appropriate for age.  PSYCH: Appropriate affect, tone, and pace of words        Sincerely,    Chiqui Jauregui NP      20 minutes spent by me on the date of the encounter doing chart review, history and exam, documentation and further activities per the note    The longitudinal plan of care for the diagnosis(es)/condition(s) as documented were addressed during this visit. Due to the added complexity in care, I will continue to support Mariposa in the subsequent management and with ongoing continuity of care.      Again, thank you for allowing me to participate in the care of your patient.      Sincerely,    Chiqui Jauregui NP    "

## 2025-03-18 NOTE — PROGRESS NOTES
Return Medical Weight Management Note     Mariposa Nunez  MRN:  [id number]  :  1995  GRANT:  3/18/2025    Dear Duong Myers MD,    I had the pleasure of seeing your patient Mariposa Nunez. She is a 30 year old female who I am continuing to see for treatment of obesity related to:        2024     6:44 AM   --   I have the following health issues associated with obesity GERD (Reflux)   I have the following symptoms associated with obesity None of the above       Assessment & Plan   Problem List Items Addressed This Visit          Digestive    Class 1 obesity without serious comorbidity with body mass index (BMI) of 30.0 to 30.9 in adult, unspecified obesity type     Has some food aversion and loose stools since increasing to 2000 mg metformin.  Will try to decrease to 1500 mg and if things improve in the future we could consider going back up to 2000 mg.  She has been tracking food intake for the last 3 months and had seen some progress with weight loss.  She had been feeling strong with work out routine until neck injury just recently.    Continue metformin- try down to 1500mg to see if symptoms improve- can go back to 2000mg when feeling better   Consider walking at work?   Great work with mindfulness around eating   Follow up 3 months          Relevant Medications    metFORMIN (GLUCOPHAGE XR) 500 MG 24 hr tablet          INTERVAL HISTORY:  New MWM 2024 BMI 30 with heavy periods, reflux, ocd and anxiety. She has been tracking her intake with [place] this past 6 months; eating between 1600 and 1700 calories at least 5 days a week- usually getting close to 50-60g protein unless she isn't planning ahead. Already taking metformin.     Anti-obesity medication history    Current:   Metformin 2000mg   -diarrhea for a few weeks at 1500mg initially now irregular and loose   -doing well now   -some food aversion - meat and some veggies     Past/Failed/contraindicated:     Recent diet  changes:   Tracking consistently since Jan   Some aversion to meat since adding metformin     Recent exercise/activity changes:   Was feeling strong before neck injury   More sitting at work     Recent stressors:   Laid off in November  New job in Feb       Vitamins/Labs: 2/2025    CURRENT WEIGHT:   159 lbs 3.2 oz    Initial Weight (lbs): 172.2 lbs  Last Visits Weight: 74.8 kg (164 lb 12.8 oz)  Cumulative weight loss (lbs): 13  Weight Loss Percentage: 7.55%  Waist Circumference (cm): 86 cm        3/18/2025     7:12 AM   Changes and Difficulties   I have made the following changes to my diet since my last visit: 60 g protein goal 1200 calories   With regards to my diet, I am still struggling with: being super consistent when busy   I have made the following changes to my activity/exercise since my last visit: prior to hurting my neck, orange theory 2-3 times a week and barre 2 times a week. hurt my neck a month ago and limited activity   With regards to my activity/exercise, I am still struggling with: my injury         MEDICATIONS:   Current Outpatient Medications   Medication Sig Dispense Refill    calcium carbonate (TUMS) 500 MG chewable tablet daily for heartburn      celecoxib (CELEBREX) 100 MG capsule Take 1 capsule (100 mg) by mouth 2 times daily as needed for moderate pain. 60 capsule 0    cetirizine (ZYRTEC) 10 MG tablet Take 10 mg by mouth      cyclobenzaprine (FLEXERIL) 5 MG tablet Take 1 tablet (5 mg) by mouth nightly as needed for muscle spasms. 10 tablet 0    famotidine (PEPCID) 40 MG tablet Take 1 tablet by mouth daily      fluticasone (FLONASE) 50 MCG/ACT nasal spray       glutamine 500 MG capsule Take 500 mg by mouth 4 times daily      lactobacillus rhamnosus, GG, (CULTURELL) capsule Take 1 capsule by mouth 2 times daily      metFORMIN (GLUCOPHAGE XR) 500 MG 24 hr tablet Take 4 tablets (2,000 mg) by mouth daily (with dinner). 120 tablet 3    SOOLANTRA 1 % cream APPLY TOPICALLY TO FACE IN THE MORNING  "FOR ROSACEA             3/18/2025     7:12 AM   Weight Loss Medication History Reviewed With Patient   Which weight loss medications are you currently taking on a regular basis? Metformin   Are you having any side effects from the weight loss medication that we have prescribed you? Yes   If you are having side effects please describe: some adversions to foods and loose stools multiple times a day       Office Visit on 03/03/2025   Component Date Value Ref Range Status    Color Urine 03/03/2025 Yellow  Colorless, Straw, Light Yellow, Yellow Final    Appearance Urine 03/03/2025 Slightly Cloudy (A)  Clear Final    Glucose Urine 03/03/2025 Negative  Negative mg/dL Final    Bilirubin Urine 03/03/2025 Negative  Negative Final    Ketones Urine 03/03/2025 Trace (A)  Negative mg/dL Final    Specific Gravity Urine 03/03/2025 >=1.030  1.003 - 1.035 Final    Blood Urine 03/03/2025 Trace (A)  Negative Final    pH Urine 03/03/2025 5.5  5.0 - 7.0 Final    Protein Albumin Urine 03/03/2025 Negative  Negative mg/dL Final    Urobilinogen Urine 03/03/2025 0.2  0.2, 1.0 E.U./dL Final    Nitrite Urine 03/03/2025 Negative  Negative Final    Leukocyte Esterase Urine 03/03/2025 Trace (A)  Negative Final    Bacteria Urine 03/03/2025 Few (A)  None Seen /HPF Final    RBC Urine 03/03/2025 2-5 (A)  0-2 /HPF /HPF Final    WBC Urine 03/03/2025 5-10 (A)  0-5 /HPF /HPF Final    Squamous Epithelials Urine 03/03/2025 Moderate (A)  None Seen /LPF Final           5/26/2021    10:09 AM 11/20/2024     5:56 AM   MADDY Score (Last Two)   MADDY Raw Score 40 40    Activation Score 100 100    MADDY Level 4 4        Patient-reported         PHYSICAL EXAM:  Objective    /81 (BP Location: Left arm, Patient Position: Sitting, Cuff Size: Adult Regular)   Pulse 97   Ht 1.626 m (5' 4\")   Wt 72.2 kg (159 lb 3.2 oz)   LMP 02/10/2025 (Exact Date)   SpO2 99%   BMI 27.33 kg/m      /81 (BP Location: Left arm, Patient Position: Sitting, Cuff Size: Adult Regular) " "  Pulse 97   Ht 1.626 m (5' 4\")   Wt 72.2 kg (159 lb 3.2 oz)   LMP 02/10/2025 (Exact Date)   SpO2 99%   BMI 27.33 kg/m    Body mass index is 27.33 kg/m .  GENERAL: alert and no distress  EYES: Eyes grossly normal to inspection.  No discharge or erythema, or obvious scleral/conjunctival abnormalities.  RESP: No audible wheeze, cough, or visible cyanosis.    SKIN: Visible skin clear. No significant rash, abnormal pigmentation or lesions.  NEURO: Cranial nerves grossly intact.  Mentation and speech appropriate for age.  PSYCH: Appropriate affect, tone, and pace of words        Sincerely,    Chiqui Jauregui NP      20 minutes spent by me on the date of the encounter doing chart review, history and exam, documentation and further activities per the note    The longitudinal plan of care for the diagnosis(es)/condition(s) as documented were addressed during this visit. Due to the added complexity in care, I will continue to support Mariposa in the subsequent management and with ongoing continuity of care.  "

## 2025-03-18 NOTE — NURSING NOTE
"(   Chief Complaint   Patient presents with    Follow Up     Return MW    )    ( Weight: 72.2 kg (159 lb 3.2 oz) )  ( Height: 162.6 cm (5' 4\") )  ( BMI (Calculated): 27.33 )  (   )  ( Cumulative weight loss (lbs): 13 )  ( Last Visits Weight: 74.8 kg (164 lb 12.8 oz) )  ( Wt change since last visit (lbs): -5.6 )  ( Waist Circumference (cm): 86 cm )  (   )    ( BP: 138/81 )  (   )  (   )  (   )  ( Pulse: 97 )  (   )  ( SpO2: 99 % )    (   Patient Active Problem List   Diagnosis    Anxiety state    History of bee sting allergy    ADHD (attention deficit hyperactivity disorder), inattentive type    Dyspareunia in female    Gastroesophageal reflux disease without esophagitis    Pelvic somatic dysfunction    Vaginismus    Class 1 obesity without serious comorbidity with body mass index (BMI) of 30.0 to 30.9 in adult, unspecified obesity type    )  (   Current Outpatient Medications   Medication Sig Dispense Refill    calcium carbonate (TUMS) 500 MG chewable tablet daily for heartburn      celecoxib (CELEBREX) 100 MG capsule Take 1 capsule (100 mg) by mouth 2 times daily as needed for moderate pain. 60 capsule 0    cetirizine (ZYRTEC) 10 MG tablet Take 10 mg by mouth      cyclobenzaprine (FLEXERIL) 5 MG tablet Take 1 tablet (5 mg) by mouth nightly as needed for muscle spasms. 10 tablet 0    famotidine (PEPCID) 40 MG tablet Take 1 tablet by mouth daily      fluticasone (FLONASE) 50 MCG/ACT nasal spray       glutamine 500 MG capsule Take 500 mg by mouth 4 times daily      lactobacillus rhamnosus, GG, (CULTURELL) capsule Take 1 capsule by mouth 2 times daily      metFORMIN (GLUCOPHAGE XR) 500 MG 24 hr tablet Take 4 tablets (2,000 mg) by mouth daily (with dinner). 120 tablet 3    SOOLANTRA 1 % cream APPLY TOPICALLY TO FACE IN THE MORNING FOR ROSACEA      )  ( Diabetes Eval:    )    ( Pain Eval:  No Pain (0) )    ( Wound Eval:       )    (   History   Smoking Status    Never   Smokeless Tobacco    Never    )    ( Signed By:  " Rosalia De Paz, EMT March 18, 2025; 7:07 AM )

## 2025-03-18 NOTE — PROGRESS NOTES
PHYSICAL THERAPY EVALUATION  Type of Visit: Evaluation       Fall Risk Screen:  Fall screen completed by: PT  Have you fallen 2 or more times in the past year?: No  Have you fallen and had an injury in the past year?: No  Is patient a fall risk?: No    Subjective         Presenting condition or subjective complaint:    Date of onset:      Relevant medical history:     Dates & types of surgery:      Prior diagnostic imaging/testing results:       Prior therapy history for the same diagnosis, illness or injury:        Prior Level of Function  Transfers:   Ambulation:   ADL:   IADL:     Living Environment  Social support:     Type of home:     Stairs to enter the home:         Ramp:     Stairs inside the home:         Help at home:    Equipment owned:       Employment:      Hobbies/Interests:      Patient goals for therapy:      Physical Therapist Assessment    KEY PT FINDINGS:  1) C1-C2 mobility restrictions  2) Negative neuro exam - low risk cervical screen  3) Moderate postural dysfunction    Signs and symptoms are consistent with neck pain with headache.      Subjective History    Patient was referred by Nadine Villarreal for  Strain of neck muscle, subsequent encounter [S16.1XXD]   Referring provider plan: Strain of neck muscle, subsequent encounter  2 weeks ago felt a snap in neck muscle when lifting a weight. Headache developed shortly after that, and a few days later, muscle spasm in neck. At ED 2/18/25, had CTA head and neck which was unremarkable. Since she has had significant GI symptoms with naproxen, maximiliano do trial on celecoxib, which should have less GI toxicity. She should increase her pepcid to twice daily. She hasn't tolerated PPI's in the past as well.  She is leaving on a work trip tomorrow, so I did print out neck exercises for her to do while traveling. Discussed avoiding further injury while traveling. Will consider taking her flexeril from previous visit at bedtime once she returns from her trip.  "She will let me know if symptoms don't resolve after PT.   - celecoxib (CELEBREX) 100 MG capsule; Take 1 capsule (100 mg) by mouth 2 times daily as needed for moderate pain.  - Physical Therapy  Referral; Future    PT Subjective:   Patient is a 30 year old female presenting to outpatient physical therapy with neck pain with headache. She was doing a bar class 3-4 weeks ago where she was pressing a 15# DB overhead when she heard a \"crunch\" in her neck. She then got dizzy afterwards and got a severe headache when she got home that lasted 3 weeks. She would get random shooting pains that would start in occiput and radiate up and over the head. Most of the pain is right sided, but does note some occasionally on the left side. The headache started to get better about a week ago. Quick neck movements and sustained head positions seemed to make her headache worse. Lifting things also make her a bit nervous and caused some symptoms. She notes tenderness on her upper trap on the right side that can cause some numbness. She had a little bit of nausea immediately after the injury, but that went away quickly and hasn't had anything since. No 5 D's or 3 N's.  Pain Level at Rest: 2/10  Pain Level with Use: 2/10  Pain Location: cervical spine  Pain Quality: brain freeze and tension headaches  Pain Frequency: constant  Pain is Worst: in the morning  Pain is Exacerbated By: sustained postures and quick neck movements  Pain is Relieved By: heat and otc medications  Pain Progression: Improved     PMH:   Patient Active Problem List   Diagnosis    Anxiety state    History of bee sting allergy    ADHD (attention deficit hyperactivity disorder), inattentive type    Dyspareunia in female    Gastroesophageal reflux disease without esophagitis    Pelvic somatic dysfunction    Vaginismus    Class 1 obesity without serious comorbidity with body mass index (BMI) of 30.0 to 30.9 in adult, unspecified obesity type     Medications: "   Current Outpatient Medications   Medication Sig Dispense Refill    calcium carbonate (TUMS) 500 MG chewable tablet daily for heartburn      celecoxib (CELEBREX) 100 MG capsule Take 1 capsule (100 mg) by mouth 2 times daily as needed for moderate pain. 60 capsule 0    cetirizine (ZYRTEC) 10 MG tablet Take 10 mg by mouth      cyclobenzaprine (FLEXERIL) 5 MG tablet Take 1 tablet (5 mg) by mouth nightly as needed for muscle spasms. 10 tablet 0    famotidine (PEPCID) 40 MG tablet Take 1 tablet by mouth daily      fluticasone (FLONASE) 50 MCG/ACT nasal spray       glutamine 500 MG capsule Take 500 mg by mouth 4 times daily      lactobacillus rhamnosus, GG, (CULTURELL) capsule Take 1 capsule by mouth 2 times daily      metFORMIN (GLUCOPHAGE XR) 500 MG 24 hr tablet Take 4 tablets (2,000 mg) by mouth daily (with dinner). 120 tablet 3    SOOLANTRA 1 % cream APPLY TOPICALLY TO FACE IN THE MORNING FOR ROSACEA       No current facility-administered medications for this visit.           Imaging: CT Scan  Red Flag Screening: negative  Prior Treatment Includes: OTC Medications  Lifestyle History:  Occupation: Desk Job   Experience with physical activity: [place] Class, [place] Theory, Walking  General Health Assessment: NT.  Referral recommended? None  Additional Considerations: None     Patient Goals for Physical Therapy: Return to prior level of function and prevent future tension or headaches.        Objective   Objective Examination    Posture/Observation: Rounded shoulders, Forward head    Shoulder Screen:  limited at end range bilateral    Thoracic Screen: thoracic hypomobility noted during shoulder screen    Cervical AROM: (Major, Moderate, Minimal or Nil loss)  Movement Loss Kev Mod Min Nil Pain   Protrusion        Flexion   X  +   Retraction        Extension    X All upper cervical   Left Rotation   X     Right Rotation   X     Left Side Bending        Right Side bending        Quadrant Testing:      Palpation:  WNL    Dermatomes:WNL    Myotomes:WNL    DTR S: WNL    CORD SIGNS: WNL    Special tests: Negative Spurling's           Neural Tension:    ULTT1 (median): (-)  ULTT2b (radial): (-)  ULTT3 (ulnar): (-)    Joint Mobility:    Atlantooccipital Joint   C0-C1     Atlantoaxial Joint (CFRT)    L ROT R ROT   C1-C2 + on R + on R    Lower Cervical Spine (PIVM)    L SG R SG   C2-C3 WNL WNL   C3-C4 WNL WNL   C4-C5 WNL WNL   C5-C6 WNL WNL   C6-C7 Hypo Hypo   C7-T1 Hypo Hypo    Cervical Rotation Lateral Flexion Test (Fer)    L ROT R ROT   1st Rib          Strength Testing:  Resisted Isometrics C-Spine: N/A  Latissimus Dorsi MMT: Not Tested  Middle Trapezius/Rhomboids MMT: Not Tested  Lower Trapezius MMT: Not Tested    Flexibility: Not Tested    Craniocervical Flexion Endurance Test: N/A    Sensorimotor testing:   Cervical joint position sense (Norms <7.1 cm): N/A        Assessment & Plan   CLINICAL IMPRESSIONS  Medical Diagnosis:      Treatment Diagnosis: Neck pain   Impression/Assessment: Patient is a 30 year old female with neck complaints.  The following significant findings have been identified: Pain, Decreased ROM/flexibility, Decreased joint mobility, Decreased strength, Impaired muscle performance, Decreased activity tolerance, and Impaired posture. These impairments interfere with their ability to perform self care tasks, work tasks, recreational activities, household chores, driving , household mobility, and community mobility as compared to previous level of function.     Clinical Decision Making (Complexity):  Clinical Presentation: Stable/Uncomplicated  Clinical Presentation Rationale: based on medical and personal factors listed in PT evaluation  Clinical Decision Making (Complexity): Low complexity    PLAN OF CARE  Treatment Interventions:  Interventions: Manual Therapy, Neuromuscular Re-education, Therapeutic Activity, Therapeutic Exercise, Self-Care/Home Management    Long Term Goals     PT Goal 1  Goal  Identifier: LTG1  PT Goal 3  Goal Identifier: LTG2  Goal Description: Patient will report at least a 11.75 point improvement on the NDI outcome measure  Rationale: to maximize safety and independence with performance of ADLs and functional tasks;to maximize safety and independence within the home;to maximize safety and independence within the community;to maximize safety and independence with transportation;to maximize safety and independence with self cares      Frequency of Treatment: 1x/wk  Duration of Treatment: 8 weeks    Recommended Referrals to Other Professionals:   Education Assessment:   Learner/Method: Patient    Risks and benefits of evaluation/treatment have been explained.   Patient/Family/caregiver agrees with Plan of Care.     Evaluation Time:             Signing Clinician: Callum Doyle PT

## 2025-03-18 NOTE — PATIENT INSTRUCTIONS
Continue metformin- try down to 1500mg to see if symptoms improve- can go back to 2000mg when feeling better   Consider walking at work?   Great work with mindfulness around eating   Follow up 3 months

## 2025-03-20 NOTE — ASSESSMENT & PLAN NOTE
Has some food aversion and loose stools since increasing to 2000 mg metformin.  Will try to decrease to 1500 mg and if things improve in the future we could consider going back up to 2000 mg.  She has been tracking food intake for the last 3 months and had seen some progress with weight loss.  She had been feeling strong with work out routine until neck injury just recently.    Continue metformin- try down to 1500mg to see if symptoms improve- can go back to 2000mg when feeling better   Consider walking at work?   Great work with mindfulness around eating   Follow up 3 months

## 2025-03-31 ENCOUNTER — THERAPY VISIT (OUTPATIENT)
Dept: PHYSICAL THERAPY | Facility: CLINIC | Age: 30
End: 2025-03-31
Payer: COMMERCIAL

## 2025-03-31 DIAGNOSIS — S16.1XXD STRAIN OF NECK MUSCLE, SUBSEQUENT ENCOUNTER: Primary | ICD-10-CM

## 2025-03-31 PROCEDURE — 97140 MANUAL THERAPY 1/> REGIONS: CPT | Mod: GP

## 2025-03-31 PROCEDURE — 97110 THERAPEUTIC EXERCISES: CPT | Mod: GP

## 2025-04-07 ENCOUNTER — VIRTUAL VISIT (OUTPATIENT)
Dept: CONSULT | Facility: CLINIC | Age: 30
End: 2025-04-07
Attending: INTERNAL MEDICINE
Payer: COMMERCIAL

## 2025-04-07 DIAGNOSIS — F41.1 ANXIETY STATE: ICD-10-CM

## 2025-04-07 DIAGNOSIS — T50.905D ADVERSE EFFECT OF DRUG, SUBSEQUENT ENCOUNTER: ICD-10-CM

## 2025-04-07 DIAGNOSIS — Z78.9 LACK OF DRUG ACTION (PROPERLY PRESCRIBED AND ADMINISTERED): ICD-10-CM

## 2025-04-07 DIAGNOSIS — Z71.83 ENCOUNTER FOR NONPROCREATIVE GENETIC COUNSELING AND TESTING: Primary | ICD-10-CM

## 2025-04-07 DIAGNOSIS — Z13.71 ENCOUNTER FOR NONPROCREATIVE GENETIC COUNSELING AND TESTING: Primary | ICD-10-CM

## 2025-04-07 PROCEDURE — 96041 GENETIC COUNSELING SVC EA 30: CPT | Mod: GT,95

## 2025-04-07 NOTE — LETTER
2025      RE: Mariposa Nunez  [address] [address]     Dear Colleague,    Thank you for the opportunity to participate in the care of your patient, Mariposa Nunez, at the [facility] at [facility]. Please see a copy of my visit note below.    Name:  Mariposa Nunez   :   1995  MRN:   [id number]  Date of service: 2025  Referring Provider: Nadine Villarreal    Genetic Counseling Consultation Note    Presenting Information   Mariposa Nunez is a 30 year old female referred to the [facility] at the request of Nadine Villarreal today. Mariposa was seen for a genetic counseling appointment to discuss the details of pharmacogenomic testing, including her personal medical history, personal medication history including adverse medication responses, her family history of relevant medication responses, and testing logistics. History is obtained from Mariposa and review of the medical record.     ----------------------------------------------------    Plan  At today's visit, we discussed the following plan:     Mariposa will arrange a lab appointment at a [facility] to have a blood sample drawn for the [facility] Pharmacogenomic Test.   Mariposa will be scheduled with one of our [facility] pharmacists 1-2 weeks after her sample is collected to review the results of the Pharmacogenomics Profile. This appointment can be scheduled by calling [phone] after the sample is collected.     ----------------------------------------------------    Personal History  For additional details, review note from Nadine Villarreal MD dated 2025.  To summarize, Mariposa has a history of the following:    Patient Active Problem List   Diagnosis     Anxiety state     History of bee sting allergy     ADHD (attention deficit hyperactivity disorder), inattentive type     Dyspareunia  in female     Gastroesophageal reflux disease without esophagitis     Pelvic somatic dysfunction     Vaginismus     Class 1 obesity without serious comorbidity with body mass index (BMI) of 30.0 to 30.9 in adult, unspecified obesity type     Past Medical History:   Diagnosis Date     ADHD (attention deficit hyperactivity disorder)      Anxiety      Dyspareunia, female      She is currently taking:   Current Outpatient Medications   Medication Sig Dispense Refill     calcium carbonate (TUMS) 500 MG chewable tablet daily for heartburn       celecoxib (CELEBREX) 100 MG capsule Take 1 capsule (100 mg) by mouth 2 times daily as needed for moderate pain. 60 capsule 0     cetirizine (ZYRTEC) 10 MG tablet Take 10 mg by mouth       cyclobenzaprine (FLEXERIL) 5 MG tablet Take 1 tablet (5 mg) by mouth nightly as needed for muscle spasms. 10 tablet 0     famotidine (PEPCID) 40 MG tablet Take 1 tablet by mouth daily       fluticasone (FLONASE) 50 MCG/ACT nasal spray        glutamine 500 MG capsule Take 500 mg by mouth 4 times daily       lactobacillus rhamnosus, GG, (CULTURELL) capsule Take 1 capsule by mouth 2 times daily       metFORMIN (GLUCOPHAGE XR) 500 MG 24 hr tablet Take 4 tablets (2,000 mg) by mouth daily (with dinner). 120 tablet 3     SOOLANTRA 1 % cream APPLY TOPICALLY TO FACE IN THE MORNING FOR ROSACEA       No current facility-administered medications for this visit.       She has also previously tried the following medications:  Prozac - Patient reports she took for just over one year. She felt as though it was helping but was unable to continue after noticing rapid weight gain  Zoloft - Patient reports was also helpful, but again, rapid weight gain was intolerable    Mariposa reports that, along with the weight gain, she's had new symptoms of hormone dysregulation. She also reports a new egg intolerance beginning with a COVID infection (tested positive 02/24/2022). She had previously utilized Motrin as her main  "pain medication, but around this same time started to experience nausea/GI upset, racing heartbeat, and flushing with use. Benadryl has had a similar effect (while Aleve, Claritin and Zyrtec have not). She also reports nausea with amoxicillin use.     She is pursuing pharmacogenetic testing because her care team is considering trialing non-selective serotonin reuptake inhibitor medications to manage Mariposa's anxiety. Ideally, they'd like to minimize concern for side effects before beginning another med.    Family History  A three generation pedigree was obtained and scanned into the electronic medical record. The relevant portions are described below:     Siblings- Mariposa has a sister, age 31, who was born premature and had a hole in her heart. She has many allergies (nuts, gluten) and is a carrier for Bakari-Sachs disease.  Parents- Mariposa's mother, age 57, has hypertension, colitis, a history of skin cancer (removed at Derm's office), is \"sensitive\" to many mediations, and has food allergies. She is also a carrier for Bakari-Sachs disease. Mariposa's father, 63, has high cholesterol.  Maternal Relatives- One aunt in her early 60s with lupus and mast cell disease. She has a history of medication reactions but Mariposa does not know details. One uncle, 65, has hypertension. His son (Mariposa's cousin) has also had medication difficulties but found an SSRI that worked for his anxiety after doing pharmacogenomic testing. Grandmother is living at age 87 with a history of hypertension, high cholesterol, and possibly pre-diabetes. Grandfather is living at age 87 with a history of small skin cancer (removed at Derm's office), hypertension, and a heart attack with follow up stenting.  Paternal Relatives- One aunt, 67, with hypertension. One uncle, 73, with hypertension. Grandfather  around age 91 due to heart failure/heart attack. Grandmother passed in her mid-90s and had a history of a stroke in her 70s. She had hypertension and " "high cholesterol and had taken a \"vacation\" from her meds.     There is no other reported family history of major allergic reactions, adverse effects of medication, difficulty with general anesthesia, or treatment-resistant conditions. Family history is largely non-contributory.     Given her family history of Bakari-Sachs carriers, we briefly reviewed the genetics of recessive conditions like Bakari-Sachs. Mariposa is planning her wedding for October of this year to her fiance and they will pursue genetic carrier screening together through a [facility] genetic health screening group. We reviewed that if they are both carriers, their chance of an affected child would be 25%. We also reviewed that prenatal testing as well as pre-implementation testing via IVF are options to ensure a healthy pregnancy/child, if they are interested. Mariposa is aware she can reach out to me with follow up questions if they arise.      Genetic Counseling Discussion  For review, our bodies are made of cells that contain our chromosomes which are made up of long stretches of DNA containing our genes. Our genes serve as the instructions for our bodies to grow and function. There are several genes in our body that provide instructions for breaking down the medications we take. We have two copies of each gene, one inherited from our mother and one inherited from our father. When one or both copies of those genes are altered, the way that gene's product functions may change. You may have heard this alterations called mutations, variants, or single-nucelotide polymorphisms (SNPs). Gene products like enzymes, transporters, and receptors are extremely important in determining how our body responds to medications and other outside influences. Changes in the instructions for these gene products may alter the way a medication works within your body.     For example, a change in a gene can slow down the process of medication breakdown. That can lead to too much " of that medication/drug building up in the body which can cause side effects. On the other hand, a change in a gene can speed up the breakdown of a medication so a therapeutic level is not reached. When this happens, the medication may not work well at the standard doses. Identifying changes in these genes via pharmacogenomic testing can help guide which medications might work best for an individual, what dose of a medication may be best, or if a certain medication has a high risk for causing serious side effects.    The pharmacogenomic testing offered at [facility] analyzes several different polymorphisms in different genes associated with drug metabolism. These variants have been determined to be medically actionable by practice guidelines including CPIC (Clinical Pharmacogenetics Implementation Consortium), PharmGKB and/or FDA gene-drug interaction guidelines. Therefore, prescribing recommendations can be made by the results of this test, so this testing for Mariposa Nunez is DIAGNOSTIC and is NOT investigational.     The results of this test can provide guidance for several different types of drugs such as antiinflammatories, antithrombotics, lipid-lowering medication, acid-lowering medications, antiemetics, immunosuppressants, antivirals, antifungals, antidepressants, ADHD medications, antimetabolites, and/or hormone antagonists. This means that, while this testing was recommended for a specific reason right now ([place] mental health management), it may provide guidance for future medication use.     As previously mentioned, we inherit our genes from our parents. This means that some of the pharmacogenomic variants found on this test may be shared by other relatives. These results could be helpful to share with other relatives, but it is important to remember that there are many factors that can contribute to how an individual responds to medications. Relatives should consider their own  pharmacogenomics testing to better determine their recommendations and they should consult with their health care providers before making any changes.     What can't pharmacogenomic testing tell me?   There are several other factors that can determine how an individual responds to medications. Some of these factors include environmental factors such as lifestyle choices (diet, alcohol and/or tobacco use) or other medications an individual might be taking, and other factors can include a person's age, sex, ethnic background, or disease state. Identifying genetic changes involved in medication processing is important, but cannot tell us everything about a person. Therefore, this can be an excellent tool but is NOT a guaranteed way to find one perfect solution for a patient.     This pharmacogenomic testing is not looking at every known pharmacogenomic polymorphism and therefore the results cannot tell us about every possible gene-drug interaction. It is also not looking at genes outside of the scope of pharmacogenomics, and therefore, the results will not tell us if Mariposa is at risk for other genetic conditions. If Mariposa has questions about a possible underlying genetic condition, she should talk with her primary care provider about seeking an appointment with a general genetics provider.      Testing logistics  [facility] will try to obtain insurance coverage for the pharmacogenomic testing; however, this is not always a covered service. A cost waiver form (non-Medicare non-coverage) is required, but cost to the patient is not expected to exceed $250.     A blood or buccal sample will be collected for DNA analysis. The results of this test take 1-2 weeks. An appointment will be made with the pharmacist to discuss these results in detail and to discuss the medication recommendations based on these results. These test results will be accessible in [employer] and may be viewable prior to the appointment with the  pharmacist, but NO CHANGES SHOULD BE MADE TO MEDICATIONS BEFORE TALKING TO THE PHARMACIST OR HEALTHCARE PROVIDER.     The insurance and billing were explained and she agreed to the billing plan. Mariposa agreed to proceed with pharmacogenomic testing today. Due to the fact that this was a virtual visit, we reviewed the content of the consent forms and Mariposa will review and provide authorization of the forms via [employer].      It was a pleasure meeting with Mariposa today. She vocalized understanding of the information we discussed today and all her questions and concerns were addressed. She has been provided with my contact information should any questions arise regarding our visit or plan moving forward. 28 minutes spent on the date of the encounter in chart review, patient visit, test coordination/review, documentation, and/or discussion with other providers about the issues documented above.      Daniela Dunham MS, [facility]  Genetic Counselor  [facility]   Email: [email or web address]        Please do not hesitate to contact me if you have any questions/concerns.     Sincerely,       Daniela Dunham, GC

## 2025-04-07 NOTE — PROGRESS NOTES
Name:  Mariposa Nunez   :   1995  MRN:   [id number]  Date of service: 2025  Referring Provider: Nadine Villarreal    Genetic Counseling Consultation Note    Presenting Information   Mariposa Nunez is a 30 year old female referred to the [facility] at the request of Nadine Villarreal today. Mariposa was seen for a genetic counseling appointment to discuss the details of pharmacogenomic testing, including her personal medical history, personal medication history including adverse medication responses, her family history of relevant medication responses, and testing logistics. History is obtained from Mariposa and review of the medical record.     ----------------------------------------------------    Plan  At today's visit, we discussed the following plan:     Mariposa will arrange a lab appointment at a [facility] to have a blood sample drawn for the [facility] Pharmacogenomic Test.   Mariposa will be scheduled with one of our [facility] pharmacists 1-2 weeks after her sample is collected to review the results of the Pharmacogenomics Profile. This appointment can be scheduled by calling [phone] after the sample is collected.     ----------------------------------------------------    Personal History  For additional details, review note from Nadine Villarreal MD dated 2025.  To summarize, Mariposa has a history of the following:    Patient Active Problem List   Diagnosis    Anxiety state    History of bee sting allergy    ADHD (attention deficit hyperactivity disorder), inattentive type    Dyspareunia in female    Gastroesophageal reflux disease without esophagitis    Pelvic somatic dysfunction    Vaginismus    Class 1 obesity without serious comorbidity with body mass index (BMI) of 30.0 to 30.9 in adult, unspecified obesity type     Past Medical History:   Diagnosis Date    ADHD (attention deficit hyperactivity disorder)     Anxiety     Dyspareunia, female      She  is currently taking:   Current Outpatient Medications   Medication Sig Dispense Refill    calcium carbonate (TUMS) 500 MG chewable tablet daily for heartburn      celecoxib (CELEBREX) 100 MG capsule Take 1 capsule (100 mg) by mouth 2 times daily as needed for moderate pain. 60 capsule 0    cetirizine (ZYRTEC) 10 MG tablet Take 10 mg by mouth      cyclobenzaprine (FLEXERIL) 5 MG tablet Take 1 tablet (5 mg) by mouth nightly as needed for muscle spasms. 10 tablet 0    famotidine (PEPCID) 40 MG tablet Take 1 tablet by mouth daily      fluticasone (FLONASE) 50 MCG/ACT nasal spray       glutamine 500 MG capsule Take 500 mg by mouth 4 times daily      lactobacillus rhamnosus, GG, (CULTURELL) capsule Take 1 capsule by mouth 2 times daily      metFORMIN (GLUCOPHAGE XR) 500 MG 24 hr tablet Take 4 tablets (2,000 mg) by mouth daily (with dinner). 120 tablet 3    SOOLANTRA 1 % cream APPLY TOPICALLY TO FACE IN THE MORNING FOR ROSACEA       No current facility-administered medications for this visit.       She has also previously tried the following medications:  Prozac - Patient reports she took for just over one year. She felt as though it was helping but was unable to continue after noticing rapid weight gain  Zoloft - Patient reports was also helpful, but again, rapid weight gain was intolerable    Mariposa reports that, along with the weight gain, she's had new symptoms of hormone dysregulation. She also reports a new egg intolerance beginning with a COVID infection (tested positive 02/24/2022). She had previously utilized Motrin as her main pain medication, but around this same time started to experience nausea/GI upset, racing heartbeat, and flushing with use. Benadryl has had a similar effect (while Aleve, Claritin and Zyrtec have not). She also reports nausea with amoxicillin use.     She is pursuing pharmacogenetic testing because her care team is considering trialing non-selective serotonin reuptake inhibitor medications  "to manage Mariposa's anxiety. Ideally, they'd like to minimize concern for side effects before beginning another med.    Family History  A three generation pedigree was obtained and scanned into the electronic medical record. The relevant portions are described below:     Siblings- Mariposa has a sister, age 31, who was born premature and had a hole in her heart. She has many allergies (nuts, gluten) and is a carrier for Bakari-Sachs disease.  Parents- Mariposa's mother, age 57, has hypertension, colitis, a history of skin cancer (removed at Derm's office), is \"sensitive\" to many mediations, and has food allergies. She is also a carrier for Bakari-Sachs disease. Mariposa's father, 63, has high cholesterol.  Maternal Relatives- One aunt in her early 60s with lupus and mast cell disease. She has a history of medication reactions but Mariposa does not know details. One uncle, 65, has hypertension. His son (Mariposa's cousin) has also had medication difficulties but found an SSRI that worked for his anxiety after doing pharmacogenomic testing. Grandmother is living at age 87 with a history of hypertension, high cholesterol, and possibly pre-diabetes. Grandfather is living at age 87 with a history of small skin cancer (removed at Derm's office), hypertension, and a heart attack with follow up stenting.  Paternal Relatives- One aunt, 67, with hypertension. One uncle, 73, with hypertension. Grandfather  around age 91 due to heart failure/heart attack. Grandmother passed in her mid-90s and had a history of a stroke in her 70s. She had hypertension and high cholesterol and had taken a \"vacation\" from her meds.     There is no other reported family history of major allergic reactions, adverse effects of medication, difficulty with general anesthesia, or treatment-resistant conditions. Family history is largely non-contributory.     Given her family history of Bakari-Sachs carriers, we briefly reviewed the genetics of recessive conditions like " [facility]. Mariposa is planning her wedding for October of this year to her fiance and they will pursue genetic carrier screening together through a [facility] genetic health screening group. We reviewed that if they are both carriers, their chance of an affected child would be 25%. We also reviewed that prenatal testing as well as pre-implementation testing via IVF are options to ensure a healthy pregnancy/child, if they are interested. Mariposa is aware she can reach out to me with follow up questions if they arise.      Genetic Counseling Discussion  For review, our bodies are made of cells that contain our chromosomes which are made up of long stretches of DNA containing our genes. Our genes serve as the instructions for our bodies to grow and function. There are several genes in our body that provide instructions for breaking down the medications we take. We have two copies of each gene, one inherited from our mother and one inherited from our father. When one or both copies of those genes are altered, the way that gene's product functions may change. You may have heard this alterations called mutations, variants, or single-nucelotide polymorphisms (SNPs). Gene products like enzymes, transporters, and receptors are extremely important in determining how our body responds to medications and other outside influences. Changes in the instructions for these gene products may alter the way a medication works within your body.     For example, a change in a gene can slow down the process of medication breakdown. That can lead to too much of that medication/drug building up in the body which can cause side effects. On the other hand, a change in a gene can speed up the breakdown of a medication so a therapeutic level is not reached. When this happens, the medication may not work well at the standard doses. Identifying changes in these genes via pharmacogenomic testing can help guide which medications might work best for an  individual, what dose of a medication may be best, or if a certain medication has a high risk for causing serious side effects.    The pharmacogenomic testing offered at [facility] analyzes several different polymorphisms in different genes associated with drug metabolism. These variants have been determined to be medically actionable by practice guidelines including CPIC (Clinical Pharmacogenetics Implementation Consortium), PharmGKB and/or FDA gene-drug interaction guidelines. Therefore, prescribing recommendations can be made by the results of this test, so this testing for Mariposa Nunez is DIAGNOSTIC and is NOT investigational.     The results of this test can provide guidance for several different types of drugs such as antiinflammatories, antithrombotics, lipid-lowering medication, acid-lowering medications, antiemetics, immunosuppressants, antivirals, antifungals, antidepressants, ADHD medications, antimetabolites, and/or hormone antagonists. This means that, while this testing was recommended for a specific reason right now (Mariposa's mental health management), it may provide guidance for future medication use.     As previously mentioned, we inherit our genes from our parents. This means that some of the pharmacogenomic variants found on this test may be shared by other relatives. These results could be helpful to share with other relatives, but it is important to remember that there are many factors that can contribute to how an individual responds to medications. Relatives should consider their own pharmacogenomics testing to better determine their recommendations and they should consult with their health care providers before making any changes.     What can't pharmacogenomic testing tell me?   There are several other factors that can determine how an individual responds to medications. Some of these factors include environmental factors such as lifestyle choices (diet, alcohol and/or tobacco  use) or other medications an individual might be taking, and other factors can include a person's age, sex, ethnic background, or disease state. Identifying genetic changes involved in medication processing is important, but cannot tell us everything about a person. Therefore, this can be an excellent tool but is NOT a guaranteed way to find one perfect solution for a patient.     This pharmacogenomic testing is not looking at every known pharmacogenomic polymorphism and therefore the results cannot tell us about every possible gene-drug interaction. It is also not looking at genes outside of the scope of pharmacogenomics, and therefore, the results will not tell us if Mariposa is at risk for other genetic conditions. If Mariposa has questions about a possible underlying genetic condition, she should talk with her primary care provider about seeking an appointment with a general genetics provider.      Testing logistics  [facility] will try to obtain insurance coverage for the pharmacogenomic testing; however, this is not always a covered service. A cost waiver form (non-Medicare non-coverage) is required, but cost to the patient is not expected to exceed $250.     A blood or buccal sample will be collected for DNA analysis. The results of this test take 1-2 weeks. An appointment will be made with the pharmacist to discuss these results in detail and to discuss the medication recommendations based on these results. These test results will be accessible in I Do Now I Don't and may be viewable prior to the appointment with the pharmacist, but NO CHANGES SHOULD BE MADE TO MEDICATIONS BEFORE TALKING TO THE PHARMACIST OR HEALTHCARE PROVIDER.     The insurance and billing were explained and she agreed to the billing plan. Mariposa agreed to proceed with pharmacogenomic testing today. Due to the fact that this was a virtual visit, we reviewed the content of the consent forms and Mariposa will review and provide authorization of the  forms via [employer].      It was a pleasure meeting with Mariposa today. She vocalized understanding of the information we discussed today and all her questions and concerns were addressed. She has been provided with my contact information should any questions arise regarding our visit or plan moving forward. 28 minutes spent on the date of the encounter in chart review, patient visit, test coordination/review, documentation, and/or discussion with other providers about the issues documented above.      Daniela Dunham MS, [facility]  Genetic Counselor  [facility]   Email: [email or web address]

## 2025-04-14 ENCOUNTER — TRANSFERRED RECORDS (OUTPATIENT)
Dept: HEALTH INFORMATION MANAGEMENT | Facility: CLINIC | Age: 30
End: 2025-04-14
Payer: COMMERCIAL

## 2025-04-15 ENCOUNTER — TELEPHONE (OUTPATIENT)
Dept: ENDOCRINOLOGY | Facility: CLINIC | Age: 30
End: 2025-04-15

## 2025-04-15 NOTE — TELEPHONE ENCOUNTER
Left Voicemail (1st Attempt) and Sent Mychart (1st Attempt) for the patient to call back and schedule the following:    Appointment type: RET [facility] Nutrition  Provider: Arabella New  Return date: Reschedule 4/15/25 appointment as provider unavailable  Specialty phone number: [phone]  Additional appointment(s) needed: n/a  Additonal Notes: n/a

## 2025-05-02 PROCEDURE — G0452 MOLECULAR PATHOLOGY INTERPR: HCPCS | Mod: 26 | Performed by: STUDENT IN AN ORGANIZED HEALTH CARE EDUCATION/TRAINING PROGRAM

## 2025-05-12 ENCOUNTER — RESULTS FOLLOW-UP (OUTPATIENT)
Dept: PEDIATRICS | Facility: CLINIC | Age: 30
End: 2025-05-12

## 2025-05-19 ENCOUNTER — VIRTUAL VISIT (OUTPATIENT)
Dept: PHARMACY | Facility: CLINIC | Age: 30
End: 2025-05-19
Payer: COMMERCIAL

## 2025-05-19 DIAGNOSIS — F41.1 ANXIETY STATE: Primary | ICD-10-CM

## 2025-05-19 PROCEDURE — 99207 PR NO CHARGE LOS: CPT | Mod: 95 | Performed by: PHARMACIST

## 2025-05-19 NOTE — Clinical Note
Hello,   I met with patient to review pharmacogenetic (PGx) results. Please see note for summary of results.  Thank you,   Eloina Harris, PharmD, BCPP Medication Therapy Management Pharmacist [facility]

## 2025-05-19 NOTE — PROGRESS NOTES
Disease State Management Encounter:                          Mariposa Nunez is a 30 year old female seen for an initial visit. She was referred to me from pcp/genetic counselor.     Reason for visit: pharmacogenetic (PGx) results review      Mental Health   Anxiety/OCD  No current medications    Today patient reports:   - past trials of prozac (1.5 years) then sertraline but both caused significant weight gain; took many months to lose weight  - hasn't been on any psychiatry medications in about 1 year; but would like to try something. Pharmacogenetic (PGx) testing to help guide options              Assessment/Plan:    Pharmacogenetic (PGx) Results: See the Genomic Indicators Activity for a complete list of pharmacogenetic results and drug recommendations.     Results relevant for PGx consult reason:    CYP2B6 intermediate metabolizer: decreased CYP2B6 function  FDB4X82 normal metabolizer: normal KHM1E39 function  CYP2C9 normal metabolizer: normal CYP2C9 function  CYP2D6 normal metabolizer: normal CYP2D6 function  CY normal metabolizer: normal CY function  DPYD normal metabolizer: normal DPYD function  [id number] normal function  TPMT normal metabolizer: normal TPMT function    CYP2B6 Intermediate Metabolizer  a. Expected to have somewhat increased levels of medications metabolized by CYP2B6 (e.g., efavirenz and sertraline) and may be at increased risk of side effects.  b. Current medications affected: none  c. Past medications impacted: sertraline    Recommendations: Reviewed PGx report and how to use the information for future decision making, noting that these results are only one piece of the picture and adjustments should also be made based on efficacy and tolerability. Patient has normal metabolic activity at majority of enzymes tested. No implications for antidepressant medications or recommendations for dose adjustments based on this - other than sertraline, which patient has already  tried/failed.    Follow-up: MTM as requested (full MTM not covered by insurance)    I spent 40 minutes with this patient today.     A summary of these recommendations was sent via [employer].    lEoina Harris PharmD, BCPP  Medication Therapy Management Pharmacist  [facility]         Medication Therapy Recommendations  No medication therapy recommendations to display

## 2025-06-17 ENCOUNTER — OFFICE VISIT (OUTPATIENT)
Dept: ENDOCRINOLOGY | Facility: CLINIC | Age: 30
End: 2025-06-17
Payer: COMMERCIAL

## 2025-06-17 VITALS
HEIGHT: 64 IN | HEART RATE: 87 BPM | OXYGEN SATURATION: 98 % | BODY MASS INDEX: 26.5 KG/M2 | SYSTOLIC BLOOD PRESSURE: 126 MMHG | WEIGHT: 155.2 LBS | DIASTOLIC BLOOD PRESSURE: 85 MMHG

## 2025-06-17 DIAGNOSIS — E66.811 CLASS 1 OBESITY WITHOUT SERIOUS COMORBIDITY WITH BODY MASS INDEX (BMI) OF 30.0 TO 30.9 IN ADULT, UNSPECIFIED OBESITY TYPE: Primary | ICD-10-CM

## 2025-06-17 PROCEDURE — G2211 COMPLEX E/M VISIT ADD ON: HCPCS | Performed by: NURSE PRACTITIONER

## 2025-06-17 PROCEDURE — 1126F AMNT PAIN NOTED NONE PRSNT: CPT | Performed by: NURSE PRACTITIONER

## 2025-06-17 PROCEDURE — 3079F DIAST BP 80-89 MM HG: CPT | Performed by: NURSE PRACTITIONER

## 2025-06-17 PROCEDURE — 99213 OFFICE O/P EST LOW 20 MIN: CPT | Performed by: NURSE PRACTITIONER

## 2025-06-17 PROCEDURE — 3074F SYST BP LT 130 MM HG: CPT | Performed by: NURSE PRACTITIONER

## 2025-06-17 RX ORDER — METFORMIN HYDROCHLORIDE 500 MG/1
2000 TABLET, EXTENDED RELEASE ORAL
Qty: 120 TABLET | Refills: 4 | Status: SHIPPED | OUTPATIENT
Start: 2025-06-17

## 2025-06-17 ASSESSMENT — PAIN SCALES - GENERAL: PAINLEVEL_OUTOF10: NO PAIN (0)

## 2025-06-17 NOTE — LETTER
2025       RE: Mariposa Nunez   [address]  [facility] [id number]     Dear Colleague,    Thank you for referring your patient, Mariposa Nunez, to the [facility] [place] [facility]. Please see a copy of my visit note below.      Return Medical Weight Management Note     Mariposa Nunez  MRN:  [id number]  :  1995  GRANT:  2025    Dear Duong Myers MD,    I had the pleasure of seeing your patient Mariposa Nunez. She is a 30 year old female who I am continuing to see for treatment of obesity related to:        2024     6:44 AM   --   I have the following health issues associated with obesity GERD (Reflux)   I have the following symptoms associated with obesity None of the above       Assessment & Plan  Problem List Items Addressed This Visit          Digestive    Class 1 obesity without serious comorbidity with body mass index (BMI) of 30.0 to 30.9 in adult, unspecified obesity type - Primary    Feeling better at the 2,000mg metformin - menstrual cycle more regulated when taking 2,000mg as opposed to 1500mg. No hx of PCOS but did have clotting with periods historically. Appetite fluctuates between repulsed by food and really hungry. Less able to eat in the AM. Feels more often not hungry. Would like to continue metformin. Discussed being more intentional about protein intake in the AM and not skipping eating then.     Continue metformin   Try doing something earlier in the day   See dietitian   Consider tracking for 1 month? - how do you feel day to day?   Follow up with 3-4 months          Relevant Medications    metFORMIN (GLUCOPHAGE XR) 500 MG 24 hr tablet    Other Relevant Orders    CBC with platelets    Comprehensive metabolic panel    Lipid panel reflex to direct LDL Fasting          INTERVAL HISTORY:  New MWM 2024 BMI 30 with heavy periods, reflux, ocd and anxiety. She has  been tracking her intake with [employer] this past 6 months; eating between 1600 and 1700 calories at least 5 days a week- usually getting close to 50-60g protein unless she isn't planning ahead. Already taking metformin.      Anti-obesity medication history    Current:   Metformin 2000mg   -increase from 1500 to 2000mg helped with menstrual cycle regularity and appetite, saw about 4lb loss   -some days still feels repulsed by food     Recent diet changes:   Usually not very hungry before noon   Used to always eat breakfast   More hungry between 12-5pm   Going for protein heavy snacks   Big swings from really hungry to not hungry   Some food aversion - chicken in particular  -likes greek yogurt, tofu, cottage cheese, PB2   Lactose intolerance     Recent exercise/activity changes:   Going to barre   Restarting orange theory this summer     Recent sleep changes: good     Vitamins/Labs: 2/2025     CURRENT WEIGHT:   155 lbs 3.2 oz    Initial Weight (lbs): 172.2 lbs  Last Visits Weight: 72.2 kg (159 lb 3.2 oz)  Cumulative weight loss (lbs): 17  Weight Loss Percentage: 9.87%  Waist Circumference (cm): 78 cm        6/17/2025    12:23 AM   Changes and Difficulties   With regards to my diet, I am still struggling with: Staying consistent while busy   I have made the following changes to my activity/exercise since my last visit: More barre         MEDICATIONS:   Current Outpatient Medications   Medication Sig Dispense Refill     calcium carbonate (TUMS) 500 MG chewable tablet Take 1 chew tab by mouth daily as needed for heartburn.       cetirizine (ZYRTEC) 10 MG tablet Take 10 mg by mouth daily.       famotidine (PEPCID) 40 MG tablet Take 1 tablet by mouth daily       fluticasone (FLONASE) 50 MCG/ACT nasal spray Spray 1 spray into both nostrils daily.       metFORMIN (GLUCOPHAGE XR) 500 MG 24 hr tablet Take 4 tablets (2,000 mg) by mouth daily (with dinner). 120 tablet 4     SOOLANTRA 1 % cream APPLY TOPICALLY TO FACE IN  "THE MORNING FOR ROSACEA             6/17/2025    12:23 AM   Weight Loss Medication History Reviewed With Patient   Which weight loss medications are you currently taking on a regular basis? Metformin   Are you having any side effects from the weight loss medication that we have prescribed you? Yes   If you are having side effects please describe: Manageable but upset stomach           5/26/2021    10:09 AM 11/20/2024     5:56 AM   MADDY Score (Last Two)   MADDY Raw Score 40 40    Activation Score 100 100    MADDY Level 4 4        Patient-reported         PHYSICAL EXAM:  Objective   /85 (BP Location: Left arm, Patient Position: Sitting, Cuff Size: Adult Regular)   Pulse 87   Ht 1.626 m (5' 4\")   Wt 70.4 kg (155 lb 3.2 oz)   SpO2 98%   BMI 26.64 kg/m      /85 (BP Location: Left arm, Patient Position: Sitting, Cuff Size: Adult Regular)   Pulse 87   Ht 1.626 m (5' 4\")   Wt 70.4 kg (155 lb 3.2 oz)   SpO2 98%   BMI 26.64 kg/m    Body mass index is 26.64 kg/m .  GENERAL: alert and no distress  EYES: Eyes grossly normal to inspection.  No discharge or erythema, or obvious scleral/conjunctival abnormalities.  RESP: No audible wheeze, cough, or visible cyanosis.    SKIN: Visible skin clear. No significant rash, abnormal pigmentation or lesions.  NEURO: Cranial nerves grossly intact.  Mentation and speech appropriate for age.  PSYCH: Appropriate affect, tone, and pace of words        Sincerely,    Chiqui Jauregui NP      28 minutes spent by me on the date of the encounter doing chart review, history and exam, documentation and further activities per the note    The longitudinal plan of care for the diagnosis(es)/condition(s) as documented were addressed during this visit. Due to the added complexity in care, I will continue to support Mariposa in the subsequent management and with ongoing continuity of care.    Again, thank you for allowing me to participate in the care of your patient.      Sincerely,    Chiqui Jauregui, " NP

## 2025-06-17 NOTE — NURSING NOTE
"(   Chief Complaint   Patient presents with    RECHECK     Return MWM    )    ( Weight: 70.4 kg (155 lb 3.2 oz) )  ( Height: 162.6 cm (5' 4\") )  ( BMI (Calculated): 26.64 )  (   )  (   )  (   )  (   )  ( Waist Circumference (cm): 78 cm )  (   )    ( BP: 126/85 )  (   )  (   )  (   )  ( Pulse: 87 )  (   )  ( SpO2: 98 % )    (   Patient Active Problem List   Diagnosis    Anxiety state    History of bee sting allergy    ADHD (attention deficit hyperactivity disorder), inattentive type    Dyspareunia in female    Gastroesophageal reflux disease without esophagitis    Pelvic somatic dysfunction    Vaginismus    Class 1 obesity without serious comorbidity with body mass index (BMI) of 30.0 to 30.9 in adult, unspecified obesity type    )  (   Current Outpatient Medications   Medication Sig Dispense Refill    calcium carbonate (TUMS) 500 MG chewable tablet Take 1 chew tab by mouth daily as needed for heartburn.      cetirizine (ZYRTEC) 10 MG tablet Take 10 mg by mouth daily.      famotidine (PEPCID) 40 MG tablet Take 1 tablet by mouth daily      fluticasone (FLONASE) 50 MCG/ACT nasal spray Spray 1 spray into both nostrils daily.      metFORMIN (GLUCOPHAGE XR) 500 MG 24 hr tablet Take 4 tablets (2,000 mg) by mouth daily (with dinner). 120 tablet 3    SOOLANTRA 1 % cream APPLY TOPICALLY TO FACE IN THE MORNING FOR ROSACEA      )  ( Diabetes Eval:    )    ( Pain Eval:  No Pain (0) )    ( Wound Eval:       )    (   History   Smoking Status    Never   Smokeless Tobacco    Never    )    ( Signed By:  Tony Yancey, EMT; June 17, 2025; 7:32 AM )    "

## 2025-06-17 NOTE — PROGRESS NOTES
Return Medical Weight Management Note     Mariposa Nunez  MRN:  [id number]  :  1995  GRANT:  2025    Dear Duong Myers MD,    I had the pleasure of seeing your patient Mariposa Nunez. She is a 30 year old female who I am continuing to see for treatment of obesity related to:        2024     6:44 AM   --   I have the following health issues associated with obesity GERD (Reflux)   I have the following symptoms associated with obesity None of the above       Assessment & Plan   Problem List Items Addressed This Visit          Digestive    Class 1 obesity without serious comorbidity with body mass index (BMI) of 30.0 to 30.9 in adult, unspecified obesity type - Primary    Feeling better at the 2,000mg metformin - menstrual cycle more regulated when taking 2,000mg as opposed to 1500mg. No hx of PCOS but did have clotting with periods historically. Appetite fluctuates between repulsed by food and really hungry. Less able to eat in the AM. Feels more often not hungry. Would like to continue metformin. Discussed being more intentional about protein intake in the AM and not skipping eating then.     Continue metformin   Try doing something earlier in the day   See dietitian   Consider tracking for 1 month? - how do you feel day to day?   Follow up with 3-4 months          Relevant Medications    metFORMIN (GLUCOPHAGE XR) 500 MG 24 hr tablet    Other Relevant Orders    CBC with platelets    Comprehensive metabolic panel    Lipid panel reflex to direct LDL Fasting          INTERVAL HISTORY:  New MWM 2024 BMI 30 with heavy periods, reflux, ocd and anxiety. She has been tracking her intake with [employer] this past 6 months; eating between 1600 and 1700 calories at least 5 days a week- usually getting close to 50-60g protein unless she isn't planning ahead. Already taking metformin.      Anti-obesity medication history    Current:   Metformin 2000mg   -increase from 1500 to 2000mg helped with  menstrual cycle regularity and appetite, saw about 4lb loss   -some days still feels repulsed by food     Recent diet changes:   Usually not very hungry before noon   Used to always eat breakfast   More hungry between 12-5pm   Going for protein heavy snacks   Big swings from really hungry to not hungry   Some food aversion - chicken in particular  -likes greek yogurt, tofu, cottage cheese, PB2   Lactose intolerance     Recent exercise/activity changes:   Going to barre   Restarting [employer] theory this summer     Recent sleep changes: good     Vitamins/Labs: 2/2025     CURRENT WEIGHT:   155 lbs 3.2 oz    Initial Weight (lbs): 172.2 lbs  Last Visits Weight: 72.2 kg (159 lb 3.2 oz)  Cumulative weight loss (lbs): 17  Weight Loss Percentage: 9.87%  Waist Circumference (cm): 78 cm        6/17/2025    12:23 AM   Changes and Difficulties   With regards to my diet, I am still struggling with: Staying consistent while busy   I have made the following changes to my activity/exercise since my last visit: More barre         MEDICATIONS:   Current Outpatient Medications   Medication Sig Dispense Refill    calcium carbonate (TUMS) 500 MG chewable tablet Take 1 chew tab by mouth daily as needed for heartburn.      cetirizine (ZYRTEC) 10 MG tablet Take 10 mg by mouth daily.      famotidine (PEPCID) 40 MG tablet Take 1 tablet by mouth daily      fluticasone (FLONASE) 50 MCG/ACT nasal spray Spray 1 spray into both nostrils daily.      metFORMIN (GLUCOPHAGE XR) 500 MG 24 hr tablet Take 4 tablets (2,000 mg) by mouth daily (with dinner). 120 tablet 4    SOOLANTRA 1 % cream APPLY TOPICALLY TO FACE IN THE MORNING FOR ROSACEA             6/17/2025    12:23 AM   Weight Loss Medication History Reviewed With Patient   Which weight loss medications are you currently taking on a regular basis? Metformin   Are you having any side effects from the weight loss medication that we have prescribed you? Yes   If you are having side effects please  "describe: Manageable but upset stomach           5/26/2021    10:09 AM 11/20/2024     5:56 AM   MADDY Score (Last Two)   MADDY Raw Score 40 40    Activation Score 100 100    MADDY Level 4 4        Patient-reported         PHYSICAL EXAM:  Objective    /85 (BP Location: Left arm, Patient Position: Sitting, Cuff Size: Adult Regular)   Pulse 87   Ht 1.626 m (5' 4\")   Wt 70.4 kg (155 lb 3.2 oz)   SpO2 98%   BMI 26.64 kg/m      /85 (BP Location: Left arm, Patient Position: Sitting, Cuff Size: Adult Regular)   Pulse 87   Ht 1.626 m (5' 4\")   Wt 70.4 kg (155 lb 3.2 oz)   SpO2 98%   BMI 26.64 kg/m    Body mass index is 26.64 kg/m .  GENERAL: alert and no distress  EYES: Eyes grossly normal to inspection.  No discharge or erythema, or obvious scleral/conjunctival abnormalities.  RESP: No audible wheeze, cough, or visible cyanosis.    SKIN: Visible skin clear. No significant rash, abnormal pigmentation or lesions.  NEURO: Cranial nerves grossly intact.  Mentation and speech appropriate for age.  PSYCH: Appropriate affect, tone, and pace of words        Sincerely,    Chiqui Jauregui NP      28 minutes spent by me on the date of the encounter doing chart review, history and exam, documentation and further activities per the note    The longitudinal plan of care for the diagnosis(es)/condition(s) as documented were addressed during this visit. Due to the added complexity in care, I will continue to support Mariposa in the subsequent management and with ongoing continuity of care.  "

## 2025-06-17 NOTE — PATIENT INSTRUCTIONS
Continue metformin   Try doing something earlier in the day   See dietitian   Consider tracking for 1 month? - how do you feel day to day?   Follow up with 3-4 months

## 2025-06-17 NOTE — ASSESSMENT & PLAN NOTE
Feeling better at the 2,000mg metformin - menstrual cycle more regulated when taking 2,000mg as opposed to 1500mg. No hx of PCOS but did have clotting with periods historically. Appetite fluctuates between repulsed by food and really hungry. Less able to eat in the AM. Feels more often not hungry. Would like to continue metformin. Discussed being more intentional about protein intake in the AM and not skipping eating then.     Continue metformin   Try doing something earlier in the day   See dietitian   Consider tracking for 1 month? - how do you feel day to day?   Follow up with 3-4 months

## 2025-07-08 ENCOUNTER — PATIENT OUTREACH (OUTPATIENT)
Dept: CARE COORDINATION | Facility: CLINIC | Age: 30
End: 2025-07-08
Payer: COMMERCIAL

## 2025-08-14 ENCOUNTER — OFFICE VISIT (OUTPATIENT)
Dept: OBGYN | Facility: CLINIC | Age: 30
End: 2025-08-14
Attending: OBSTETRICS & GYNECOLOGY
Payer: COMMERCIAL

## 2025-08-14 VITALS
WEIGHT: 155.6 LBS | SYSTOLIC BLOOD PRESSURE: 124 MMHG | DIASTOLIC BLOOD PRESSURE: 90 MMHG | BODY MASS INDEX: 26.56 KG/M2 | HEART RATE: 88 BPM | HEIGHT: 64 IN

## 2025-08-14 DIAGNOSIS — Z12.4 SCREENING FOR MALIGNANT NEOPLASM OF CERVIX: ICD-10-CM

## 2025-08-14 DIAGNOSIS — Z00.00 PREVENTATIVE HEALTH CARE: Primary | ICD-10-CM

## 2025-08-14 PROCEDURE — 99213 OFFICE O/P EST LOW 20 MIN: CPT | Mod: 25 | Performed by: OBSTETRICS & GYNECOLOGY

## 2025-08-14 PROCEDURE — G0145 SCR C/V CYTO,THINLAYER,RESCR: HCPCS | Performed by: OBSTETRICS & GYNECOLOGY

## 2025-08-14 ASSESSMENT — ANXIETY QUESTIONNAIRES
3. WORRYING TOO MUCH ABOUT DIFFERENT THINGS: SEVERAL DAYS
6. BECOMING EASILY ANNOYED OR IRRITABLE: NOT AT ALL
2. NOT BEING ABLE TO STOP OR CONTROL WORRYING: SEVERAL DAYS
1. FEELING NERVOUS, ANXIOUS, OR ON EDGE: SEVERAL DAYS
5. BEING SO RESTLESS THAT IT IS HARD TO SIT STILL: NOT AT ALL
8. IF YOU CHECKED OFF ANY PROBLEMS, HOW DIFFICULT HAVE THESE MADE IT FOR YOU TO DO YOUR WORK, TAKE CARE OF THINGS AT HOME, OR GET ALONG WITH OTHER PEOPLE?: SOMEWHAT DIFFICULT
IF YOU CHECKED OFF ANY PROBLEMS ON THIS QUESTIONNAIRE, HOW DIFFICULT HAVE THESE PROBLEMS MADE IT FOR YOU TO DO YOUR WORK, TAKE CARE OF THINGS AT HOME, OR GET ALONG WITH OTHER PEOPLE: SOMEWHAT DIFFICULT
GAD7 TOTAL SCORE: 4
7. FEELING AFRAID AS IF SOMETHING AWFUL MIGHT HAPPEN: NOT AT ALL
7. FEELING AFRAID AS IF SOMETHING AWFUL MIGHT HAPPEN: NOT AT ALL
GAD7 TOTAL SCORE: 4
4. TROUBLE RELAXING: SEVERAL DAYS
GAD7 TOTAL SCORE: 4

## 2025-08-18 LAB
BKR LAB AP GYN ADEQUACY: NORMAL
BKR LAB AP GYN INTERPRETATION: NORMAL
BKR LAB AP HPV REFLEX: NORMAL
BKR LAB AP LMP: NORMAL
BKR LAB AP PREVIOUS ABNORMAL: NORMAL
PATH REPORT.COMMENTS IMP SPEC: NORMAL
PATH REPORT.COMMENTS IMP SPEC: NORMAL
PATH REPORT.RELEVANT HX SPEC: NORMAL

## (undated) DEVICE — PREP TECHNI-CARE CHLOROXYLENOL 3% 4OZ BOTTLE C222-4ZWO

## (undated) DEVICE — GLOVE PROTEXIS MICRO 7.5  2D73PM75

## (undated) DEVICE — ENDO TROCAR 12MM VERSASTEP EXP SLEEVE VS101000

## (undated) DEVICE — Device

## (undated) DEVICE — ENDO TROCAR 05MM VERSASTEP VS101005

## (undated) DEVICE — ENDO TROCAR 12MM VERSASTEP VS101012P

## (undated) DEVICE — TUBING HYDRO-SURG PLUS LAP IRRIGATOR SUCTION 0026870

## (undated) DEVICE — SU MONOCRYL 4-0 PS-2 18" UND Y496G

## (undated) DEVICE — NDL INSUFFLATION 14GA STEP S100000

## (undated) DEVICE — ANTIFOG SOLUTION W/FOAM PAD 31142527

## (undated) DEVICE — SOL WATER IRRIG 1000ML BOTTLE 2F7114

## (undated) DEVICE — DRSG PRIMAPORE 02X3" 7133

## (undated) DEVICE — DECANTER TRANSFER DEVICE 2008S

## (undated) DEVICE — SU VICRYL 2-0 UR-6 27" J602H

## (undated) DEVICE — ESU GROUND PAD UNIVERSAL W/O CORD

## (undated) DEVICE — SUCTION MANIFOLD DORNOCH ULTRA CART UL-CL500

## (undated) DEVICE — BLADE KNIFE SURG 11 371111

## (undated) DEVICE — STRAP KNEE/BODY 31143004

## (undated) DEVICE — LINEN TOWEL PACK X30 5481

## (undated) DEVICE — GLOVE PROTEXIS BLUE W/NEU-THERA 8.0  2D73EB80

## (undated) DEVICE — TUBING INSUFFLATION W/FILTER 10FT GS1016

## (undated) DEVICE — STPL ENDO HANDLE GIA ULTRA UNIVERSAL STD EGIAUSTND

## (undated) RX ORDER — KETOROLAC TROMETHAMINE 30 MG/ML
INJECTION, SOLUTION INTRAMUSCULAR; INTRAVENOUS
Status: DISPENSED
Start: 2018-01-02

## (undated) RX ORDER — FENTANYL CITRATE 50 UG/ML
INJECTION, SOLUTION INTRAMUSCULAR; INTRAVENOUS
Status: DISPENSED
Start: 2018-01-02

## (undated) RX ORDER — ONDANSETRON 2 MG/ML
INJECTION INTRAMUSCULAR; INTRAVENOUS
Status: DISPENSED
Start: 2018-01-02

## (undated) RX ORDER — PROPOFOL 10 MG/ML
INJECTION, EMULSION INTRAVENOUS
Status: DISPENSED
Start: 2018-01-02

## (undated) RX ORDER — GLYCOPYRROLATE 0.2 MG/ML
INJECTION, SOLUTION INTRAMUSCULAR; INTRAVENOUS
Status: DISPENSED
Start: 2018-01-02

## (undated) RX ORDER — DEXAMETHASONE SODIUM PHOSPHATE 4 MG/ML
INJECTION, SOLUTION INTRA-ARTICULAR; INTRALESIONAL; INTRAMUSCULAR; INTRAVENOUS; SOFT TISSUE
Status: DISPENSED
Start: 2018-01-02

## (undated) RX ORDER — NEOSTIGMINE METHYLSULFATE 1 MG/ML
VIAL (ML) INJECTION
Status: DISPENSED
Start: 2018-01-02